# Patient Record
Sex: FEMALE | Race: WHITE | NOT HISPANIC OR LATINO | Employment: PART TIME | URBAN - METROPOLITAN AREA
[De-identification: names, ages, dates, MRNs, and addresses within clinical notes are randomized per-mention and may not be internally consistent; named-entity substitution may affect disease eponyms.]

---

## 2017-01-23 ENCOUNTER — ALLSCRIPTS OFFICE VISIT (OUTPATIENT)
Dept: OTHER | Facility: OTHER | Age: 42
End: 2017-01-23

## 2017-03-17 ENCOUNTER — HOSPITAL ENCOUNTER (EMERGENCY)
Facility: HOSPITAL | Age: 42
Discharge: HOME | End: 2017-03-17
Attending: EMERGENCY MEDICINE | Admitting: EMERGENCY MEDICINE
Payer: COMMERCIAL

## 2017-03-17 VITALS
DIASTOLIC BLOOD PRESSURE: 81 MMHG | OXYGEN SATURATION: 99 % | BODY MASS INDEX: 38.41 KG/M2 | SYSTOLIC BLOOD PRESSURE: 137 MMHG | RESPIRATION RATE: 18 BRPM | WEIGHT: 210 LBS | HEART RATE: 97 BPM | TEMPERATURE: 97.4 F

## 2017-03-17 DIAGNOSIS — T78.40XA ALLERGIC REACTION, INITIAL ENCOUNTER: Primary | ICD-10-CM

## 2017-03-17 PROCEDURE — 96375 TX/PRO/DX INJ NEW DRUG ADDON: CPT

## 2017-03-17 PROCEDURE — 99283 EMERGENCY DEPT VISIT LOW MDM: CPT

## 2017-03-17 PROCEDURE — 96374 THER/PROPH/DIAG INJ IV PUSH: CPT

## 2017-03-17 RX ORDER — DIPHENHYDRAMINE HYDROCHLORIDE 50 MG/ML
INJECTION INTRAMUSCULAR; INTRAVENOUS
Status: COMPLETED
Start: 2017-03-17 | End: 2017-03-17

## 2017-03-17 RX ORDER — EPINEPHRINE 0.3 MG/.3ML
0.3 INJECTION SUBCUTANEOUS ONCE
Qty: 0.3 ML | Refills: 1 | Status: SHIPPED | OUTPATIENT
Start: 2017-03-17 | End: 2018-02-22 | Stop reason: HOSPADM

## 2017-03-17 RX ORDER — DIPHENHYDRAMINE HYDROCHLORIDE 50 MG/ML
50 INJECTION INTRAMUSCULAR; INTRAVENOUS ONCE
Status: COMPLETED | OUTPATIENT
Start: 2017-03-17 | End: 2017-03-17

## 2017-03-17 RX ORDER — METHYLPREDNISOLONE SODIUM SUCCINATE 125 MG/2ML
INJECTION, POWDER, LYOPHILIZED, FOR SOLUTION INTRAMUSCULAR; INTRAVENOUS
Status: COMPLETED
Start: 2017-03-17 | End: 2017-03-17

## 2017-03-17 RX ORDER — METHYLPREDNISOLONE SODIUM SUCCINATE 125 MG/2ML
125 INJECTION, POWDER, LYOPHILIZED, FOR SOLUTION INTRAMUSCULAR; INTRAVENOUS ONCE
Status: COMPLETED | OUTPATIENT
Start: 2017-03-17 | End: 2017-03-17

## 2017-03-17 RX ORDER — POTASSIUM CITRATE 10 MEQ/1
20 TABLET, EXTENDED RELEASE ORAL 2 TIMES DAILY
COMMUNITY
End: 2018-02-20 | Stop reason: DRUGHIGH

## 2017-03-17 RX ORDER — PREDNISONE 20 MG/1
60 TABLET ORAL DAILY
Qty: 15 TABLET | Refills: 0 | Status: SHIPPED | OUTPATIENT
Start: 2017-03-17 | End: 2017-03-22

## 2017-03-17 RX ADMIN — DIPHENHYDRAMINE HYDROCHLORIDE 50 MG: 50 INJECTION, SOLUTION INTRAMUSCULAR; INTRAVENOUS at 05:31

## 2017-03-17 RX ADMIN — METHYLPREDNISOLONE SODIUM SUCCINATE 125 MG: 125 INJECTION, POWDER, FOR SOLUTION INTRAMUSCULAR; INTRAVENOUS at 05:31

## 2017-03-17 RX ADMIN — DIPHENHYDRAMINE HYDROCHLORIDE 50 MG: 50 INJECTION INTRAMUSCULAR; INTRAVENOUS at 05:31

## 2017-03-17 RX ADMIN — METHYLPREDNISOLONE SODIUM SUCCINATE 125 MG: 125 INJECTION, POWDER, LYOPHILIZED, FOR SOLUTION INTRAMUSCULAR; INTRAVENOUS at 05:31

## 2017-04-13 ENCOUNTER — TRANSCRIBE ORDERS (OUTPATIENT)
Dept: LAB | Facility: CLINIC | Age: 42
End: 2017-04-13

## 2017-04-13 ENCOUNTER — APPOINTMENT (OUTPATIENT)
Dept: LAB | Facility: CLINIC | Age: 42
End: 2017-04-13
Payer: COMMERCIAL

## 2017-04-13 DIAGNOSIS — L50.9 URTICARIA, UNSPECIFIED: ICD-10-CM

## 2017-04-13 DIAGNOSIS — T78.3XXA ANGIONEUROTIC EDEMA NOT ELSEWHERE CLASSIFIED: Primary | ICD-10-CM

## 2017-04-13 LAB
C4 SERPL-MCNC: 27 MG/DL (ref 10–40)
ERYTHROCYTE [SEDIMENTATION RATE] IN BLOOD: 16 MM/HOUR (ref 0–20)
TSH SERPL DL<=0.05 MIU/L-ACNC: 0.95 UIU/ML (ref 0.36–3.74)

## 2017-04-13 PROCEDURE — 85652 RBC SED RATE AUTOMATED: CPT

## 2017-04-13 PROCEDURE — 86038 ANTINUCLEAR ANTIBODIES: CPT

## 2017-04-13 PROCEDURE — 84443 ASSAY THYROID STIM HORMONE: CPT

## 2017-04-13 PROCEDURE — 86160 COMPLEMENT ANTIGEN: CPT

## 2017-04-13 PROCEDURE — 86618 LYME DISEASE ANTIBODY: CPT

## 2017-04-13 PROCEDURE — 82785 ASSAY OF IGE: CPT

## 2017-04-13 PROCEDURE — 36415 COLL VENOUS BLD VENIPUNCTURE: CPT

## 2017-04-14 LAB
B BURGDOR IGG SER IA-ACNC: 0.14
B BURGDOR IGM SER IA-ACNC: 0.56
RYE IGE QN: NEGATIVE
TOTAL IGE SMQN RAST: 209 KU/L (ref 0–113)

## 2017-04-25 LAB — MISCELLANEOUS LAB TEST RESULT: NORMAL

## 2017-06-21 ENCOUNTER — ALLSCRIPTS OFFICE VISIT (OUTPATIENT)
Dept: OTHER | Facility: OTHER | Age: 42
End: 2017-06-21

## 2017-06-21 DIAGNOSIS — Z12.31 ENCOUNTER FOR SCREENING MAMMOGRAM FOR MALIGNANT NEOPLASM OF BREAST: ICD-10-CM

## 2017-06-21 DIAGNOSIS — R73.09 OTHER ABNORMAL GLUCOSE: ICD-10-CM

## 2017-06-21 DIAGNOSIS — E78.5 HYPERLIPIDEMIA: ICD-10-CM

## 2017-07-06 ENCOUNTER — APPOINTMENT (OUTPATIENT)
Dept: LAB | Facility: CLINIC | Age: 42
End: 2017-07-06
Payer: COMMERCIAL

## 2017-07-06 DIAGNOSIS — R73.09 OTHER ABNORMAL GLUCOSE: ICD-10-CM

## 2017-07-06 DIAGNOSIS — E78.5 HYPERLIPIDEMIA: ICD-10-CM

## 2017-07-06 LAB
ALBUMIN SERPL BCP-MCNC: 2.9 G/DL (ref 3.5–5)
ALP SERPL-CCNC: 69 U/L (ref 46–116)
ALT SERPL W P-5'-P-CCNC: 15 U/L (ref 12–78)
ANION GAP SERPL CALCULATED.3IONS-SCNC: 5 MMOL/L (ref 4–13)
AST SERPL W P-5'-P-CCNC: 8 U/L (ref 5–45)
BILIRUB SERPL-MCNC: 0.42 MG/DL (ref 0.2–1)
BUN SERPL-MCNC: 14 MG/DL (ref 5–25)
CALCIUM SERPL-MCNC: 8.7 MG/DL (ref 8.3–10.1)
CHLORIDE SERPL-SCNC: 104 MMOL/L (ref 100–108)
CHOLEST SERPL-MCNC: 138 MG/DL (ref 50–200)
CO2 SERPL-SCNC: 27 MMOL/L (ref 21–32)
CREAT SERPL-MCNC: 0.94 MG/DL (ref 0.6–1.3)
EST. AVERAGE GLUCOSE BLD GHB EST-MCNC: 126 MG/DL
GFR SERPL CREATININE-BSD FRML MDRD: >60 ML/MIN/1.73SQ M
GLUCOSE P FAST SERPL-MCNC: 111 MG/DL (ref 65–99)
HBA1C MFR BLD: 6 % (ref 4.2–6.3)
HDLC SERPL-MCNC: 26 MG/DL (ref 40–60)
LDLC SERPL CALC-MCNC: 94 MG/DL (ref 0–100)
POTASSIUM SERPL-SCNC: 3.9 MMOL/L (ref 3.5–5.3)
PROT SERPL-MCNC: 7.6 G/DL (ref 6.4–8.2)
SODIUM SERPL-SCNC: 136 MMOL/L (ref 136–145)
TRIGL SERPL-MCNC: 90 MG/DL

## 2017-07-06 PROCEDURE — 80053 COMPREHEN METABOLIC PANEL: CPT

## 2017-07-06 PROCEDURE — 36415 COLL VENOUS BLD VENIPUNCTURE: CPT

## 2017-07-06 PROCEDURE — 83036 HEMOGLOBIN GLYCOSYLATED A1C: CPT

## 2017-07-06 PROCEDURE — 80061 LIPID PANEL: CPT

## 2017-09-13 ENCOUNTER — HOSPITAL ENCOUNTER (OUTPATIENT)
Dept: MAMMOGRAPHY | Facility: CLINIC | Age: 42
Discharge: HOME | End: 2017-09-13
Payer: COMMERCIAL

## 2017-09-13 DIAGNOSIS — Z12.31 ENCOUNTER FOR SCREENING MAMMOGRAM FOR MALIGNANT NEOPLASM OF BREAST: ICD-10-CM

## 2017-09-13 PROCEDURE — G0202 SCR MAMMO BI INCL CAD: HCPCS

## 2017-09-13 PROCEDURE — 77063 BREAST TOMOSYNTHESIS BI: CPT

## 2017-09-20 ENCOUNTER — ALLSCRIPTS OFFICE VISIT (OUTPATIENT)
Dept: OTHER | Facility: OTHER | Age: 42
End: 2017-09-20

## 2017-09-20 DIAGNOSIS — Z12.4 ENCOUNTER FOR SCREENING FOR MALIGNANT NEOPLASM OF CERVIX: ICD-10-CM

## 2017-09-20 PROCEDURE — G0145 SCR C/V CYTO,THINLAYER,RESCR: HCPCS | Performed by: OBSTETRICS & GYNECOLOGY

## 2017-09-20 PROCEDURE — 87624 HPV HI-RISK TYP POOLED RSLT: CPT | Performed by: OBSTETRICS & GYNECOLOGY

## 2017-09-21 ENCOUNTER — LAB REQUISITION (OUTPATIENT)
Dept: LAB | Facility: HOSPITAL | Age: 42
End: 2017-09-21
Payer: COMMERCIAL

## 2017-09-21 DIAGNOSIS — Z12.4 ENCOUNTER FOR SCREENING FOR MALIGNANT NEOPLASM OF CERVIX: ICD-10-CM

## 2017-09-23 DIAGNOSIS — N20.0 CALCULUS OF KIDNEY: ICD-10-CM

## 2017-09-27 LAB — HPV RRNA GENITAL QL NAA+PROBE: NORMAL

## 2017-09-28 ENCOUNTER — APPOINTMENT (OUTPATIENT)
Dept: PHYSICAL THERAPY | Facility: CLINIC | Age: 42
End: 2017-09-28
Payer: COMMERCIAL

## 2017-09-28 PROCEDURE — 97110 THERAPEUTIC EXERCISES: CPT

## 2017-09-28 PROCEDURE — 97162 PT EVAL MOD COMPLEX 30 MIN: CPT

## 2017-09-28 PROCEDURE — G8990 OTHER PT/OT CURRENT STATUS: HCPCS

## 2017-09-28 PROCEDURE — G8991 OTHER PT/OT GOAL STATUS: HCPCS

## 2017-10-02 LAB
LAB AP GYN PRIMARY INTERPRETATION: NORMAL
LAB AP LMP: NORMAL
Lab: NORMAL

## 2017-10-03 ENCOUNTER — APPOINTMENT (OUTPATIENT)
Dept: PHYSICAL THERAPY | Facility: CLINIC | Age: 42
End: 2017-10-03
Payer: COMMERCIAL

## 2017-10-03 PROCEDURE — 97110 THERAPEUTIC EXERCISES: CPT

## 2017-10-03 PROCEDURE — 97140 MANUAL THERAPY 1/> REGIONS: CPT

## 2017-10-04 ENCOUNTER — GENERIC CONVERSION - ENCOUNTER (OUTPATIENT)
Dept: OTHER | Facility: OTHER | Age: 42
End: 2017-10-04

## 2017-10-05 ENCOUNTER — APPOINTMENT (OUTPATIENT)
Dept: PHYSICAL THERAPY | Facility: CLINIC | Age: 42
End: 2017-10-05
Payer: COMMERCIAL

## 2017-10-05 PROCEDURE — 97110 THERAPEUTIC EXERCISES: CPT

## 2017-10-05 PROCEDURE — 97140 MANUAL THERAPY 1/> REGIONS: CPT

## 2017-10-10 ENCOUNTER — TRANSCRIBE ORDERS (OUTPATIENT)
Dept: RADIOLOGY | Facility: HOSPITAL | Age: 42
End: 2017-10-10

## 2017-10-10 ENCOUNTER — HOSPITAL ENCOUNTER (OUTPATIENT)
Dept: RADIOLOGY | Facility: HOSPITAL | Age: 42
Discharge: HOME | End: 2017-10-10
Attending: UROLOGY
Payer: COMMERCIAL

## 2017-10-10 DIAGNOSIS — N20.0 CALCULUS OF KIDNEY: ICD-10-CM

## 2017-10-10 PROCEDURE — 76770 US EXAM ABDO BACK WALL COMP: CPT

## 2017-10-10 PROCEDURE — 74000 HB X-RAY EXAM OF ABDOMEN (SINGLE ANTEROPOSTERIOR VIEW): CPT

## 2017-10-11 ENCOUNTER — APPOINTMENT (OUTPATIENT)
Dept: PHYSICAL THERAPY | Facility: CLINIC | Age: 42
End: 2017-10-11
Payer: COMMERCIAL

## 2017-10-11 PROCEDURE — 97140 MANUAL THERAPY 1/> REGIONS: CPT

## 2017-10-11 PROCEDURE — 97110 THERAPEUTIC EXERCISES: CPT

## 2017-10-13 ENCOUNTER — APPOINTMENT (OUTPATIENT)
Dept: PHYSICAL THERAPY | Facility: CLINIC | Age: 42
End: 2017-10-13
Payer: COMMERCIAL

## 2017-10-13 PROCEDURE — 97110 THERAPEUTIC EXERCISES: CPT

## 2017-10-13 PROCEDURE — 97140 MANUAL THERAPY 1/> REGIONS: CPT

## 2017-10-16 ENCOUNTER — APPOINTMENT (OUTPATIENT)
Dept: PHYSICAL THERAPY | Facility: CLINIC | Age: 42
End: 2017-10-16
Payer: COMMERCIAL

## 2017-10-17 ENCOUNTER — APPOINTMENT (OUTPATIENT)
Dept: PHYSICAL THERAPY | Facility: CLINIC | Age: 42
End: 2017-10-17
Payer: COMMERCIAL

## 2017-10-17 PROCEDURE — 97110 THERAPEUTIC EXERCISES: CPT

## 2017-10-18 ENCOUNTER — APPOINTMENT (OUTPATIENT)
Dept: PHYSICAL THERAPY | Facility: CLINIC | Age: 42
End: 2017-10-18
Payer: COMMERCIAL

## 2017-10-23 ENCOUNTER — APPOINTMENT (OUTPATIENT)
Dept: PHYSICAL THERAPY | Facility: CLINIC | Age: 42
End: 2017-10-23
Payer: COMMERCIAL

## 2017-10-23 PROCEDURE — 97110 THERAPEUTIC EXERCISES: CPT

## 2017-10-23 PROCEDURE — 97140 MANUAL THERAPY 1/> REGIONS: CPT

## 2017-10-25 ENCOUNTER — APPOINTMENT (OUTPATIENT)
Dept: PHYSICAL THERAPY | Facility: CLINIC | Age: 42
End: 2017-10-25
Payer: COMMERCIAL

## 2017-10-25 PROCEDURE — G8992 OTHER PT/OT  D/C STATUS: HCPCS

## 2017-10-25 PROCEDURE — G8991 OTHER PT/OT GOAL STATUS: HCPCS

## 2017-10-25 PROCEDURE — 97110 THERAPEUTIC EXERCISES: CPT

## 2018-01-05 ENCOUNTER — APPOINTMENT (OUTPATIENT)
Dept: LAB | Facility: CLINIC | Age: 43
End: 2018-01-05
Payer: COMMERCIAL

## 2018-01-05 DIAGNOSIS — N20.0 CALCULUS OF KIDNEY: ICD-10-CM

## 2018-01-05 LAB
ANION GAP SERPL CALCULATED.3IONS-SCNC: 5 MMOL/L (ref 4–13)
BACTERIA UR QL AUTO: ABNORMAL /HPF
BILIRUB UR QL STRIP: NEGATIVE
BUN SERPL-MCNC: 15 MG/DL (ref 5–25)
CALCIUM SERPL-MCNC: 9.2 MG/DL (ref 8.3–10.1)
CHLORIDE SERPL-SCNC: 104 MMOL/L (ref 100–108)
CLARITY UR: ABNORMAL
CO2 SERPL-SCNC: 30 MMOL/L (ref 21–32)
COLOR UR: YELLOW
CREAT SERPL-MCNC: 0.89 MG/DL (ref 0.6–1.3)
GFR SERPL CREATININE-BSD FRML MDRD: 81 ML/MIN/1.73SQ M
GLUCOSE SERPL-MCNC: 95 MG/DL (ref 65–140)
GLUCOSE UR STRIP-MCNC: NEGATIVE MG/DL
HGB UR QL STRIP.AUTO: ABNORMAL
KETONES UR STRIP-MCNC: NEGATIVE MG/DL
LEUKOCYTE ESTERASE UR QL STRIP: ABNORMAL
NITRITE UR QL STRIP: NEGATIVE
NON-SQ EPI CELLS URNS QL MICRO: ABNORMAL /HPF
PH UR STRIP.AUTO: 7.5 [PH] (ref 4.5–8)
POTASSIUM SERPL-SCNC: 3.9 MMOL/L (ref 3.5–5.3)
PROT UR STRIP-MCNC: ABNORMAL MG/DL
RBC #/AREA URNS AUTO: ABNORMAL /HPF
SODIUM SERPL-SCNC: 139 MMOL/L (ref 136–145)
SP GR UR STRIP.AUTO: 1.02 (ref 1–1.03)
UROBILINOGEN UR QL STRIP.AUTO: 0.2 E.U./DL
WBC #/AREA URNS AUTO: ABNORMAL /HPF

## 2018-01-05 PROCEDURE — 81001 URINALYSIS AUTO W/SCOPE: CPT

## 2018-01-05 PROCEDURE — 80048 BASIC METABOLIC PNL TOTAL CA: CPT

## 2018-01-05 PROCEDURE — 36415 COLL VENOUS BLD VENIPUNCTURE: CPT

## 2018-01-09 ENCOUNTER — TRANSCRIBE ORDERS (OUTPATIENT)
Dept: LAB | Facility: CLINIC | Age: 43
End: 2018-01-09

## 2018-01-09 ENCOUNTER — APPOINTMENT (OUTPATIENT)
Dept: LAB | Facility: CLINIC | Age: 43
End: 2018-01-09
Payer: COMMERCIAL

## 2018-01-09 DIAGNOSIS — N20.0 BILATERAL KIDNEY STONES: Primary | ICD-10-CM

## 2018-01-09 PROCEDURE — 83935 ASSAY OF URINE OSMOLALITY: CPT

## 2018-01-09 PROCEDURE — 82436 ASSAY OF URINE CHLORIDE: CPT

## 2018-01-09 PROCEDURE — 84560 ASSAY OF URINE/URIC ACID: CPT

## 2018-01-09 PROCEDURE — 83735 ASSAY OF MAGNESIUM: CPT

## 2018-01-09 PROCEDURE — 82507 ASSAY OF CITRATE: CPT

## 2018-01-09 PROCEDURE — 84105 ASSAY OF URINE PHOSPHORUS: CPT

## 2018-01-09 PROCEDURE — 82131 AMINO ACIDS SINGLE QUANT: CPT

## 2018-01-09 PROCEDURE — 84300 ASSAY OF URINE SODIUM: CPT

## 2018-01-09 PROCEDURE — 82570 ASSAY OF URINE CREATININE: CPT

## 2018-01-09 PROCEDURE — 82340 ASSAY OF CALCIUM IN URINE: CPT

## 2018-01-09 PROCEDURE — 83945 ASSAY OF OXALATE: CPT

## 2018-01-09 PROCEDURE — 84133 ASSAY OF URINE POTASSIUM: CPT

## 2018-01-09 PROCEDURE — 84392 ASSAY OF URINE SULFATE: CPT

## 2018-01-09 PROCEDURE — 82140 ASSAY OF AMMONIA: CPT

## 2018-01-09 PROCEDURE — 81003 URINALYSIS AUTO W/O SCOPE: CPT

## 2018-01-09 NOTE — MISCELLANEOUS
Message   Recorded as Task   Date: 06/27/2016 12:55 PM, Created By: Tramaine   Task Name: Call Back   Assigned To: Tramaine   Regarding Patient: Mel Sifuentes, Status: Active   Comment: Tramaine - 27 Jun 2016 12:55 PM     TASK CREATED  call patient when she returns from vacation on July 5   Joanna Arriaga - 13 Jul 2016 3:49 PM     TASK EDITED  s/w patient  advised A1C is 6% advised to watch diet closer and we can recheck that in 6 months  discussed XR results, severe DDD, advised we should get an MRI due to the decrease activity level and increase in pain patient is having  Patient agreed          Signatures   Electronically signed by : Dianna Muse, [facility]; Jul 13 2016  3:50PM EST                       (Author)

## 2018-01-10 ENCOUNTER — GENERIC CONVERSION - ENCOUNTER (OUTPATIENT)
Dept: OTHER | Facility: OTHER | Age: 43
End: 2018-01-10

## 2018-01-11 NOTE — PROGRESS NOTES
Assessment    1  Never a smoker   2  Chronic lumbar radiculopathy (724 4) (M54 16)   3  Encounter for examination for period of delayed growth in childhood without abnormal   findings (V70 8) (Z00 70)   4  Obesity (278 00) (E66 9)   5  Annual visit for general adult medical examination with abnormal findings (V70 0)   (Z00 01)    Plan  Annual visit for general adult medical examination with abnormal findings    · (1) HEMOGLOBIN A1C; Status:Active; Requested LOQ:[id number];    · (1) LIPID PANEL, FASTING; Status:Active; Requested for:21Jun2016;   Chronic lumbar radiculopathy    · Nabumetone 750 MG Oral Tablet; TAKE 1 TABLET EVERY 12 HOURS DAILY   · * XR SPINE LUMBAR MINIMUM 4 VIEWS; Status:Active; Requested ZXO:[id number]; Health Maintenance    · Follow-up visit in 1 year Evaluation and Treatment  Follow-up  Status: Complete  Done:  21Jun2016  Obesity    · Some eating tips that can help you lose weight ; Status:Complete;   Done: 54EAS2106   · We encourage all of our patients to exercise regularly  30 minutes of exercise or physical  activity five or more days a week is recommended for children and adults ;  Status:Complete;   Done: 48FPP4630    Discussion/Summary  health maintenance visit healthy adult female     1  physical - a1c, lipids will be drawn today, encouraged to try to eat healthy and be more active  2  chronic low back pain - get XR  If abnormal XR will refer for MRI, if normal will refer for PT with Maya Christopher  Can use relafen 750 mg 1 pill twice daily for back pain as needed    3  obesity - encouraged to watch diet and try to be more active, need to get #2 addressed    PAP - schedule with Dr Daniella Mcdonald    f/u 1 year or sooner if needed  Chief Complaint  pt here for wellness pe     pap due last done 2013      History of Present Illness  HM, Adult Female:   General Health: The patient's health since the last visit is described as good  She has regular dental visits  She denies vision problems  Vision care includes wearing glasses, having eye examinations 2 times per year and an eye examination more than a year ago  She denies hearing loss  Lifestyle:  She does not have a healthy diet  She does not exercise regularly  She does not use tobacco  She denies alcohol use  She denies drug use  Reproductive health:  LMP - now, last PAP 2013 Dr Marivel Olson  Screening:   HPI: Patient here physical  Has made no change in diet or exercise from last physical due to low back pain  R lower back constant daily  More sedentary because of pain  Can't sit on floor because cannot get up  Constant pain, with change in position stiff and sharp, numbness in toes off and on usually when it gets really bad  Every other week "really bad", lasts one day then improves  Take Aleve or Motrin with some relief, tries to take as little as possible  If she has to work will always have to take 2 aleve twice a day  Denies trauma  Has been happening and getting worse for years now  Review of Systems    Constitutional: No fever, no chills, feels well, no tiredness, no recent weight gain or weight loss  Eyes: No complaints of eye pain, no red eyes, no eyesight problems, no discharge, no dry eyes, no itching of eyes  ENT: no complaints of earache, no loss of hearing, no nose bleeds, no nasal discharge, no sore throat, no hoarseness  Cardiovascular: No complaints of slow heart rate, no fast heart rate, no chest pain, no palpitations, no leg claudication, no lower extremity edema  Respiratory: No complaints of shortness of breath, no wheezing, no cough, no SOB on exertion, no orthopnea, no PND  Gastrointestinal: No complaints of abdominal pain, no constipation, no nausea or vomiting, no diarrhea, no bloody stools  Genitourinary: No complaints of dysuria, no incontinence, no pelvic pain, no dysmenorrhea, no vaginal discharge or bleeding     Musculoskeletal: No complaints of arthralgias, no myalgias, no joint swelling or stiffness, no limb pain or swelling  Integumentary: No complaints of skin rash or lesions, no itching, no skin wounds, no breast pain or lump  Neurological: No complaints of headache, no confusion, no convulsions, no numbness, no dizziness or fainting, no tingling, no limb weakness, no difficulty walking  Psychiatric: Not suicidal, no sleep disturbance, no anxiety or depression, no change in personality, no emotional problems  Endocrine: No complaints of proptosis, no hot flashes, no muscle weakness, no deepening of the voice, no feelings of weakness  Hematologic/Lymphatic: No complaints of swollen glands, no swollen glands in the neck, does not bleed easily, does not bruise easily  Active Problems    1  Allergic rhinitis (477 9) (J30 9)   2  Elderly multigravida with antepartum condition or complication (005 24) (H36 400)   3  Gestational Diabetes Mellitus - Antepartum Condition Or Prior Complicated Delivery   (980 22)   4   Obesity (278 00) (E66 9)    Past Medical History    · History of Obesity complicating pregnancy, childbirth, or puerperium, antepartum  (649 13,278 00) (O99 210,E66 9)   · History of Uterine scar from previous  delivery, antepartum condition or  complication (630 21) (H16 02)    Surgical History    · History of  Section   · History of Oral Surgery Tooth Extraction   · History of Tonsillectomy   · History of Tubal Ligation    Family History  Mother    · Family history of diabetes mellitus (V18 0) (Z83 3)  Father    · Family history of Coronary artery disease   · Family history of hyperlipidemia (V18 19) (Z83 49)   · Family history of hypertension (V17 49) (Z82 49)   · Family history of malignant neoplasm of urinary bladder (V16 52) (Z80 52)    Social History    · Denied: History of Alcohol use   · Always uses seat belt   · Caffeine use (V49 89) (F15 90)   · 1 cup tea daily   · Denied: History of Drug use   · Has smoke detectors   · Never a smoker    Current Meds 1  Fluticasone Propionate 50 MCG/ACT Nasal Suspension; INSTILL 1 SQUIRT Daily; Therapy: (Recorded:10Jun2015) to Recorded   2  Ibuprofen 200 MG Oral Tablet; TAKE 1 TABLET EVERY 4 TO 6 HOURS AS NEEDED; Therapy: ([place] [facility]) to Recorded   3  PriLOSEC 20 MG Oral Capsule Delayed Release; TAKE 1 CAPSULE Daily PRN; Therapy: ([place] [facility]) to Recorded    Allergies    1  No Known Drug Allergies    2  Seasonal    Vitals   Recorded: 21Jun2016 10:49AM Recorded: 21Jun2016 09:46AM   Heart Rate  68   Respiration  16   Systolic 988 132, LUE, Sitting   Diastolic 84 80, LUE, Sitting   Height  5 ft 2 in   Weight  207 lb 13 oz   BMI Calculated  38 01   BSA Calculated  1 94     Physical Exam    Constitutional   General appearance: No acute distress, well appearing and well nourished  Eyes   Conjunctiva and lids: No swelling, erythema or discharge  Pupils and irises: Equal, round, reactive to light  Ears, Nose, Mouth, and Throat   External inspection of ears and nose: Normal     Otoscopic examination: Tympanic membranes translucent with normal light reflex  Canals patent without erythema  Hearing: Normal     Nasal mucosa, septum, and turbinates: Normal without edema or erythema  Lips, teeth, and gums: Normal, good dentition  Oropharynx: Normal with no erythema, edema, exudate or lesions  Neck   Neck: Supple, symmetric, trachea midline, no masses  Thyroid: Normal, no thyromegaly  Pulmonary   Respiratory effort: No increased work of breathing or signs of respiratory distress  Palpation of chest: Normal     Auscultation of lungs: Clear to auscultation  Cardiovascular   Palpation of heart: Normal PMI, no thrills  Auscultation of heart: Normal rate and rhythm, normal S1 and S2, no murmurs  Pedal pulses: 2+ bilaterally  Examination of extremities for edema and/or varicosities: Normal     Abdomen   Abdomen: Non-tender, no masses  Liver and spleen: No hepatomegaly or splenomegaly  Lymphatic   Palpation of lymph nodes in neck: No lymphadenopathy  Musculoskeletal   Gait and station: Normal     Digits and nails: Normal without clubbing or cyanosis  Joints, bones, and muscles: Normal     Range of motion: Normal     Stability: Normal     Muscle strength/tone: Normal     Skin   Skin and subcutaneous tissue: Normal without rashes or lesions  Palpation of skin and subcutaneous tissue: Normal turgor  Neurologic   Cranial nerves: Cranial nerves II-XII intact  Reflexes: 2+ and symmetric      Psychiatric   Judgment and insight: Normal     Mood and affect: Normal        Signatures   Electronically signed by : Néstor Vera, [facility]; Jun 21 2016 10:52AM EST                       (Author)    Electronically signed by : BISMARK Man ; Jun 21 2016 11:28AM EST                       (Author)

## 2018-01-13 VITALS
BODY MASS INDEX: 38.64 KG/M2 | SYSTOLIC BLOOD PRESSURE: 120 MMHG | DIASTOLIC BLOOD PRESSURE: 70 MMHG | WEIGHT: 210 LBS | HEIGHT: 62 IN

## 2018-01-13 VITALS
BODY MASS INDEX: 38.83 KG/M2 | HEIGHT: 62 IN | HEART RATE: 70 BPM | DIASTOLIC BLOOD PRESSURE: 72 MMHG | SYSTOLIC BLOOD PRESSURE: 120 MMHG | WEIGHT: 211 LBS

## 2018-01-15 LAB
AMMONIA 24H UR-MRATE: 16 MEQ/24 HR
AMMONIA UR-SCNC: ABNORMAL UG/DL
CA H2 PHOS DIHYD CRY URNS QL MICRO: 1.59 RATIO (ref 0–3)
CALCIUM 24H UR-MCNC: 8.9 MG/DL
CALCIUM 24H UR-MRATE: 138 MG/24 HR (ref 100–300)
CHLORIDE 24H UR-SCNC: 61 MMOL/L
CHLORIDE 24H UR-SRATE: 95 MMOL/24 HR (ref 110–250)
CITRATE 24H UR-MCNC: 297 MG/L
CITRATE 24H UR-MRATE: 460 MG/24 HR (ref 320–1240)
COM CRY STONE QL IR: 2.14 RATIO (ref 0–6)
CREAT 24H UR-MCNC: 44.7 MG/DL
CREAT 24H UR-MRATE: 692.9 MG/24 HR (ref 800–1800)
CYSTINE 24H UR-MCNC: 3.33 MG/L
CYSTINE 24H UR-MRATE: 5.16 MG/24 HR (ref 10–100)
MAGNESIUM 24H UR-MRATE: 64 MG/24 HR (ref 12–293)
MAGNESIUM UR-MCNC: 4.1 MG/DL
NA URATE CRY STONE QL IR: 4.35 RATIO (ref 0–4)
OSMOLALITY UR: 350 MOSMOL/KG (ref 300–900)
OXALATE 24H UR-MRATE: 12 MG/24 HR (ref 4–31)
OXALATE UR-MCNC: 8 MG/L
PH 24H UR: 6.9 [PH]
PHOSPHATE 24H UR-MCNC: 24.2 MG/DL
PHOSPHATE 24H UR-MRATE: 375.1 MG/24 HR (ref 400–1300)
PLEASE NOTE (STONE RISK): ABNORMAL
POTASSIUM 24H UR-SCNC: 37.5 MMOL/24 HR (ref 25–125)
POTASSIUM UR-SCNC: 24.2 MMOL/L
PRESERVED URINE: 1550 ML/24 HR (ref 600–1600)
SODIUM 24H UR-SCNC: 82 MMOL/L
SODIUM 24H UR-SRATE: 127 MMOL/24 HR (ref 39–258)
SPECIMEN VOL 24H UR: 1550 ML/24 HR (ref 600–1600)
SULFATE 24H UR-MCNC: 19 MEQ/24 HR (ref 0–30)
SULFATE UR-MCNC: 12 MEQ/L
TRI-PHOS CRY STONE MICRO: 0.08 RATIO (ref 0–1)
URATE 24H UR-MCNC: 43.8 MG/DL
URATE 24H UR-MRATE: 679 MG/24 HR (ref 250–750)
URATE DIHYD CRY STONE QL IR: 0.27 RATIO (ref 0–1.2)

## 2018-01-15 NOTE — PROGRESS NOTES
Assessment    1  Never a smoker   2  Encounter for preventive health examination (V70 0) (Z00 00)   3  Severe obesity (BMI 35 0-39 9) (278 01) (E66 01)    Plan  Dyslipidemia    · (1) COMPREHENSIVE METABOLIC PANEL; Status:Active; Requested PRD:[id number];    · (1) LIPID PANEL FASTING W DIRECT LDL REFLEX; Status:Active; Requested  TYH:[id number];   Elevated glucose level    · (1) HEMOGLOBIN A1C; Status:Active; Requested CCZ:[id number];   Encounter for screening mammogram for malignant neoplasm of breast    · * MAMMO SCREENING BILATERAL W CAD; Status:Active; Requested SJO:[id number];   Sacroiliitis    · Omeprazole 20 MG Oral Capsule Delayed Release  Severe obesity (BMI 35 0-39  9)    · *1 - [facility]  *  Status: Active  Requested  for: 21Jun2017  Care Summary provided  : Yes   · A diet low in sugar may help your condition ; Status:Complete;   Done: 66ZSK2857   · Begin or continue regular aerobic exercise  Gradually work up to at least 5 sessions of 30  minutes of exercise a week ; Status:Complete;   Done: 88TYE2011   · Diets that are low in carbohydrates and high in protein are very popular for weight loss ;  Status:Complete;   Done: 26TSR5735   · Eat a low fat and low cholesterol diet ; Status:Complete;   Done: 96BTH8857   · We recommend you modify your diet to achieve and maintain a healthy weight  Being  overweight may increase your risk for developing health problems such as diabetes,  heart disease, and cancer  Avoid high fat foods and eat a balanced diet rich  in fruits and vegetables  The combination of a reduced-calorie diet and increased  physical activity is recommended    Please let us know if you would like to  learn more about your nutrition and calorie needs, and additional options including  weight loss programs that can help you achieve your goals ; Status:Complete;   Done:  36CDX6487    Discussion/Summary  health maintenance visit Currently, she eats a poor diet and has an inadequate exercise regimen  the risks and benefits of cervical cancer screening were discussed cervical cancer screening is managed by Gyn Dr Marivel Olson Breast cancer screening: the risks and benefits of breast cancer screening were discussed and mammogram has been ordered  Colorectal cancer screening: colorectal cancer screening is not indicated  The immunizations are up to date  Advice and education were given regarding nutrition, aerobic exercise, sunscreen use and self skin examination  Patient discussion: discussed with the patient  Possible side effects of new medications were reviewed with the patient/guardian today  The treatment plan was reviewed with the patient/guardian  The patient/guardian understands and agrees with the treatment plan      Chief Complaint  Pt presents in the office today for a PE;    Mammo due and order was printed  Pap due and will schedule      History of Present Illness  HM, Adult Female: The patient is being seen for a health maintenance evaluation  The last health maintenance visit was 1 year(s) ago  General Health: She has regular dental visits  Immunizations status: up to date  Lifestyle:  She does not have a healthy diet  She does not exercise regularly  She does not use tobacco  She denies alcohol use  She denies drug use  Reproductive health: the patient is premenopausal   she reports normal menses  Screening:      Review of Systems    Constitutional: no fever, not feeling poorly, no recent weight gain, no chills, not feeling tired and no recent weight loss  Eyes: no eye pain, eyes not red, no purulent discharge from the eyes and no itching of the eyes  ENT: no complaints of earache, no loss of hearing, no nose bleeds, no nasal discharge, no sore throat, no hoarseness  Cardiovascular: No complaints of slow heart rate, no fast heart rate, no chest pain, no palpitations, no leg claudication, no lower extremity edema     Respiratory: No complaints of shortness of breath, no wheezing, no cough, no SOB on exertion, no orthopnea, no PND  Gastrointestinal: No complaints of abdominal pain, no constipation, no nausea or vomiting, no diarrhea, no bloody stools  Genitourinary: no dysuria, no pelvic pain, no vaginal discharge and no unexplained vaginal bleeding  Musculoskeletal: No complaints of arthralgias, no myalgias, no joint swelling or stiffness, no limb pain or swelling  Integumentary: no rashes, no breast pain, no skin lesions and no breast lump  Neurological: no headache, no numbness, no tingling, no dizziness, no limb weakness, no fainting and no difficulty walking  Psychiatric: no anxiety, no sleep disturbances and no depression  Hematologic/Lymphatic: no swollen glands  Active Problems    1  Allergic rhinitis (477 9) (J30 9)   2  Backache (724 5) (M54 9)   3  Chronic lumbar radiculopathy (724 4) (M54 16)   4  Elderly multigravida with antepartum condition or complication (624 54) (I42 878)   5  Encounter for examination for period of delayed growth in childhood without abnormal   findings (V70 8) (Z00 70)   6  GERD (gastroesophageal reflux disease) (530 81) (K21 9)   7  Gestational Diabetes Mellitus - Antepartum Condition Or Prior Complicated Delivery   (382 78)   8  Hydronephrosis (591) (N13 30)   9  Lumbar degenerative disc disease (722 52) (M51 36)   10  Nephrolithiasis (592 0) (N20 0)   11  Sacroiliitis (720 2) (M46 1)   12   Spondylolisthesis of lumbar region (738 4) (M43 16)    Past Medical History    · History of seasonal allergies (V15 09) (Z88 9)   · History of Obesity complicating pregnancy, childbirth, or puerperium, antepartum  (649 13,278 00) (O99 210,E66 9)   · History of Uterine scar from previous  delivery, antepartum condition or  complication (129 62) (C64 87)   · History of Wears eyeglasses (V49 89) (Z97 3)    Surgical History    · History of  Section   · History of Dilation And Curettage   · History of Oral Surgery Tooth Extraction   · History of Tonsillectomy   · History of Tubal Ligation    Family History  Mother    · Family history of diabetes mellitus (V18 0) (Z83 3)  Father    · Family history of Coronary artery disease   · Family history of hyperlipidemia (V18 19) (Z83 49)   · Family history of hypertension (V17 49) (Z82 49)   · Family history of kidney stones (V18 69) (Z84 1)   · Family history of malignant neoplasm (V16 9) (Z80 9)   · Family history of malignant neoplasm of urinary bladder (V16 52) (Z80 52)   · Family history of myocardial infarction (V17 3) (Z82 49)  Sister    · Family history of kidney stones (V18 69) (Z84 1)   · Family history of multiple sclerosis (V17 2) (Z82 0)  Maternal [de-identified] Sister    · Family history of multiple sclerosis (V17 2) (Z82 0)  Maternal Grandmother    · FH: COPD (chronic obstructive pulmonary disease) (V17 6) (Z82 5)  Paternal Grandmother    · Family history of liver cancer (V16 0) (Z80 0)   · Family history of pancreatic cancer (V16 0) (Z80 0)  Paternal Grandfather    · Family history of malignant neoplasm of stomach (V16 0) (Z80 0)    Social History    · Denied: History of Alcohol use   · Always uses seat belt   · Caffeine use (V49 89) (F15 90)   · 1 cup tea daily   · Completed college   · Currently sexually active   · Denied: History of Drug use   · Functioning activity level   · participates in sedentary/light activities both inside and outside of the home   · Has smoke detectors   · Living situation   · lives with  and children   ·    · Never a smoker   · 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   · Two children    Current Meds   1  Aleve TABS; 1-2 daily prn; Therapy: (Recorded:16Gcy7549) to Recorded   2  Fluticasone Propionate 50 MCG/ACT Nasal Suspension; INSTILL 1 SQUIRT Daily; Therapy: (Recorded:10Jun2015) to Recorded   3  Omeprazole 20 MG Oral Capsule Delayed Release; TAKE 1 CAPSULE DAILY EVERY   MORNING BEFORE BREAKFAST;    Therapy: 64SRX7071 to (Evaluate:56Wmb4246)  Requested for: 45Ctj3407; Last   Rx:35Zlu8263 Ordered   4  Potassium Citrate ER 10 MEQ (1080 MG) Oral Tablet Extended Release; TAKE 2   TABLETS TWICE DAILY; Therapy: 13IVH6699 to (Evaluate:30Kkd2234)  Requested for: 29YWR0800; Last   Rx:87Gyn8286 Ordered   5  PriLOSEC 20 MG CPDR; TAKE 1 CAPSULE Daily PRN; Therapy: ([facility]) to Recorded    Allergies    1  No Known Drug Allergies    2  Seasonal    Vitals   Recorded: 21Jun2017 02:17PM   Heart Rate 76   Respiration 16   Systolic 757   Diastolic 84   Height 5 ft 2 in   Weight 212 lb 6 4 oz   BMI Calculated 38 85   BSA Calculated 1 96     Physical Exam    Constitutional   General appearance: No acute distress, well appearing and well nourished  Eyes   Conjunctiva and lids: No swelling, erythema or discharge  PERRL, EOMI  Ears, Nose, Mouth, and Throat   External inspection of ears and nose: Normal     Otoscopic examination: Tympanic membranes translucent with normal light reflex  Canals patent without erythema  Lips, teeth, and gums: Normal, good dentition  Oropharynx: Normal with no erythema, edema, exudate or lesions  Neck   Neck: Supple, symmetric, trachea midline, no masses  Thyroid: Normal, no thyromegaly  Pulmonary   Respiratory effort: No increased work of breathing or signs of respiratory distress  Auscultation of lungs: Clear to auscultation  Cardiovascular   Auscultation of heart: Normal rate and rhythm, normal S1 and S2, no murmurs  Examination of extremities for edema and/or varicosities: Normal     Abdomen   Abdomen: Non-tender, no masses  Liver and spleen: No hepatomegaly or splenomegaly  Lymphatic   Palpation of lymph nodes in neck: No lymphadenopathy  Neurologic   Cranial nerves: Cranial nerves II-XII intact  Reflexes: 2+ and symmetric  Psychiatric   Orientation to person, place, and time: Normal     Recent and remote memory: Intact      Mood and affect: Normal        Results/Data  (1) TSH 13Apr2017 10:57AM EPIC, Provider   Test ordered by: Ulysses Picking     Test Name Result Flag Reference   TSH 0 948 uIU/mL  0 358-3 740   This bloodwork is non-fasting  Please drink two glasses of water morning of  bloodwork  This bloodwork is non-fasting  Please drink two glasses of water morning ofbloodwork  Patients undergoing fluorescein dye angiography may retain small amounts of fluorescein in the body for 48-72 hours post procedure  Samples containing fluorescein can produce falsely depressed TSH values  If the patient had this procedure,a specimen should be resubmitted post fluorescein clearance  The recommended reference ranges for TSH during pregnancy are as follows:  First trimester 0 1 to 2 5 uIU/mL  Second trimester  0 2 to 3 0 uIU/mL  Third trimester 0 3 to 3 0 uIU/m     (1) BASIC METABOLIC PROFILE 84WIQ6990 08:41AM Shan Plum   TW Order Number: [id number]  TW Order Number: [id number]     Test Name Result Flag Reference   GLUCOSE,RANDM 100 mg/dL     If the patient is fasting, the ADA then defines impaired fasting glucose as > 100 mg/dL and diabetes as > or equal to 123 mg/dL  SODIUM 139 mmol/L  136-145   POTASSIUM 3 8 mmol/L  3 5-5 3   CHLORIDE 104 mmol/L  100-108   CARBON DIOXIDE 27 mmol/L  21-32   ANION GAP (CALC) 8 mmol/L  4-13   BLOOD UREA NITROGEN 15 mg/dL  5-25   CREATININE 0 86 mg/dL  0 60-1 30   Standardized to IDMS reference method   CALCIUM 8 5 mg/dL  8 3-10 1   eGFR Non-African American      >60 0 ml/min/1 73sq [facility] [employer] Disease Education Program recommendations are as follows:  GFR calculation is accurate only with a steady state creatinine  Chronic Kidney disease less than 60 ml/min/1 73 sq  meters  Kidney failure less than 15 ml/min/1 73 sq  meters  Future Appointments    Date/Time Provider Specialty Site   10/17/2017 09:30 AM BISMARK Spence   Urology [facility] [address]     Signatures   Electronically signed by : Christopher Mclaughlin Sibyl Kanner, M D ; Jun 21 2017  6:27PM EST                       (Author)

## 2018-01-16 NOTE — RESULT NOTES
Verified Results  (1) STONE RISK PROFILE, 24 HOUR URINE 93Yqe4278 11:19AM Shelia Section    Order Number: [id number]  TW Order Number: [id number]     Test Name Result Flag Reference   AMMONIA, URINE 77213 ug/dL  Not Estab  Rosalia Groom  URINE 4 50 mg/L  Not Estab  MAGNESIUM 24 HOUR URINE 68 mg/24 hr  12 - 293   CITRATE URINE 457 mg/L  Not Estab  CALCIUM,UR 11 7 mg/dL  Not Estab  CHLORIDE,UR 60 mmol/L  Not Estab  CREATININE, UR 79 6 mg/dL  Not Estab  AMMONIA, 24 UR 32 mEq/24 hr  Not Estab  CALCIUM, 24UR 175 5 mg/24 hr  100 0 - 300 0   CHLORIDE, 24UR 90 mmol/24 hr L 110 - 250   CITRATE 24 HOUR URINE 686 mg/24 hr  320 - 1240   MAGNESIUM, UR 4 5 mg/dL  Not Estab  OSMOLALITY,  mOsmol/kg  300 - 900   OXALATE URINE (MG/SPEC) 16 mg/L  Not Estab  OXALATE, 24UR 24 mg/24 hr  4 - 31   PH, 24 HR URINE 6 1     PHOSPHORUS, UR 77 8 mg/dL  Not Estab  PHOSPHORUS, 24HR UR 1167 0 mg/24 hr  400 0 - 1300 0   POTASSIUM, UR 39 1 mmol/L  Not Estab  POTASSIUM, 24HR UR 58 6 mmol/24 hr  25 0 - 125 0   SODIUM, UR 84 mmol/L  Not Estab  SODIUM, 24HR  mmol/24 hr  40 - 220   SULFATE, UR, MEQ/L 22 mEq/L  Not Estab  SULFATE 24 HOUR URINE 33 mEq/24 hr H 0 - 30   TOTAL VOLUME (STONE RISK) 1500 mL/24 hr  600 - 1600   Total Volume: 1500 mL   URIC ACID, UR 47 2 mg/dL  Not Estab  URIC ACID, 24HR  mg/24 hr  250 - 750   BRUSHITE 1 76 ratio  0 00 - 3 00   CALCIUM OXALATE 4 26 ratio  0 00 - 6 00   MONOSODIUM URATE 4 16 ratio H 0 00 - 4 00   STRUVITE 0 02 ratio  0 00 - 1 00   URIC ACID (RATIO) 1 59 ratio H 0 00 - 1 20   PLEASE NOTE (STONE RISK) Comment     Graphic analysis of results will follow via computer, mail, or   delivery      Performed at:  [address]  [id number]  : Tammie Soto MD, Phone:  [phone]  Performed at:  [address]  [id number]  : Ladi Villar MD, Phone:  [phone]   CREATININE,UR 24HR 1194 0 mg/24 hr  800 0 - 1800 0   PRESERVED URINE 1500 mL/24 hr  600 - 1600   CYSTINE, QUANT, UR, 24HR 6 75 mg/24 hr L 10 00 - 100 00

## 2018-01-17 ENCOUNTER — GENERIC CONVERSION - ENCOUNTER (OUTPATIENT)
Dept: OTHER | Facility: OTHER | Age: 43
End: 2018-01-17

## 2018-01-18 ENCOUNTER — ALLSCRIPTS OFFICE VISIT (OUTPATIENT)
Dept: OTHER | Facility: OTHER | Age: 43
End: 2018-01-18

## 2018-01-18 DIAGNOSIS — N20.0 CALCULUS OF KIDNEY: ICD-10-CM

## 2018-01-19 NOTE — PROGRESS NOTES
Assessment   1  Calculus of kidney (592 0) (N20 0)   2  Nephrolithiasis (592 0) (N20 0)   3  Backache (724 5) (M54 9)    Plan   Calculus of kidney, PMH: History of hydronephrosis    · Potassium Citrate ER 10 MEQ (1080 MG) Oral Tablet Extended Release   Rx By: Eve Cardona; Dispense: 90 Days ; #:360 Tablet Extended Release; Refill: 3;For: Calculus of kidney, PMH: History of hydronephrosis; YING = N; Sent To: [facility]  Nephrolithiasis    · Potassium Citrate ER 15 MEQ (1620 MG) Oral Tablet Extended Release; take 1    tablet twice a day   Rx By: Eve Cardona; Dispense: 90 Days ; #:90 Tablet Extended Release; Refill: 3;For: Nephrolithiasis; YING = N; Verified Transmission to [address]; Last Updated By: SystemPyreg; 1/18/2018 10:49:40 AM   · (1) CBC/ PLT (NO DIFF); Status:Active; Requested for:01Feb2019;    Perform:[facility]; Due:01Feb2020;Ordered;For:Nephrolithiasis; Ordered By:MelendezLucien marin;   · (1) COMPREHENSIVE METABOLIC PANEL; Status:Active; Requested for:01Feb2019;    Perform:[facility]; Due:01Feb2020;Ordered;For:Nephrolithiasis; Ordered By:MelendezLucien marin;   · (1) MAGNESIUM; Status:Active; Requested for:01Feb2019;    Perform:[facility]; Due:30Omz6105;Ordered;For:Nephrolithiasis; Ordered By:MelendezLucien marin;   · (1) PHOSPHORUS; Status:Active; Requested for:01Feb2019;    Perform:[facility]; Due:38Cqz6136;Ordered;For:Nephrolithiasis; Ordered By:MelendezLucien marin;   · (1) STONE RISK PROFILE, 24 HOUR URINE; Status:Active; Requested for:01Feb2019;    Perform:[facility]; Due:69Fpz5634;Ordered;For:Nephrolithiasis; Ordered By:MelendezLucien marin;   · (1) URIC ACID; Status:Active; Requested for:01Feb2019;    Perform:[facility]; Due:01Feb2020;Ordered;For:Nephrolithiasis; Ordered By:Lucien Melendez;   · (1) URINALYSIS WITH MICROSCOPIC; Status:Active;  Requested for:01Feb2019;    Perform:[facility]; Due:72Lzf3519;Ordered;For:Nephrolithiasis; Ordered By:Lucien Melendez;   · US RETROPERITONEAL COMPLETE; Status:Hold For - Scheduling; Requested    TOQ:70RMZ3988; Perform:[facility]; NLL:99PTH0791;VWXVWTE;DQX:JTSTTQVCCZVJAZB; Ordered By:Lucien Melendez;   · Do not take anti-inflammatory medicines other than aspirin ; Status:Complete;   Done:    65PEB9340   Ordered;For:Nephrolithiasis; Ordered By:Lucien Melendez;   · Drink plenty of fluids ; Status:Complete;   Done: 72BKW0096   Ordered;For:Nephrolithiasis; Ordered By:Lucien Melendez;   · Monitor your urine output ; Status:Complete;   Done: 58EUA1759   Ordered;For:Nephrolithiasis; Ordered By:Lucien Melendez;   · Restrict your sodium (salt) intake to 2 grams per day ; Status:Complete;   Done:    68UNP5846   Ordered;For:Nephrolithiasis; Ordered By:Lucien Melendez;   · We recommend a low-protein diet  Eat no more than 2 servings of protein a day ;    Status:Complete;   Done: 59TMZ5958   Ordered;For:Nephrolithiasis; Ordered By:Lucien Melendez;   · Follow-up visit in 1 year Evaluation and Treatment  Follow-up  Status: Hold For -    Scheduling  Requested for: 10NJH6956   Ordered; For: Nephrolithiasis; Ordered By: Asia Carlos Performed:  Due: 59SDS1100    Discussion/Summary      She is here for follow-up of nephrolithiasis   continues to have a mild back pain her ultrasound from October shows that her nephrolithiasis stone burden is still pretty significant her hydronephrosis has actually improving   change her potassium citrate to long-acting and do 15 milliequivalents twice a day, urgent compliance regarding this  Discussed a low sodium, low sugar, low phosphorus high fluid intake diet  Pamphlets were given to her regarding this  Will repeat a renal ultrasound now to ensure stability in follow-up thereafter     [facility]  Possible side effects of new medications were reviewed with the patient/guardian today   The treatment plan was reviewed with the patient/guardian  The patient/guardian understands and agrees with the treatment plan      Reason For Visit   Follow-up consultation for nephrolithiasis      History of Present Illness   This is a 29-year-old female with a past medical history of nephrolithiasis presents for valuation and medical management   has uric acid and calcium oxalate based kidney stone  Over the last year she has not been hospitalized for this she does have some renal colic from time to time she states she has passed some stones as well and October she had a renal ultrasound which showed that she had mild hydronephrosis that was decreased from her last CT scan   has not been compliant with her potassium citrate does take 20 milliequivalents in the morning however misses her evening dose   lab work was reviewed with her electrolytes remain stable her 24 hour urine showed a lower urinary citrate, she also states she has not been following much of a diet lately it either      Review of Systems        Constitutional: no fever-- and-- no chills  Integumentary: no rashes  Gastrointestinal: no nausea,-- no diarrhea-- and-- no vomiting  Respiratory: no shortness of breath  Cardiovascular: no chest pain-- and-- no lower extremity edema  Musculoskeletal: no joint pain-- and-- no joint swelling  Neurological: no headache,-- no lightheadedness-- and-- no dizziness  Genitourinary: no dysuria  12 point review of systems was negative besides what was mentioned above       ROS reviewed  Past Medical History      The active problems and past medical history were reviewed and updated today  Surgical History      The surgical history was reviewed and updated today  Family History      The family history was reviewed and updated today  Social History   The social history was reviewed and updated today  The social history was reviewed and is unchanged  Current Meds    1   Fluticasone Propionate 50 MCG/ACT Nasal Suspension; INSTILL 1 SQUIRT Daily; Therapy: (Recorded:84Spt8674) to Recorded   2  Potassium Citrate ER 10 MEQ (1080 MG) Oral Tablet Extended Release; TAKE 2     TABLETS TWICE DAILY; Therapy: 89KMH0278 to (Evaluate:82Qii2940)  Requested for: 85PVD1726; Last     Rx:22Rih8981 Ordered     The medication list was reviewed and updated today  The medication list was reviewed and updated today  Allergies   1  No Known Drug Allergies  2  Seasonal    Vitals   Vital Signs    Recorded: 59ZDV8900 10:06AM   Heart Rate 80   Systolic 893   Diastolic 68     Physical Exam        Constitutional: General appearance: No acute distress, well appearing and well nourished  ENT: External ears and nose appear normal          Eyes: Anicteric sclerae  Neck: No bruit heard over either carotid  JVD:  No JVD present  Pulmonary: Respiratory effort: No increased work of breathing or signs of respiratory distress  -- Auscultation of lungs: Clear to auscultation  Cardiovascular: Auscultation of heart: Normal rate and rhythm, normal S1 and S2, without murmurs  Abdomen: Non-tender, no masses  Extremities: No cyanosis, clubbing or edema  Pulses: Dorsalis Pedis and Posterior Tibial pulses normal       Rash: No rash present  Neurologic: Non Focal          Psychiatric: Orientation to person, place, and time: Normal  -- and-- Mood and affect: Normal        Back: No CVA tenderness  Results/Data   (1) STONE RISK PROFILE, 24 HOUR URINE 30ZHU8635 08:07AM Yelena Aljosiah      Test Name Result Flag Reference   AMMONIA, URINE 90001 ug/dL  Not Estab  Pat Kelechi  URINE 3 33 mg/L  Not Estab  MAGNESIUM 24 HOUR URINE 64 mg/24 hr  12 - 293   CITRATE URINE 297 mg/L  Not Estab  CALCIUM,UR 8 9 mg/dL  Not Estab  CHLORIDE,UR 61 mmol/L  Not Estab  CREATININE, UR 44 7 mg/dL  Not Estab  AMMONIA, 24 UR 16 mEq/24 hr  Not Estab     CALCIUM, 24UR 138 0 mg/24 hr  100 0 - 300 0 CHLORIDE, 24UR 95 mmol/24 hr L 110 - 250   CITRATE 24 HOUR URINE 460 mg/24 hr  320 - 1240   MAGNESIUM, UR 4 1 mg/dL  Not Estab  OSMOLALITY,  mOsmol/kg  300 - 900   OXALATE URINE (MG/SPEC) 8 mg/L  Not Estab  OXALATE, 24UR 12 mg/24 hr  4 - 31   PH, 24 HR URINE 6 9     PHOSPHORUS, UR 24 2 mg/dL  Not Estab  PHOSPHORUS, 24HR  1 mg/24 hr L 400 0 - 1300 0   POTASSIUM, UR 24 2 mmol/L  Not Estab  POTASSIUM, 24HR UR 37 5 mmol/24 hr  25 0 - 125 0   SODIUM, UR 82 mmol/L  Not Estab  SODIUM, 24HR  mmol/24 hr  39 - 258   SULFATE, UR, MEQ/L 12 mEq/L  Not Estab  SULFATE 24 HOUR URINE 19 mEq/24 hr  0 - 30   TOTAL VOLUME (STONE RISK) 1550 mL/24 hr  600 - 1600   Total Volume: 1550 mL   URIC ACID, UR 43 8 mg/dL  Not Estab  URIC ACID, 24HR  mg/24 hr  250 - 750   BRUSHITE 1 59 ratio  0 00 - 3 00   CALCIUM OXALATE 2 14 ratio  0 00 - 6 00   MONOSODIUM URATE 4 35 ratio H 0 00 - 4 00   STRUVITE 0 08 ratio  0 00 - 1 00   URIC ACID (RATIO) 0 27 ratio  0 00 - 1 20   PLEASE NOTE (STONE RISK) Comment     Graphic analysis of results will follow via computer, mail, or      delivery  Performed at:  [address] [place] [address]  [id number]     : Tan Dueñas MD, Phone:  [phone]     Performed at:  [address]  [id number]     : Natalie Feliciano MD, Phone:  [phone]   CREATININE,UR 24HR 692 9 mg/24 hr L 800 0 - 1800 0   PRESERVED URINE 1550 mL/24 hr  600 - 1600   CYSTINE, QUANT, UR, 24HR 5 16 mg/24 hr L 10 00 - 100 00      (1) BASIC METABOLIC PROFILE [id number] 11:39AM Shawn Segura    Order Number: [id number]      Test Name Result Flag Reference   GLUCOSE,RANDM 95 mg/dL     If the patient is fasting, the ADA then defines impaired fasting glucose as > 100 mg/dL and diabetes as > or equal to 123 mg/dL       Specimen collection should occur prior to Sulfasalazine administration due to the potential for falsely depressed results  Specimen collection should occur prior to Sulfapyridine administration due to the potential for falsely elevated results  SODIUM 139 mmol/L  136-145   POTASSIUM 3 9 mmol/L  3 5-5 3   CHLORIDE 104 mmol/L  100-108   CARBON DIOXIDE 30 mmol/L  21-32   ANION GAP (CALC) 5 mmol/L  4-13   BLOOD UREA NITROGEN 15 mg/dL  5-25   CREATININE 0 89 mg/dL  0 60-1 30   Standardized to IDMS reference method   CALCIUM 9 2 mg/dL  8 3-10 1   eGFR 81 ml/min/1 73sq m     [facility] Disease Education Program recommendations are as follows:     GFR calculation is accurate only with a steady state creatinine     Chronic Kidney disease less than 60 ml/min/1 73 sq  meters     Kidney failure less than 15 ml/min/1 73 sq  meters  (1) URINALYSIS WITH MICROSCOPIC 08UDM8709 11:39AM Saray Askew    Order Number: [id number]      Test Name Result Flag Reference   COLOR Yellow     CLARITY Cloudy     PH UA 7 5  4 5-8 0   LEUKOCYTE ESTERASE UA Moderate A Negative   NITRITE UA Negative  Negative   PROTEIN UA 30 (1+) mg/dl A Negative   GLUCOSE UA Negative mg/dl  Negative   KETONES UA Negative mg/dl  Negative   UROBILINOGEN UA 0 2 E U /dl  0 2, 1 0 E U /dl   BILIRUBIN UA Negative  Negative   BLOOD UA Small A Negative   SPECIFIC GRAVITY UA 1 017  1 003-1 030   WBC UA Innumerable /hpf A None Seen, 0-5, 5-55, 5-65   RBC UA 2-4 /hpf A None Seen, 0-5   BACTERIA Occasional /hpf  None Seen, Occasional   EPITHELIAL CELLS Moderate /hpf A None Seen, Occasional        Health Management   Encounter for screening mammogram for malignant neoplasm of breast   MAMMO SCREENING BILATERAL W 3D & CAD; every 1 year; Last 80Iic3442; Next Due: 93Hjw2531;     Active    Signatures    Electronically signed by : Alease Kussmaul, DO; Jan 18 2018 11:01AM EST                       (Author)

## 2018-01-22 VITALS
SYSTOLIC BLOOD PRESSURE: 120 MMHG | HEIGHT: 62 IN | BODY MASS INDEX: 39.08 KG/M2 | RESPIRATION RATE: 16 BRPM | HEART RATE: 76 BPM | DIASTOLIC BLOOD PRESSURE: 84 MMHG | WEIGHT: 212.4 LBS

## 2018-01-23 VITALS — SYSTOLIC BLOOD PRESSURE: 112 MMHG | DIASTOLIC BLOOD PRESSURE: 68 MMHG | HEART RATE: 80 BPM

## 2018-01-23 NOTE — RESULT NOTES
Verified Results  (1) STONE RISK PROFILE, 24 HOUR URINE [id number] 08:07AM Anna Jen     Test Name Result Flag Reference   AMMONIA, URINE 69638 ug/dL  Not Estab  Vijay Audie  URINE 3 33 mg/L  Not Estab  MAGNESIUM 24 HOUR URINE 64 mg/24 hr  12 - 293   CITRATE URINE 297 mg/L  Not Estab  CALCIUM,UR 8 9 mg/dL  Not Estab  CHLORIDE,UR 61 mmol/L  Not Estab  CREATININE, UR 44 7 mg/dL  Not Estab  AMMONIA, 24 UR 16 mEq/24 hr  Not Estab  CALCIUM, 24UR 138 0 mg/24 hr  100 0 - 300 0   CHLORIDE, 24UR 95 mmol/24 hr L 110 - 250   CITRATE 24 HOUR URINE 460 mg/24 hr  320 - 1240   MAGNESIUM, UR 4 1 mg/dL  Not Estab  OSMOLALITY,  mOsmol/kg  300 - 900   OXALATE URINE (MG/SPEC) 8 mg/L  Not Estab  OXALATE, 24UR 12 mg/24 hr  4 - 31   PH, 24 HR URINE 6 9     PHOSPHORUS, UR 24 2 mg/dL  Not Estab  PHOSPHORUS, 24HR  1 mg/24 hr L 400 0 - 1300 0   POTASSIUM, UR 24 2 mmol/L  Not Estab  POTASSIUM, 24HR UR 37 5 mmol/24 hr  25 0 - 125 0   SODIUM, UR 82 mmol/L  Not Estab  SODIUM, 24HR  mmol/24 hr  39 - 258   SULFATE, UR, MEQ/L 12 mEq/L  Not Estab  SULFATE 24 HOUR URINE 19 mEq/24 hr  0 - 30   TOTAL VOLUME (STONE RISK) 1550 mL/24 hr  600 - 1600   Total Volume: 1550 mL   URIC ACID, UR 43 8 mg/dL  Not Estab  URIC ACID, 24HR  mg/24 hr  250 - 750   BRUSHITE 1 59 ratio  0 00 - 3 00   CALCIUM OXALATE 2 14 ratio  0 00 - 6 00   MONOSODIUM URATE 4 35 ratio H 0 00 - 4 00   STRUVITE 0 08 ratio  0 00 - 1 00   URIC ACID (RATIO) 0 27 ratio  0 00 - 1 20   PLEASE NOTE (STONE RISK) Comment     Graphic analysis of results will follow via computer, mail, or   delivery    Performed at:  [address]  [id number]  : Darrius Charles MD, Phone:  [phone]  Performed at:  [facility]  [id number]  : Isai Whittaker MD, Phone:  [phone]   CREATININE,UR 24HR 692 9 mg/24 hr L 800 0 - 1800 0   PRESERVED URINE 1550 mL/24 hr  600 - 1600   CYSTINE, QUANT, UR, 24HR 5 16 mg/24 hr L 10 00 - 100 00

## 2018-01-24 ENCOUNTER — HOSPITAL ENCOUNTER (OUTPATIENT)
Dept: RADIOLOGY | Age: 43
Discharge: HOME | End: 2018-01-24
Payer: COMMERCIAL

## 2018-01-24 VITALS
WEIGHT: 213 LBS | SYSTOLIC BLOOD PRESSURE: 128 MMHG | HEIGHT: 62 IN | DIASTOLIC BLOOD PRESSURE: 80 MMHG | BODY MASS INDEX: 39.2 KG/M2

## 2018-01-24 DIAGNOSIS — N20.0 CALCULUS OF KIDNEY: ICD-10-CM

## 2018-01-24 PROCEDURE — 76770 US EXAM ABDO BACK WALL COMP: CPT

## 2018-01-25 ENCOUNTER — TELEPHONE (OUTPATIENT)
Dept: NEPHROLOGY | Facility: HOSPITAL | Age: 43
End: 2018-01-25

## 2018-01-26 ENCOUNTER — TELEPHONE (OUTPATIENT)
Dept: NEPHROLOGY | Facility: CLINIC | Age: 43
End: 2018-01-26

## 2018-01-26 NOTE — TELEPHONE ENCOUNTER
----- Message from Kevin Alonzo DO sent at 1/25/2018 12:48 PM EST -----  Spoke with patient regarding results, I would like her to follow up with Urology please schedule her to follow up with Dr Wellington Richardson for urologic consultation for hydronephrosis and nephrolithiasis

## 2018-01-26 NOTE — TELEPHONE ENCOUNTER
I spoke with Sandra Waters in regards to scheduling an appointment with Urology  She prefers to schedule it herself due to schedule conflicts  I gave her the information to Dr Lennox Yung office

## 2018-02-05 ENCOUNTER — OFFICE VISIT (OUTPATIENT)
Dept: UROLOGY | Facility: CLINIC | Age: 43
End: 2018-02-05
Payer: COMMERCIAL

## 2018-02-05 VITALS
SYSTOLIC BLOOD PRESSURE: 123 MMHG | DIASTOLIC BLOOD PRESSURE: 83 MMHG | HEART RATE: 80 BPM | WEIGHT: 211 LBS | BODY MASS INDEX: 38.83 KG/M2 | HEIGHT: 62 IN

## 2018-02-05 DIAGNOSIS — N20.0 CALCULUS OF KIDNEY: Primary | ICD-10-CM

## 2018-02-05 DIAGNOSIS — N13.2 HYDRONEPHROSIS WITH RENAL AND URETERAL CALCULUS OBSTRUCTION: ICD-10-CM

## 2018-02-05 PROBLEM — N13.30 HYDRONEPHROSIS OF LEFT KIDNEY: Status: ACTIVE | Noted: 2018-02-05

## 2018-02-05 LAB
PROT 24H UR-MCNC: ABNORMAL G/DL
SL AMB  POCT GLUCOSE, UA: ABNORMAL
SL AMB LEUKOCYTE ESTERASE,UA: ABNORMAL
SL AMB POCT BILIRUBIN,UA: ABNORMAL
SL AMB POCT BLOOD,UA: ABNORMAL
SL AMB POCT CLARITY,UA: ABNORMAL
SL AMB POCT COLOR,UA: YELLOW
SL AMB POCT KETONES,UA: ABNORMAL
SL AMB POCT NITRITE,UA: ABNORMAL
SL AMB POCT PH,UA: 7.5
SL AMB POCT SPECIFIC GRAVITY,UA: ABNORMAL

## 2018-02-05 PROCEDURE — 99202 OFFICE O/P NEW SF 15 MIN: CPT | Performed by: UROLOGY

## 2018-02-05 PROCEDURE — 81000 URINALYSIS NONAUTO W/SCOPE: CPT | Performed by: UROLOGY

## 2018-02-05 NOTE — PROGRESS NOTES
Assessment/Plan:    Patient has moderate left hydronephrosis with calculus disease   patient has mild right hydronephrosis with previous calculus disease     plan will be to proceed with cysto bilateral  Retrograde pyelogram   We will also proceed with ureteroscopy and laser lithotripsy of calculus as necessary  No problem-specific Assessment & Plan notes found for this encounter  Problem List Items Addressed This Visit     Calculus of kidney - Primary    Relevant Orders    POCT urine dip (Completed)    Hydronephrosis with renal and ureteral calculus obstruction            Subjective:      Patient ID: Janet Rubio is a 38 y o  female  HPI       Initially found to have a significant UPJ calculus in 2016  She was treated with ureteroscopy and laser lithotripsy of the calculus  Follow-up imaging shows good resolution of the stone  She recently noted to have a renal ultrasound and KUB revealing new hydronephrosis on the left side  She is asymptomatic  She has had no fever or chills  There is no flank pain  Ultrasound report the stone size of 11 millimeters on the left side  She has had no gross hematuria  The following portions of the patient's history were reviewed and updated as appropriate: allergies, current medications, past family history, past medical history, past social history, past surgical history and problem list     Review of Systems   Constitutional: Negative  Respiratory: Negative  Cardiovascular: Negative  Genitourinary: Negative  Psychiatric/Behavioral: Negative  Objective:     Physical Exam        Exam reveals well-developed female in no acute distress  Chest is symmetrical there is no shortness of breath  Heart rate is regular  The abdomen is soft  There is a midline scar from   There is no pattern megaly or splenomegaly  There is no abdominal tenderness  Bladder is not distended  Lower extremities reveal no edema    Pelvic and genital examination is deferred

## 2018-02-20 RX ORDER — POTASSIUM CITRATE 15 MEQ/1
1 TABLET, EXTENDED RELEASE ORAL 2 TIMES DAILY
COMMUNITY
Start: 2018-01-18 | End: 2019-03-13

## 2018-02-20 NOTE — PRE-PROCEDURE INSTRUCTIONS
Pre-Surgery Instructions:   Medication Instructions    EPINEPHrine (EPIPEN) 0 3 mg/0 3 mL SOAJ Instructed patient per Anesthesia Guidelines   Potassium Citrate ER 15 MEQ (1620 MG) TBCR Instructed patient per Anesthesia Guidelines  PT INSTR ON ELIJAH CALL, ASC LOC , BRING PHOTO ID, MED LIST, INS INFO  WILL NOT TAKE POT CITR AM SURG  NO ASA/NSAIDS/VIT NOW ON ,SHOWER INSTR REV

## 2018-02-22 ENCOUNTER — ANESTHESIA (OUTPATIENT)
Dept: PERIOP | Facility: HOSPITAL | Age: 43
End: 2018-02-22
Payer: COMMERCIAL

## 2018-02-22 ENCOUNTER — APPOINTMENT (OUTPATIENT)
Dept: RADIOLOGY | Facility: HOSPITAL | Age: 43
End: 2018-02-22
Payer: COMMERCIAL

## 2018-02-22 ENCOUNTER — ANESTHESIA EVENT (OUTPATIENT)
Dept: PERIOP | Facility: HOSPITAL | Age: 43
End: 2018-02-22
Payer: COMMERCIAL

## 2018-02-22 ENCOUNTER — HOSPITAL ENCOUNTER (OUTPATIENT)
Facility: HOSPITAL | Age: 43
Setting detail: OUTPATIENT SURGERY
Discharge: HOME | End: 2018-02-22
Attending: UROLOGY | Admitting: UROLOGY
Payer: COMMERCIAL

## 2018-02-22 VITALS
TEMPERATURE: 100 F | OXYGEN SATURATION: 97 % | SYSTOLIC BLOOD PRESSURE: 118 MMHG | HEIGHT: 62 IN | DIASTOLIC BLOOD PRESSURE: 71 MMHG | WEIGHT: 205 LBS | BODY MASS INDEX: 37.73 KG/M2 | RESPIRATION RATE: 18 BRPM | HEART RATE: 67 BPM

## 2018-02-22 DIAGNOSIS — N13.30 HYDRONEPHROSIS OF LEFT KIDNEY: ICD-10-CM

## 2018-02-22 LAB
ANION GAP SERPL CALCULATED.3IONS-SCNC: 7 MMOL/L (ref 4–13)
BUN SERPL-MCNC: 17 MG/DL (ref 5–25)
CALCIUM SERPL-MCNC: 8.8 MG/DL (ref 8.3–10.1)
CHLORIDE SERPL-SCNC: 105 MMOL/L (ref 100–108)
CO2 SERPL-SCNC: 27 MMOL/L (ref 21–32)
CREAT SERPL-MCNC: 0.91 MG/DL (ref 0.6–1.3)
GFR SERPL CREATININE-BSD FRML MDRD: 79 ML/MIN/1.73SQ M
GLUCOSE P FAST SERPL-MCNC: 80 MG/DL (ref 65–99)
GLUCOSE SERPL-MCNC: 80 MG/DL (ref 65–140)
POTASSIUM SERPL-SCNC: 3.7 MMOL/L (ref 3.5–5.3)
SODIUM SERPL-SCNC: 139 MMOL/L (ref 136–145)

## 2018-02-22 PROCEDURE — 87086 URINE CULTURE/COLONY COUNT: CPT | Performed by: UROLOGY

## 2018-02-22 PROCEDURE — 74420 UROGRAPHY RTRGR +-KUB: CPT

## 2018-02-22 PROCEDURE — C1769 GUIDE WIRE: HCPCS | Performed by: UROLOGY

## 2018-02-22 PROCEDURE — 80048 BASIC METABOLIC PNL TOTAL CA: CPT | Performed by: UROLOGY

## 2018-02-22 PROCEDURE — C2617 STENT, NON-COR, TEM W/O DEL: HCPCS | Performed by: UROLOGY

## 2018-02-22 PROCEDURE — C1758 CATHETER, URETERAL: HCPCS | Performed by: UROLOGY

## 2018-02-22 PROCEDURE — 52356 CYSTO/URETERO W/LITHOTRIPSY: CPT | Performed by: UROLOGY

## 2018-02-22 DEVICE — STENT URETERAL 4.7FR 26CM INLAY OPTIMA: Type: IMPLANTABLE DEVICE | Status: FUNCTIONAL

## 2018-02-22 RX ORDER — FENTANYL CITRATE/PF 50 MCG/ML
25 SYRINGE (ML) INJECTION
Status: DISCONTINUED | OUTPATIENT
Start: 2018-02-22 | End: 2018-02-22 | Stop reason: HOSPADM

## 2018-02-22 RX ORDER — PROMETHAZINE HYDROCHLORIDE 25 MG/ML
12.5 INJECTION, SOLUTION INTRAMUSCULAR; INTRAVENOUS ONCE
Status: COMPLETED | OUTPATIENT
Start: 2018-02-22 | End: 2018-02-22

## 2018-02-22 RX ORDER — METOCLOPRAMIDE HYDROCHLORIDE 5 MG/ML
INJECTION INTRAMUSCULAR; INTRAVENOUS AS NEEDED
Status: DISCONTINUED | OUTPATIENT
Start: 2018-02-22 | End: 2018-02-22 | Stop reason: SURG

## 2018-02-22 RX ORDER — OXYCODONE HYDROCHLORIDE AND ACETAMINOPHEN 5; 325 MG/1; MG/1
1 TABLET ORAL EVERY 4 HOURS PRN
Qty: 18 TABLET | Refills: 0 | Status: SHIPPED | OUTPATIENT
Start: 2018-02-22 | End: 2018-02-27

## 2018-02-22 RX ORDER — SODIUM CHLORIDE, SODIUM LACTATE, POTASSIUM CHLORIDE, CALCIUM CHLORIDE 600; 310; 30; 20 MG/100ML; MG/100ML; MG/100ML; MG/100ML
200 INJECTION, SOLUTION INTRAVENOUS CONTINUOUS
Status: CANCELLED | OUTPATIENT
Start: 2018-02-22

## 2018-02-22 RX ORDER — CEFUROXIME AXETIL 500 MG/1
500 TABLET ORAL EVERY 12 HOURS SCHEDULED
Qty: 6 TABLET | Refills: 0 | Status: SHIPPED | OUTPATIENT
Start: 2018-02-22 | End: 2018-02-25

## 2018-02-22 RX ORDER — FENTANYL CITRATE 50 UG/ML
INJECTION, SOLUTION INTRAMUSCULAR; INTRAVENOUS AS NEEDED
Status: DISCONTINUED | OUTPATIENT
Start: 2018-02-22 | End: 2018-02-22 | Stop reason: SURG

## 2018-02-22 RX ORDER — GLYCOPYRROLATE 0.2 MG/ML
INJECTION INTRAMUSCULAR; INTRAVENOUS AS NEEDED
Status: DISCONTINUED | OUTPATIENT
Start: 2018-02-22 | End: 2018-02-22 | Stop reason: SURG

## 2018-02-22 RX ORDER — SODIUM CHLORIDE, SODIUM LACTATE, POTASSIUM CHLORIDE, CALCIUM CHLORIDE 600; 310; 30; 20 MG/100ML; MG/100ML; MG/100ML; MG/100ML
20 INJECTION, SOLUTION INTRAVENOUS CONTINUOUS
Status: DISCONTINUED | OUTPATIENT
Start: 2018-02-22 | End: 2018-02-22 | Stop reason: HOSPADM

## 2018-02-22 RX ORDER — SCOLOPAMINE TRANSDERMAL SYSTEM 1 MG/1
1 PATCH, EXTENDED RELEASE TRANSDERMAL ONCE
Status: COMPLETED | OUTPATIENT
Start: 2018-02-22 | End: 2018-02-22

## 2018-02-22 RX ORDER — OXYCODONE HYDROCHLORIDE AND ACETAMINOPHEN 5; 325 MG/1; MG/1
1 TABLET ORAL EVERY 4 HOURS PRN
Status: CANCELLED | OUTPATIENT
Start: 2018-02-22

## 2018-02-22 RX ORDER — SODIUM CHLORIDE, SODIUM LACTATE, POTASSIUM CHLORIDE, CALCIUM CHLORIDE 600; 310; 30; 20 MG/100ML; MG/100ML; MG/100ML; MG/100ML
125 INJECTION, SOLUTION INTRAVENOUS CONTINUOUS
Status: DISCONTINUED | OUTPATIENT
Start: 2018-02-22 | End: 2018-02-22 | Stop reason: HOSPADM

## 2018-02-22 RX ORDER — ROCURONIUM BROMIDE 10 MG/ML
INJECTION, SOLUTION INTRAVENOUS AS NEEDED
Status: DISCONTINUED | OUTPATIENT
Start: 2018-02-22 | End: 2018-02-22 | Stop reason: SURG

## 2018-02-22 RX ORDER — MAGNESIUM HYDROXIDE 1200 MG/15ML
LIQUID ORAL AS NEEDED
Status: DISCONTINUED | OUTPATIENT
Start: 2018-02-22 | End: 2018-02-22 | Stop reason: HOSPADM

## 2018-02-22 RX ORDER — ONDANSETRON 2 MG/ML
INJECTION INTRAMUSCULAR; INTRAVENOUS AS NEEDED
Status: DISCONTINUED | OUTPATIENT
Start: 2018-02-22 | End: 2018-02-22 | Stop reason: SURG

## 2018-02-22 RX ORDER — PROPOFOL 10 MG/ML
INJECTION, EMULSION INTRAVENOUS AS NEEDED
Status: DISCONTINUED | OUTPATIENT
Start: 2018-02-22 | End: 2018-02-22 | Stop reason: SURG

## 2018-02-22 RX ORDER — LIDOCAINE HYDROCHLORIDE 10 MG/ML
INJECTION, SOLUTION INFILTRATION; PERINEURAL AS NEEDED
Status: DISCONTINUED | OUTPATIENT
Start: 2018-02-22 | End: 2018-02-22 | Stop reason: SURG

## 2018-02-22 RX ADMIN — ONDANSETRON 8 MG: 2 INJECTION INTRAMUSCULAR; INTRAVENOUS at 15:41

## 2018-02-22 RX ADMIN — ROCURONIUM BROMIDE 10 MG: 10 INJECTION INTRAVENOUS at 16:07

## 2018-02-22 RX ADMIN — SODIUM CHLORIDE, SODIUM LACTATE, POTASSIUM CHLORIDE, AND CALCIUM CHLORIDE 125 ML/HR: .6; .31; .03; .02 INJECTION, SOLUTION INTRAVENOUS at 13:57

## 2018-02-22 RX ADMIN — PROMETHAZINE HYDROCHLORIDE 12.5 MG: 25 INJECTION INTRAMUSCULAR; INTRAVENOUS at 17:12

## 2018-02-22 RX ADMIN — SCOPALAMINE 1 PATCH: 1 PATCH, EXTENDED RELEASE TRANSDERMAL at 14:12

## 2018-02-22 RX ADMIN — FENTANYL CITRATE 50 MCG: 50 INJECTION, SOLUTION INTRAMUSCULAR; INTRAVENOUS at 15:55

## 2018-02-22 RX ADMIN — SODIUM CHLORIDE, SODIUM LACTATE, POTASSIUM CHLORIDE, AND CALCIUM CHLORIDE 125 ML/HR: .6; .31; .03; .02 INJECTION, SOLUTION INTRAVENOUS at 17:27

## 2018-02-22 RX ADMIN — ROCURONIUM BROMIDE 5 MG: 10 INJECTION INTRAVENOUS at 16:14

## 2018-02-22 RX ADMIN — DEXAMETHASONE SODIUM PHOSPHATE 5 MG: 10 INJECTION INTRAMUSCULAR; INTRAVENOUS at 15:41

## 2018-02-22 RX ADMIN — METOCLOPRAMIDE 10 MG: 5 INJECTION, SOLUTION INTRAMUSCULAR; INTRAVENOUS at 15:41

## 2018-02-22 RX ADMIN — CEFAZOLIN SODIUM 2000 MG: 2 SOLUTION INTRAVENOUS at 15:38

## 2018-02-22 RX ADMIN — SCOPALAMINE 1 PATCH: 1 PATCH, EXTENDED RELEASE TRANSDERMAL at 15:24

## 2018-02-22 RX ADMIN — NEOSTIGMINE METHYLSULFATE 5 MG: 1 INJECTION, SOLUTION INTRAMUSCULAR; INTRAVENOUS; SUBCUTANEOUS at 16:27

## 2018-02-22 RX ADMIN — FENTANYL CITRATE 50 MCG: 50 INJECTION, SOLUTION INTRAMUSCULAR; INTRAVENOUS at 15:43

## 2018-02-22 RX ADMIN — PROPOFOL 150 MG: 10 INJECTION, EMULSION INTRAVENOUS at 15:36

## 2018-02-22 RX ADMIN — LIDOCAINE HYDROCHLORIDE 50 MG: 10 INJECTION, SOLUTION INFILTRATION; PERINEURAL at 15:36

## 2018-02-22 RX ADMIN — FENTANYL CITRATE 50 MCG: 50 INJECTION, SOLUTION INTRAMUSCULAR; INTRAVENOUS at 15:36

## 2018-02-22 RX ADMIN — GLYCOPYRROLATE 0.6 MG: 0.2 INJECTION, SOLUTION INTRAMUSCULAR; INTRAVENOUS at 16:27

## 2018-02-22 NOTE — OP NOTE
OPERATIVE REPORT  PATIENT NAME: Zachery Rubio    :  1975  MRN: [id number]  Pt Location: [facility] CYSTO ROOM 01    SURGERY DATE: 2018    Surgeon(s) and Role:     * Chris Suazo MD - Primary    Preop Diagnosis:  Hydronephrosis of left kidney [N13 30]  Left renal calculus     Post-Op Diagnosis Codes: * Hydronephrosis of left kidney [N13 30]  12 mm calculus left kidney, mid polar      Procedure(s) (LRB):  CYSTOSCOPY, LEFT URETEROSCOPY AND RENAL ENDOSCOPY WITH LITHOTRIPSY OF RENAL CALCULUS WITH HOLMIUM LASER,  BILATERAL RETROGRADE PYELOGRAM AND INSERTION OF LEFT URETERAL STENT (Left)    Specimen(s):  ID Type Source Tests Collected by Time Destination   A : Urine culture, from Cysto Urine Urine, Cystoscopic URINE CULTURE Chris Suazo MD 2018 1608        Estimated Blood Loss:   Minimal    Drains:       Anesthesia Type:   General    Operative Indications:  Hydronephrosis of left kidney [N13 30]  Left renal calculus    Operative Findings:  12 mm calculus left kidney mid polar obstructing the infundibulum  Complications:   None    Procedure and Technique:  Patient has had past history of stone disease  She presented for evaluation of hydronephrosis found on ultrasound  Options of management were discussed  She is now for bilateral retrograde study and definitive management of a left renal calculus  She is brought to the operating room identified and transferred to the OR table  General anesthesia was then administered  Patient was then placed in lithotomy position and the genitalia were prepped with Betadine  A confirmatory time-out was then performed  Cystoscopy then followed  The patient had a moderate cystocele  There was short urethral length  The bladder was otherwise unremarkable  There was no tumor, or stone present in the bladder  Bilateral retrogrades were then obtained  The right side showed narrowing of the proximal ureter  The renal pelvis was not dilated    The upper pole calices were dilated  No filling defects were seen  The left side showed the ureter to be unremarkable  There appeared to be a stone in the mid polar region obstructing the infundibulum to that area  Dilatation of the upper caliceal system was also seen  A wire was then placed into the left ureter and renal unit  A access sheath was then passed  This could be passed up to the level of the kidney  The 8 5 HD renal scope was then introduced  The kidney was then inspected the upper calices were unremarkable there was no obstructing abnormality or stone present  There was a large approximately 12 mm stone obstructing the mid polar infundibulum  The lower pole appeared also to be normal   Using a holmium laser the stone was completely fragmented  Much of this was irrigated through the access sheath  After completion of the laser lithotripsy the wire was reinserted  A 4 7 x 26 cm nephroureteral stent was then inserted and confirmed by fluoroscopy  All instruments were removed after placement of the stent  The stent was secured to was suture which she will remove in 10 days    She tolerated the procedure well was awakened and transferred to PACU in good condition   I was present for the entire procedure    Patient Disposition:  PACU     SIGNATURE: Kassi Sawyer MD  DATE: February 22, 2018  TIME: 4:34 PM

## 2018-02-22 NOTE — ANESTHESIA POSTPROCEDURE EVALUATION
Post-Op Assessment Note      CV Status:  Stable    Mental Status:  Alert and awake    Hydration Status:  Euvolemic    PONV Controlled:  Controlled    Airway Patency:  Patent    Post Op Vitals Reviewed: Yes          Staff: Anesthesiologist, CRNA           BP   90/51   Temp   97 2   Pulse  74   Resp   16   SpO2   100

## 2018-02-22 NOTE — H&P (VIEW-ONLY)
HISTORY AND PHYSICAL   Admission Date: (Not on file)    Patient Identifiers: Eric Gunter (MRN: [id number])  Urology, Keyonna Dexter PA-C  Date of Service: 2018    History of Present Illness:     Eric Gunter is a 38 y o  old with a history of  a significant UPJ calculus in 2016  She was treated with ureteroscopy and laser lithotripsy of the calculus  Follow-up imaging shows good resolution of the stone  She was recently noted to have a renal ultrasound and KUB revealing new hydronephrosis on the left side  She is asymptomatic  She has had no fever or chills  There is no flank pain  Ultrasound report the stone size of 11 millimeters on the left side  She has had no gross hematuria           Past Medical, Past Surgical History:     Past Medical History:   Diagnosis Date    Calculus of kidney     DJD (degenerative joint disease)     Eczema     Hydronephrosis     Nephrolithiasis    :    Past Surgical History:   Procedure Laterality Date     SECTION      X 2    CYSTOSCOPY      DILATION AND CURETTAGE OF UTERUS      NM CYSTO/URETERO W/LITHOTRIPSY &INDWELL STENT INSRT Right 2016    Procedure: CYSTOSCOPY; URETEROSCOPY; HOLMIUM LASER LITHOTRIPSY; BASKET STONE EXTRACTION; RETROGRADE PYELOGRAM; STENT PLACEMENT ;  Surgeon: Navin Sousa MD;  Location: [facility];  Service: Urology    TONSILLECTOMY      TUBAL LIGATION      WISDOM TOOTH EXTRACTION     :    Medications, Allergies:     Current Outpatient Prescriptions:     EPINEPHrine (EPIPEN) 0 3 mg/0 3 mL SOAJ, Inject 0 3 mL into the shoulder, thigh, or buttocks once for 1 dose, Disp: 0 3 mL, Rfl: 1    Potassium Citrate ER 15 MEQ (1620 MG) TBCR, Take 1 tablet by mouth 2 (two) times a day, Disp: , Rfl:     Allergies:  No Known Allergies:    Social and Family History:   Social History:   Social History   Substance Use Topics    Smoking status: Never Smoker    Smokeless tobacco: Never Used    Alcohol use No        History Smoking Status    Never Smoker   Smokeless Tobacco    Never Used       Family History:  Family History   Problem Relation Age of Onset    Diabetes Mother     Heart attack Father     Prostate cancer Father     Heart disease Father     Hyperlipidemia Father     Hypertension Father     Multiple sclerosis Sister    :     Review of Systems:     General: Fever, chills, or night sweats: negative  Cardiac: Negative for chest pain  Pulmonary: Negative for shortness of breath  Gastrointestinal: Abdominal pain negative  Nausea, vomiting, or diarrhea negative,  Genitourinary: See HPI above  Patient does not have hematuria  All other systems queried were negative  Physical Exam:   General: Patient is pleasant and in NAD  Awake and alert  There were no vitals taken for this visit  Cardiac: regular rate  Peripheral edema: negative  Pulmonary: Non-labored breathing lungs clear  Abdomen: Soft, non-tender, non-distended  No surgical scars  No masses, tenderness, hernias noted  Genitourinary: Negative CVA tenderness, negative suprapubic tenderness  Labs:     Lab Results   Component Value Date    HGB 11 9 09/08/2016    HCT 37 0 09/08/2016    WBC 15 77 (H) 09/08/2016     09/08/2016   ]    Lab Results   Component Value Date     01/05/2018    K 3 9 01/05/2018     01/05/2018    CO2 30 01/05/2018    BUN 15 01/05/2018    CREATININE 0 89 01/05/2018    CALCIUM 9 2 01/05/2018    GLUCOSE 95 01/05/2018   ]    Imaging:   I personally reviewed the images and report of the following studies, and reviewed them with the patient:  RENAL ULTRASOUND     IMPRESSION:        1  Mild right-sided hydronephrosis without change from prior study  Right kidney lower pole subcentimeter nonobstructing calculus  2   Mild to moderate left-sided hydronephrosis also similar to the prior study with an approximately 11 mm interpolar calculus at the confluence of the infundibula    Additional lower pole calculus is seen Note is made of bilateral ureteral jets          ASSESSMENT:   1  Bilateral mild to moderate hydronephrosis  2  11mm left renal calculi    PLAN:   - cysto- bilateral retrograde pyelogram, ureteroscopy with laser lithotripsy and insertion ureteral stent    Gemini Carpenter PA-C

## 2018-02-22 NOTE — ANESTHESIA PREPROCEDURE EVALUATION
Review of Systems/Medical History  Patient summary reviewed  Chart reviewed  History of anesthetic complications PONV    Cardiovascular  Negative cardio ROS    Pulmonary  Negative pulmonary ROS        GI/Hepatic  Negative GI/hepatic ROS          Kidney stones,   Comment: Hydronephrosis     Endo/Other    Obesity    GYN  Negative gynecology ROS          Hematology  Negative hematology ROS      Musculoskeletal    Arthritis     Neurology  Negative neurology ROS      Psychology   Negative psychology ROS              Physical Exam    Airway    Mallampati score: II  TM Distance: >3 FB  Neck ROM: full     Dental   No notable dental hx     Cardiovascular  Comment: Negative ROS, Rhythm: regular, Rate: normal, Cardiovascular exam normal    Pulmonary  Pulmonary exam normal Breath sounds clear to auscultation,     Other Findings        Anesthesia Plan  ASA Score- 2     Anesthesia Type- general with ASA Monitors  Additional Monitors:   Airway Plan: LMA  Plan Factors-    Induction- intravenous  Postoperative Plan- Plan for postoperative opioid use  Informed Consent- Anesthetic plan and risks discussed with patient  I personally reviewed this patient with the CRNA  Discussed and agreed on the Anesthesia Plan with the CRNA  Maria Elena Rothman Recent labs personally reviewed:  Lab Results   Component Value Date    WBC 15 77 (H) 09/08/2016    HGB 11 9 09/08/2016     09/08/2016     Lab Results   Component Value Date     01/05/2018    K 3 9 01/05/2018    BUN 15 01/05/2018    CREATININE 0 89 01/05/2018    GLUCOSE 95 01/05/2018     Lab Results   Component Value Date    PTT 37 (H) 08/23/2016      Lab Results   Component Value Date    INR 1 02 08/23/2016       Blood type A    Lab Results   Component Value Date    HGBA1C 6 0 07/06/2017       I, Jorge Taylor MD, have personally seen and evaluated the patient prior to anesthetic care    I have reviewed the pre-anesthetic record, and other medical records if appropriate to the anesthetic care  If a CRNA is involved in the case, I have reviewed the CRNA assessment, if present, and agree  Risks/benefits and alternatives discussed with patient including possible PONV, sore throat, and possibility of rare anesthetic and surgical emergencies

## 2018-02-22 NOTE — DISCHARGE INSTRUCTIONS
Cystoscopy   WHAT YOU NEED TO KNOW:   A cystoscopy is a procedure to look inside of your urethra and bladder using a cystoscope  A cystoscope is a small tube with a light and magnifying camera on the end  The procedure is used to diagnose and treat conditions of the bladder, urethra, and prostate  The procedure is also done to remove stones or blood clots from the urethra or bladder  Your healthcare provider may do other tests, such as ureteroscopy, during a cystoscopy  DISCHARGE INSTRUCTIONS:   Call 911 if:   · You suddenly have chest pain or trouble breathing  Seek care immediately if:   · Your urine turns from pink to red, or you have clots in your urine  · You cannot urinate and your bladder feels full  · Your pain or burning becomes worse or lasts longer than 2 days  Contact your healthcare provider or urologist if:   · Your urine stays pink for longer than 3 days  · You urinate less than normal, or still feel like you have to urinate after you use the bathroom  · Your skin is itchy, swollen, or has a new rash  · You have a fever and chills  · You have questions or concerns about your condition or care  Medicines: You may  be given any of the following:  · Antibiotics  help treat or prevent a bacterial infection  · Acetaminophen  decreases pain and fever  It is available without a doctor's order  Ask how much to take and how often to take it  Follow directions  Read the labels of all other medicines you are using to see if they also contain acetaminophen, or ask your doctor or pharmacist  Acetaminophen can cause liver damage if not taken correctly  Do not use more than 4 grams (4,000 milligrams) total of acetaminophen in one day  · Take your medicine as directed  Contact your healthcare provider if you think your medicine is not helping or if you have side effects  Tell him or her if you are allergic to any medicine   Keep a list of the medicines, vitamins, and herbs you take  Include the amounts, and when and why you take them  Bring the list or the pill bottles to follow-up visits  Carry your medicine list with you in case of an emergency  Follow up with your healthcare provider as directed: You may need to have another cystoscopy  Write down your questions so you remember to ask them during your visits  Self-care:   · Drink at least 3 to 4 glasses of water daily for 2 days after your procedure  Do not drink acidic juices such as orange juice and lemonade  Drink water to help prevent blood clots from forming  It can also help decrease the amount of acid in your urine  Acid in your urine may increase the burning feeling when you urinate  · Sit in a warm tub of water  Warm water may relieve pain and bladder spasms  · Do not have sex  until your healthcare provider tells you it is okay  Sex may increase your risk for a urinary tract infection  © 2017 [employer] Information is for End User's use only and may not be sold, redistributed or otherwise used for commercial purposes  All illustrations and images included in CareNotes® are the copyrighted property of [employer]  or [employer]  The above information is an  only  It is not intended as medical advice for individual conditions or treatments  Talk to your doctor, nurse or pharmacist before following any medical regimen to see if it is safe and effective for you  How to Strain Your Urine   WHAT YOU NEED TO KNOW:   Urinate into a strainer (funnel with a fine mesh on the bottom) or glass jar to collect kidney stones  DISCHARGE INSTRUCTIONS:   Medicines:   · Pain medicine: You may be given medicine to take away or decrease pain  Do not wait until the pain is severe before you take your medicine  · NSAIDs:  These medicines decrease swelling, pain, and fever  NSAIDs are available without a doctor's order  Ask which medicine is right for you   Ask how much to take and when to take it  Take as directed  NSAIDs can cause stomach bleeding and kidney problems if not taken correctly  · Nausea medicine: This medicine calms your stomach and prevents or controls vomiting  · Take your medicine as directed  Contact your healthcare provider if you think your medicine is not helping or if you have side effects  Tell him of her if you are allergic to any medicine  Keep a list of the medicines, vitamins, and herbs you take  Include the amounts, and when and why you take them  Bring the list or the pill bottles to follow-up visits  Carry your medicine list with you in case of an emergency  Drink liquids as directed:  Drink about 3 liters of liquids each day, or as directed  That equals about 12 glasses of water or fruit juice  Half of your total daily liquids should be water  Limit coffee, tea, and soda to 2 cups daily  Your urine will be pale and clear if you are drinking enough liquid  Self-care:   · Activity:  Exercise, such as walking, may help decrease your pain  · Avoid heat:  Heat may cause you to sweat, urinate less, and become dehydrated  Follow up with your healthcare provider or urologist as directed:  Write down your questions so you remember to ask them during your visits  Contact your healthcare provider or urologist if:   · You have a fever and chills  · Your urine looks cloudy or has a bad smell  · You have burning pain when you urinate  · You have trouble urinating  · You are vomiting and it does not get better, even after you take medicine  · You have questions or concerns about your condition or care  Return to the emergency department if:   · You are not able to urinate  · You have severe pain in your lower abdomen or side  · Your heart flutters or beats faster than usual   © 2017 [employer] Information is for End User's use only and may not be sold, redistributed or otherwise used for commercial purposes  All illustrations and images included in CareNotes® are the copyrighted property of [employer]  or Jon Penn  The above information is an  only  It is not intended as medical advice for individual conditions or treatments  Talk to your doctor, nurse or pharmacist before following any medical regimen to see if it is safe and effective for you

## 2018-02-24 ENCOUNTER — HOSPITAL ENCOUNTER (EMERGENCY)
Facility: HOSPITAL | Age: 43
Discharge: HOME | End: 2018-02-24
Attending: EMERGENCY MEDICINE | Admitting: EMERGENCY MEDICINE
Payer: COMMERCIAL

## 2018-02-24 ENCOUNTER — APPOINTMENT (EMERGENCY)
Dept: RADIOLOGY | Facility: HOSPITAL | Age: 43
End: 2018-02-24
Payer: COMMERCIAL

## 2018-02-24 VITALS
WEIGHT: 205 LBS | HEIGHT: 62 IN | SYSTOLIC BLOOD PRESSURE: 103 MMHG | HEART RATE: 106 BPM | RESPIRATION RATE: 18 BRPM | BODY MASS INDEX: 37.73 KG/M2 | TEMPERATURE: 99 F | DIASTOLIC BLOOD PRESSURE: 72 MMHG | OXYGEN SATURATION: 96 %

## 2018-02-24 DIAGNOSIS — N39.0 UTI (URINARY TRACT INFECTION): ICD-10-CM

## 2018-02-24 DIAGNOSIS — E86.0 DEHYDRATION: Primary | ICD-10-CM

## 2018-02-24 DIAGNOSIS — R50.9 FEVER: ICD-10-CM

## 2018-02-24 DIAGNOSIS — B37.49 CANDIDAL URINARY TRACT INFECTION: ICD-10-CM

## 2018-02-24 LAB
ALBUMIN SERPL BCP-MCNC: 3 G/DL (ref 3.5–5)
ALP SERPL-CCNC: 63 U/L (ref 46–116)
ALT SERPL W P-5'-P-CCNC: 15 U/L (ref 12–78)
ANION GAP SERPL CALCULATED.3IONS-SCNC: 8 MMOL/L (ref 4–13)
AST SERPL W P-5'-P-CCNC: 10 U/L (ref 5–45)
BACTERIA UR CULT: ABNORMAL
BACTERIA UR QL AUTO: ABNORMAL /HPF
BASOPHILS # BLD AUTO: 0.03 THOUSANDS/ΜL (ref 0–0.1)
BASOPHILS NFR BLD AUTO: 0 % (ref 0–1)
BILIRUB SERPL-MCNC: 0.49 MG/DL (ref 0.2–1)
BILIRUB UR QL STRIP: ABNORMAL
BUN SERPL-MCNC: 17 MG/DL (ref 5–25)
CALCIUM SERPL-MCNC: 8.4 MG/DL (ref 8.3–10.1)
CHLORIDE SERPL-SCNC: 102 MMOL/L (ref 100–108)
CLARITY UR: ABNORMAL
CO2 SERPL-SCNC: 26 MMOL/L (ref 21–32)
COLOR UR: ABNORMAL
COLOR, POC: NORMAL
CREAT SERPL-MCNC: 1.46 MG/DL (ref 0.6–1.3)
EOSINOPHIL # BLD AUTO: 0.07 THOUSAND/ΜL (ref 0–0.61)
EOSINOPHIL NFR BLD AUTO: 1 % (ref 0–6)
ERYTHROCYTE [DISTWIDTH] IN BLOOD BY AUTOMATED COUNT: 15.8 % (ref 11.6–15.1)
GFR SERPL CREATININE-BSD FRML MDRD: 45 ML/MIN/1.73SQ M
GLUCOSE SERPL-MCNC: 108 MG/DL (ref 65–140)
GLUCOSE UR STRIP-MCNC: NEGATIVE MG/DL
HCT VFR BLD AUTO: 35.1 % (ref 34.8–46.1)
HGB BLD-MCNC: 11.3 G/DL (ref 11.5–15.4)
HGB UR QL STRIP.AUTO: ABNORMAL
KETONES UR STRIP-MCNC: ABNORMAL MG/DL
LACTATE SERPL-SCNC: 0.9 MMOL/L (ref 0.5–2)
LEUKOCYTE ESTERASE UR QL STRIP: ABNORMAL
LYMPHOCYTES # BLD AUTO: 0.54 THOUSANDS/ΜL (ref 0.6–4.47)
LYMPHOCYTES NFR BLD AUTO: 4 % (ref 14–44)
MCH RBC QN AUTO: 25.7 PG (ref 26.8–34.3)
MCHC RBC AUTO-ENTMCNC: 32.2 G/DL (ref 31.4–37.4)
MCV RBC AUTO: 80 FL (ref 82–98)
MONOCYTES # BLD AUTO: 1.12 THOUSAND/ΜL (ref 0.17–1.22)
MONOCYTES NFR BLD AUTO: 8 % (ref 4–12)
NEUTROPHILS # BLD AUTO: 11.73 THOUSANDS/ΜL (ref 1.85–7.62)
NEUTS SEG NFR BLD AUTO: 87 % (ref 43–75)
NITRITE UR QL STRIP: NEGATIVE
NON-SQ EPI CELLS URNS QL MICRO: ABNORMAL /HPF
NRBC BLD AUTO-RTO: 0 /100 WBCS
PH UR STRIP.AUTO: 6 [PH] (ref 4.5–8)
PLATELET # BLD AUTO: 238 THOUSANDS/UL (ref 149–390)
PMV BLD AUTO: 10.3 FL (ref 8.9–12.7)
POTASSIUM SERPL-SCNC: 3.4 MMOL/L (ref 3.5–5.3)
PROT SERPL-MCNC: 7 G/DL (ref 6.4–8.2)
PROT UR STRIP-MCNC: >=300 MG/DL
RBC # BLD AUTO: 4.39 MILLION/UL (ref 3.81–5.12)
RBC #/AREA URNS AUTO: ABNORMAL /HPF
SODIUM SERPL-SCNC: 136 MMOL/L (ref 136–145)
SP GR UR STRIP.AUTO: 1.02 (ref 1–1.03)
UROBILINOGEN UR QL STRIP.AUTO: 0.2 E.U./DL
WBC # BLD AUTO: 13.53 THOUSAND/UL (ref 4.31–10.16)
WBC #/AREA URNS AUTO: ABNORMAL /HPF

## 2018-02-24 PROCEDURE — 80053 COMPREHEN METABOLIC PANEL: CPT | Performed by: EMERGENCY MEDICINE

## 2018-02-24 PROCEDURE — 96360 HYDRATION IV INFUSION INIT: CPT

## 2018-02-24 PROCEDURE — 85025 COMPLETE CBC W/AUTO DIFF WBC: CPT | Performed by: EMERGENCY MEDICINE

## 2018-02-24 PROCEDURE — 74176 CT ABD & PELVIS W/O CONTRAST: CPT

## 2018-02-24 PROCEDURE — 87086 URINE CULTURE/COLONY COUNT: CPT

## 2018-02-24 PROCEDURE — 81001 URINALYSIS AUTO W/SCOPE: CPT

## 2018-02-24 PROCEDURE — 96361 HYDRATE IV INFUSION ADD-ON: CPT

## 2018-02-24 PROCEDURE — 36415 COLL VENOUS BLD VENIPUNCTURE: CPT | Performed by: EMERGENCY MEDICINE

## 2018-02-24 PROCEDURE — 99284 EMERGENCY DEPT VISIT MOD MDM: CPT

## 2018-02-24 PROCEDURE — 83605 ASSAY OF LACTIC ACID: CPT | Performed by: EMERGENCY MEDICINE

## 2018-02-24 PROCEDURE — 81002 URINALYSIS NONAUTO W/O SCOPE: CPT | Performed by: EMERGENCY MEDICINE

## 2018-02-24 RX ORDER — ACETAMINOPHEN 325 MG/1
975 TABLET ORAL ONCE
Status: COMPLETED | OUTPATIENT
Start: 2018-02-24 | End: 2018-02-24

## 2018-02-24 RX ORDER — FLUCONAZOLE 200 MG/1
200 TABLET ORAL DAILY
Qty: 7 TABLET | Refills: 0 | Status: SHIPPED | OUTPATIENT
Start: 2018-02-24 | End: 2018-03-03

## 2018-02-24 RX ORDER — SULFAMETHOXAZOLE AND TRIMETHOPRIM 800; 160 MG/1; MG/1
1 TABLET ORAL 2 TIMES DAILY
Qty: 14 TABLET | Refills: 0 | Status: SHIPPED | OUTPATIENT
Start: 2018-02-24 | End: 2018-03-03

## 2018-02-24 RX ADMIN — ACETAMINOPHEN 975 MG: 325 TABLET, FILM COATED ORAL at 10:27

## 2018-02-24 RX ADMIN — SODIUM CHLORIDE 1000 ML: 0.9 INJECTION, SOLUTION INTRAVENOUS at 10:27

## 2018-02-24 RX ADMIN — SODIUM CHLORIDE 1000 ML: 0.9 INJECTION, SOLUTION INTRAVENOUS at 11:59

## 2018-02-24 NOTE — DISCHARGE INSTRUCTIONS
Dehydration   WHAT YOU NEED TO KNOW:   What is dehydration? Dehydration is a condition that develops when your body does not have enough fluid  You may become dehydrated if you do not drink enough water or lose too much fluid  Fluid loss may also cause loss of electrolytes (minerals), such as sodium  What increases my risk for dehydration? · Vomiting, diarrhea, or fever    · Being in the sun or heat for too long    · Sweating while playing sports    · Diseases, such as stroke, diabetes, or infections    · Medicines that cause you to lose water and salt, such as diuretics (water pills)    · Older age with decreased ability to sense thirst or to urinate  What are the signs and symptoms of dehydration? · Dry eyes or mouth    · Increased thirst    · Dark yellow urine    · Urinating little or not at all    · Tiredness or body weakness    · Headache, dizziness, or confusion    · Irregular or fast breathing, fast or pounding heartbeat, and low blood pressure    · Sudden weight loss  How is dehydration diagnosed? Your healthcare provider will examine you and check your breathing and heartbeat  He or she will look at your eyes, skin, mouth, and tongue  He or she will ask you how much liquid you have been drinking, and how much you are urinating  Tell him or her if you have been vomiting or have diarrhea  Blood and urine tests are used to check your electrolyte levels  The tests may show the cause of your dehydration, such as infection or diabetes  They may also show if your kidneys are working correctly  How is dehydration treated? · Oral liquids:      ¨ If you are mildly to moderately dehydrated, you may need an oral rehydration solution (ORS)  This drink contains the right amount of salt, sugar, and minerals in water to replace body fluids  Ask your healthcare provider where you can get an ORS  ¨ Drink an ORS in small amounts if you have been vomiting  If you vomit, wait 30 minutes and try again  Ask healthcare providers how much ORS you need when you are dehydrated and how often you should drink it  ¨ A sports drink is not  the same as an ORS  Do not drink sports drinks without asking your healthcare provider  ¨ Do not drink soft drinks or fruit juices  These can make your condition worse  · You may receive fluid through an IV  Electrolytes may also be included in the fluid  · Hypodermoclysis gives your body a large amount of water quickly  The water is given into the deepest layer of your skin  Ask your healthcare provider for more information about hypodermoclysis  What can I do to prevent dehydration? · Drink liquids as directed  Liquids that contain water, sugar, and minerals can help your body hold in fluid and help prevent dehydration  Drink liquids throughout the day, not just when you feel thirsty  Men should drink about 3 liters (13 eight-ounce cups) of liquid each day  Women should drink about 2 liters (9 eight-ounce cups) of liquid each day  Drink even more liquid if you will be outdoors, in the sun for a long time, or exercising  · Stay cool  Limit the time you spend outdoors during the hottest part of the day  Dress in lightweight clothes  · Keep track of how often you urinate  If you urinate less than usual or your urine is darker, drink more liquids  When should I seek immediate care? · You have a seizure  · You are confused or cannot think clearly  · You are extremely sleepy, or another person cannot wake you  · You become dizzy or faint when you stand  · You are not able to urinate  · You have trouble breathing  · You have a fast or irregular heartbeat  · Your hands or feet are cold, or your face is pale  When should I contact my healthcare provider? · You have trouble drinking liquids because you are vomiting  · Your symptoms get worse  · You have a fever  · You feel very weak or tired      · You have questions or concerns about your condition or care  CARE AGREEMENT:   You have the right to help plan your care  Learn about your health condition and how it may be treated  Discuss treatment options with your caregivers to decide what care you want to receive  You always have the right to refuse treatment  The above information is an  only  It is not intended as medical advice for individual conditions or treatments  Talk to your doctor, nurse or pharmacist before following any medical regimen to see if it is safe and effective for you  © 2017 [employer]  Information is for End User's use only and may not be sold, redistributed or otherwise used for commercial purposes  All illustrations and images included in CareNotes® are the copyrighted property of [employer]  or Jon Fili  Fever in Adults   WHAT YOU NEED TO KNOW:   What is a fever? A fever is an increase in your body temperature  Normal body temperature is 98 6°F (37°C)  Fever is generally defined as greater than 100 4°F (38°C)  What are common causes of a fever? The cause of your fever may not be known  This is called fever of unknown origin  It occurs when you have a fever above 100  9? F (38 3°C) for 3 weeks or more  The following are common causes of fever:  · An infection caused by a virus or bacteria    · An inflammatory disorder, such as arthritis    · A brain infection or injury    · Alcohol or illegal drug use, or withdrawal  What other signs and symptoms may I have? · Chills and shivers     · Muscle stiffness    · Weight loss    · Night sweats    · Fever that comes and goes  · Fever that is higher in the morning  How is the cause of a fever diagnosed? Your healthcare provider will ask when your fever began and how high it was  He or she will ask about other symptoms and examine you for signs of infection  He or she will feel your neck for lumps and listen to your heart and lungs   Tell your provider if you recently had surgery or an infection  Tell him or her if you have any medical conditions, such as diabetes or arthritis  You may also need blood or urine tests to check for infection  Ask about other tests you may need if blood and urine tests do not explain the cause of your fever  How is a fever treated? You may need any of the following, depending on the cause of your fever:  · NSAIDs , such as ibuprofen, help decrease swelling, pain, and fever  This medicine is available with or without a doctor's order  NSAIDs can cause stomach bleeding or kidney problems in certain people  If you take blood thinner medicine, always ask if NSAIDs are safe for you  Always read the medicine label and follow directions  Do not give these medicines to children under 10months of age without direction from your child's healthcare provider  · Acetaminophen  decreases pain and fever  It is available without a doctor's order  Ask how much to take and how often to take it  Follow directions  Read the labels of all other medicines you are using to see if they also contain acetaminophen, or ask your doctor or pharmacist  Acetaminophen can cause liver damage if not taken correctly  Do not use more than 4 grams (4,000 milligrams) total of acetaminophen in one day  · Antibiotics  may be given if you have an infection caused by bacteria  What can I do to be more comfortable while I have a fever? · Drink more liquids as directed  A fever makes you sweat  This can increase your risk for dehydration  Liquids can help prevent dehydration  ¨ Drink at least 6 to 8 eight-ounce cups of clear liquids each day  Drink water, juice, or broth  Do not drink sports drinks  They may contain caffeine  ¨ Ask your healthcare provider if you should drink an oral rehydration solution (ORS)  An ORS has the right amounts of water, salts, and sugar you need to replace body fluids  · Dress in lightweight clothes    Shivers may be a sign that your fever is rising  Do not put extra blankets or clothes on  This may cause your fever to rise even higher  Dress in light, comfortable clothing  Use a lightweight blanket or sheet when you sleep  Change your clothes, blanket, or sheets if they get wet  · Cool yourself safely  Take a bath in cool or lukewarm water  Use an ice pack wrapped in a small towel or wet a washcloth with cool water  Place the ice pack or wet washcloth on your forehead or the back of your neck  When should I seek immediate care? · Your fever does not go away or gets worse even after treatment  · You have a stiff neck and a bad headache  · You are confused  You may not be able to think clearly or remember things like you normally do  · Your heart beats faster than usual even after treatment  · You have shortness of breath or chest pain when you breathe  · You urinate small amounts or not at all  · Your skin, lips, or nails turn blue  When should I contact my healthcare provider? · You have abdominal pain or feel bloated  · You have nausea or are vomiting  · You have pain or burning when you urinate, or you have pain in your back  · You have questions or concerns about your condition or care  CARE AGREEMENT:   You have the right to help plan your care  Learn about your health condition and how it may be treated  Discuss treatment options with your caregivers to decide what care you want to receive  You always have the right to refuse treatment  The above information is an  only  It is not intended as medical advice for individual conditions or treatments  Talk to your doctor, nurse or pharmacist before following any medical regimen to see if it is safe and effective for you  © 2017 [employer] Information is for End User's use only and may not be sold, redistributed or otherwise used for commercial purposes   All illustrations and images included in CareNotes® are the copyrighted property of [employer]  or Jon Penn  Urinary Tract Infection in Women   WHAT YOU NEED TO KNOW:   What is a urinary tract infection (UTI)? A UTI is caused by bacteria that get inside your urinary tract  Your urinary tract includes your kidneys, ureters, bladder, and urethra  Urine is made in your kidneys, and it flows from the ureters to the bladder  Urine leaves the bladder through the urethra  A UTI is more common in your lower urinary tract, which includes your bladder and urethra  What increases my risk for a UTI? · A urinary catheter or self-catheterization    · Pregnancy    · Urinary tract problems, such as a narrowing, kidney stones, or inability to empty your bladder completely    · History of a UTI    · Sexual intercourse    · Menopause    · Diabetes or obesity  What are the signs and symptoms of a UTI? · Urinating more often than usual, leaking urine, or waking from sleep to urinate    · Pain or burning when you urinate    · Pain or pressure in your lower abdomen     · Urine that smells bad    · Blood in your urine  How is a UTI diagnosed? Your healthcare provider will ask about your signs and symptoms  Your provider may press on your abdomen, sides, and back to check if you feel pain  Your urine will be tested for bacteria that may be causing your infection  If you have UTIs often, you may need more tests to find the cause  How is a UTI treated? · Antibiotics  help fight a bacterial infection  · Medicines  may be given to decrease pain and burning when you urinate  They will also help decrease the feeling that you need to urinate often  These medicines will make your urine orange or red  What can I do to prevent a UTI? · Empty your bladder often  Urinate and empty your bladder as soon as you feel the need  Do not hold your urine for long periods of time  · Wipe from front to back after you urinate or have a bowel movement    This will help prevent germs from getting into your urinary tract through your urethra  · Drink liquids as directed  Ask how much liquid to drink each day and which liquids are best for you  You may need to drink more liquids than usual to help flush out the bacteria  Do not drink alcohol, caffeine, or citrus juices  These can irritate your bladder and increase your symptoms  Your healthcare provider may recommend cranberry juice to help prevent a UTI  · Urinate after you have sex  This can help flush out bacteria passed during sex  · Do not douche or use feminine deodorants  These can change the chemical balance in your vagina  · Change sanitary pads or tampons often  This will help prevent germs from getting into your urinary tract  · Do pelvic muscle exercises often  Pelvic muscle exercises may help you start and stop urinating  Strong pelvic muscles may help you empty your bladder easier  Squeeze these muscles tightly for 5 seconds like you are trying to hold back urine  Then relax for 5 seconds  Gradually work up to squeezing for 10 seconds  Do 3 sets of 15 repetitions a day, or as directed  When should I seek immediate care? · You are urinating very little or not at all  · You have a high fever with shaking chills  · You have side or back pain that gets worse  When should I contact my healthcare provider? · You have a mild fever  · You do not feel better after 2 days of taking antibiotics  · You have new symptoms, such as blood or pus in your urine  · You are vomiting  · You have questions or concerns about your condition or care  CARE AGREEMENT:   You have the right to help plan your care  Learn about your health condition and how it may be treated  Discuss treatment options with your caregivers to decide what care you want to receive  You always have the right to refuse treatment  The above information is an  only   It is not intended as medical advice for individual conditions or treatments  Talk to your doctor, nurse or pharmacist before following any medical regimen to see if it is safe and effective for you  © 2017 [employer] Information is for End User's use only and may not be sold, redistributed or otherwise used for commercial purposes  All illustrations and images included in CareNotes® are the copyrighted property of [employer]  or Jon Penn

## 2018-02-24 NOTE — ED ATTENDING ATTESTATION
Trenton King MD, saw and evaluated the patient  I have discussed the patient with the resident/non-physician practitioner and agree with the resident's/non-physician practitioner's findings, Plan of Care, and MDM as documented in the resident's/non-physician practitioner's note, except where noted  All available labs and Radiology studies were reviewed  At this point I agree with the current assessment done in the Emergency Department  I have conducted an independent evaluation of this patient a history and physical is as follows:       Critical Care Time  CritCare Time    Procedures 2 days post general anesthesia for left ureteroscopy, stone removal, and placement of stent  Was put on Ceftin  Patient began with fever yesterday and called Urology  Was told to take extra dose of Ceftin which she did  Today awoke with fever of 101 again  Patient had been told to come to the hospital if the fever persists  Patient feels weak and a little shaky but better with lying down  Patient has very minimal left flank tenderness    Patient is noted acute distress with some left flank tenderness

## 2018-02-24 NOTE — ED PROVIDER NOTES
History  Chief Complaint   Patient presents with    Fever - 9 weeks to 74 years     Pt states developed fever yesterday max  temp 101, pt s/p stent placement Thursday afternoon     HPI  39 yo female presenting for evaluation of fever  Patient is 2 days post op from left ureteroscopy with stent placement and stone removal   This was done by Dr Rubén Kaur  Patient is currently on Ceftin and took an extra dose yesterday per urology phone call  Fever started yesterday in the evening  Patient was feeling shaky all day before  Patient had a fever of 101 at home yesterday  This morning, the temperature was 101 4  Patient does get belly spasms since the placement but only last a few seconds, but no abdominal pain  Patient did have some nausea earlier, no vomiting  When patient gets up, she does feel ill  No chest pain, no SOB, does have a HA that started last night, worse when sitting up  No history of headaches  No dysuria, but does have hematuria, which is expected  No UR symptoms except some congestion  She did have general anesthesia and was intubated  Did have one watery stool yesterday  Last took tylenol at midnight last night  No dizziness, did have lightheadedness when showering last night  PMH: kidney stones  PSH: prior ureteral stent, D and C, C section, tonsillectomy  SH:Denies smoking, drinking and drug use      Prior to Admission Medications   Prescriptions Last Dose Informant Patient Reported? Taking?    Potassium Citrate ER 15 MEQ (1620 MG) TBCR 2/24/2018 at Unknown time  Yes Yes   Sig: Take 1 tablet by mouth 2 (two) times a day   cefuroxime (CEFTIN) 500 mg tablet 2/24/2018 at Unknown time  No Yes   Sig: Take 1 tablet (500 mg total) by mouth every 12 (twelve) hours for 3 days   oxyCODONE-acetaminophen (PERCOCET) 5-325 mg per tablet 2/24/2018 at Unknown time  No Yes   Sig: Take 1 tablet by mouth every 4 (four) hours as needed for moderate pain for up to 5 days Max Daily Amount: 6 tablets Facility-Administered Medications: None       Past Medical History:   Diagnosis Date    Calculus of kidney     DJD (degenerative joint disease)     Eczema     Hydronephrosis     Nephrolithiasis        Past Surgical History:   Procedure Laterality Date     SECTION      X 2    CYSTOSCOPY      DILATION AND CURETTAGE OF UTERUS      [place] CYSTO/URETERO W/LITHOTRIPSY &INDWELL STENT INSRT Right 2016    Procedure: CYSTOSCOPY; URETEROSCOPY; HOLMIUM LASER LITHOTRIPSY; BASKET STONE EXTRACTION; RETROGRADE PYELOGRAM; STENT PLACEMENT ;  Surgeon: Masood Muhammad MD;  Location:  MAIN OR;  Service: Urology    [place] CYSTO/URETERO W/LITHOTRIPSY &INDWELL STENT INSRT Left 2018    Procedure: CYSTOSCOPY, LEFT URETEROSCOPY AND RENAL ENDOSCOPY WITH LITHOTRIPSY OF RENAL CALCULUS WITH HOLMIUM LASER,  BILATERAL RETROGRADE PYELOGRAM AND INSERTION OF LEFT URETERAL STENT;  Surgeon: Shanika Marrufo MD;  Location:  MAIN OR;  Service: Urology    TONSILLECTOMY      TUBAL LIGATION      WISDOM TOOTH EXTRACTION         Family History   Problem Relation Age of Onset    Diabetes Mother     Heart attack Father     Prostate cancer Father     Heart disease Father     Hyperlipidemia Father     Hypertension Father     Multiple sclerosis Sister      I have reviewed and agree with the history as documented  Social History   Substance Use Topics    Smoking status: Never Smoker    Smokeless tobacco: Never Used    Alcohol use No        Review of Systems    Constitutional: Positive for for appetite change, chills and fever  HENT: Negative for , rhinorrhea and sore throat  Positive for congestion  Eyes: Negative for photophobia, pain and visual disturbance  Respiratory: Negative for cough, chest tightness and shortness of breath  Cardiovascular: Negative for chest pain, palpitations and leg swelling  Gastrointestinal: Negative for abdominal pain, diarrhea, nausea and vomiting     Genitourinary: Negative for dysuria, flank pain and hematuria  Musculoskeletal: Negative for back pain, neck pain and neck stiffness  Skin: Negative for color change, rash and wound  Neurological: Negative for dizziness, numbness and headaches  All other systems reviewed and are negative      Physical Exam  ED Triage Vitals [02/24/18 0904]   Temperature Pulse Respirations Blood Pressure SpO2   100 °F (37 8 °C) (!) 128 18 112/68 100 %      Temp Source Heart Rate Source Patient Position - Orthostatic VS BP Location FiO2 (%)   Tympanic Monitor Sitting Left arm --      Pain Score       3           Orthostatic Vital Signs  Vitals:    02/24/18 1130 02/24/18 1200 02/24/18 1230 02/24/18 1300   BP: 111/57 106/52 114/61 118/67   Pulse: (!) 110 (!) 112 102 104   Patient Position - Orthostatic VS: Lying Sitting Lying Lying       Physical Exam  /67 (BP Location: Right arm)   Pulse 104   Temp 99 °F (37 2 °C) (Oral)   Resp 18   Ht 5' 2" (1 575 m)   Wt 93 kg (205 lb)   SpO2 95%   BMI 37 49 kg/m²     General Appearance:  Alert, cooperative, no distress   Head:  Normocephalic, without obvious abnormality, atraumatic   Eyes:  PERRL, conjunctiva/corneas clear, EOM's intact       Nose: Nares normal, septum midline,mucosa normal, no drainage or sinus tenderness   Throat: Lips, mucosa, and tongue normal; teeth and gums normal   Neck: Supple, symmetrical, trachea midline, no adenopathy   Back:   Symmetric, no curvature, ROM normal, no CVA tenderness   Lungs:   Clear to auscultation bilaterally, respirations unlabored   Heart:  Regular rate and rhythm, S1 and S2 normal, no murmur, rub, or gallop   Abdomen:   Soft, mild left flank tenderness without rebound or guarding, bowel sounds active all four quadrants  Bedside ultrasound shows no hydronephrosis of the left kidney, there is mild hydronephrosis on the right side which the patient says is chronic   Pelvic: Deferred   Extremities: Extremities normal, atraumatic, no cyanosis or edema   Pulses: 2+ and symmetric   Skin: Skin color, texture, turgor normal, no rashes or lesions   Neurologic:      Psychiatric: Moves all extremities, sensation and strength in tact in all extremities    Normal mood and affect       ED Medications  Medications   sodium chloride 0 9 % bolus 1,000 mL (0 mL Intravenous Stopped 2/24/18 1126)   acetaminophen (TYLENOL) tablet 975 mg (975 mg Oral Given 2/24/18 1027)   sodium chloride 0 9 % bolus 1,000 mL (0 mL Intravenous Stopped 2/24/18 1303)       Diagnostic Studies  Results Reviewed     Procedure Component Value Units Date/Time    Lactic acid, plasma [[id number]]  (Normal) Collected:  02/24/18 1159    Lab Status:  Final result Specimen:  Blood from Arm, Left Updated:  02/24/18 1233     LACTIC ACID 0 9 mmol/L     Narrative:         Result may be elevated if tourniquet was used during collection  Comprehensive metabolic panel [[id number]]  (Abnormal) Collected:  02/24/18 1027    Lab Status:  Final result Specimen:  Blood from Arm, Left Updated:  02/24/18 1059     Sodium 136 mmol/L      Potassium 3 4 (L) mmol/L      Chloride 102 mmol/L      CO2 26 mmol/L      Anion Gap 8 mmol/L      BUN 17 mg/dL      Creatinine 1 46 (H) mg/dL      Glucose 108 mg/dL      Calcium 8 4 mg/dL      AST 10 U/L      ALT 15 U/L      Alkaline Phosphatase 63 U/L      Total Protein 7 0 g/dL      Albumin 3 0 (L) g/dL      Total Bilirubin 0 49 mg/dL      eGFR 45 ml/min/1 73sq m     Narrative:         National Kidney Disease Education Program recommendations are as follows:  GFR calculation is accurate only with a steady state creatinine  Chronic Kidney disease less than 60 ml/min/1 73 sq  meters  Kidney failure less than 15 ml/min/1 73 sq  meters      CBC and differential [[id number]]  (Abnormal) Collected:  02/24/18 1027    Lab Status:  Final result Specimen:  Blood from Arm, Left Updated:  02/24/18 1043     WBC 13 53 (H) Thousand/uL      RBC 4 39 Million/uL      Hemoglobin 11 3 (L) g/dL      Hematocrit 35 1 %      MCV 80 (L) fL      MCH 25 7 (L) pg      MCHC 32 2 g/dL      RDW 15 8 (H) %      MPV 10 3 fL      Platelets 186 Thousands/uL      nRBC 0 /100 WBCs      Neutrophils Relative 87 (H) %      Lymphocytes Relative 4 (L) %      Monocytes Relative 8 %      Eosinophils Relative 1 %      Basophils Relative 0 %      Neutrophils Absolute 11 73 (H) Thousands/µL      Lymphocytes Absolute 0 54 (L) Thousands/µL      Monocytes Absolute 1 12 Thousand/µL      Eosinophils Absolute 0 07 Thousand/µL      Basophils Absolute 0 03 Thousands/µL     POCT urinalysis dipstick [[id number]]  (Normal) Resulted:  02/24/18 1012    Lab Status:  Final result Specimen:  Urine Updated:  02/24/18 1012     Color, UA done    Urine Microscopic [[id number]]  (Abnormal) Collected:  02/24/18 0946    Lab Status:  Final result Specimen:  Urine from Urine, Clean Catch Updated:  02/24/18 1012     RBC, UA Innumerable (A) /hpf      WBC, UA Innumerable (A) /hpf      Epithelial Cells Occasional /hpf      Bacteria, UA Occasional /hpf     Urine culture [[id number]] Collected:  02/24/18 0946    Lab Status: In process Specimen:  Urine from Urine, Clean Catch Updated:  02/24/18 1012    ED Urine Macroscopic [[id number]]  (Abnormal) Collected:  02/24/18 0946    Lab Status:  Final result Specimen:  Urine Updated:  02/24/18 0940     Color, UA Red     Clarity, UA Turbid     pH, UA 6 0     Leukocytes, UA Large (A)     Nitrite, UA Negative     Protein, UA >=300 (A) mg/dl      Glucose, UA Negative mg/dl      Ketones, UA 15 (1+) (A) mg/dl      Urobilinogen, UA 0 2 E U /dl      Bilirubin, UA Interference- unable to analyze (A)     Blood, UA Large (A)     Specific [place], UA 1 020    Narrative:       CLINITEK RESULT                 CT abdomen pelvis wo contrast   Final Result by Homero Chavarria MD (02/24 1309)      Left renal swelling and enlargement with moderate perinephric fluid stranding presumed to represent urine extravasation    New left double-J ureteral stent present without significant hydronephrosis  Multiple small nonobstructing left renal calculi as    detailed above  Mild fullness of the right renal collecting system, significantly improved when compared to prior CT and essentially stable compared to recent ultrasound, likely residual from prior insult  Bilateral spondylolysis and grade 2  spondylolisthesis at L5-S1 with chronic changes at the disc space and endplates  Workstation performed: [id number]               Procedures  Procedures      Phone Consults  ED Phone Contact    ED Course  ED Course as of Feb 24 1400   Sat Feb 24, 2018   1135 Paged Urology    [phone] with Urology, recommended non con CT scan of the abdomen pelvis evaluate pyelo and placement of the stent  Patient also has Candida growing in the urine culture that was drawn from the procedure  If patient is feeling better, she can be discharged with Bactrim and Diflucan  However, there is any concerning finding on the CT scan, patient should be admitted to Medicine with Urology consult  0283 Leonel Miranda with Dr Alok Moore, he reviewed the images  There appear exactly how you would expect after procedure  He is okay with patient going home if patient is stable and feeling better with 1 week of Bactrim and Diflucan  [phone] Patient states she feels much better  HR in the low 100s  Will DC home with bactrim and Diflucan, patient ok with this  Gave strict return precautions  MDM   The patient was postoperative fever, may be due to atelectasis, possible UTI, possible viral syndrome  Will get UA to evaluate for UTI, will check CBC and CMP, will give IV fluid bolus and Tylenol  If workup is negative, will consult urology about antibiotic extension      CritCare Time    Disposition  Final diagnoses:   Dehydration   Fever   UTI (urinary tract infection)   Candidal urinary tract infection     Time reflects when diagnosis was documented in both MDM as applicable and the Disposition within this note Time User Action Codes Description Comment    2/24/2018  1:58 PM Víctor Rubins Add [E86 0] Dehydration     2/24/2018  1:58 PM Víctor Rubins Add [R50 9] Fever     2/24/2018  1:58 PM Víctor Rubins Add [N39 0] UTI (urinary tract infection)     2/24/2018  1:58 PM Víctor Rubins Add [B37 49] Candidal urinary tract infection       ED Disposition     ED Disposition Condition Comment    Discharge  Josué Grayomuceno discharge to home/self care  Condition at discharge: Stable        Follow-up Information     Follow up With Specialties Details Why Contact Info Additional Information    Jerome Be MD Urology Go in 2 days  [address] Emergency Department Emergency Medicine  If symptoms worsen [address]  [phone]  ED, [address] [place] [address], [address]        Patient's Medications   Discharge Prescriptions    FLUCONAZOLE (DIFLUCAN) 200 MG TABLET    Take 1 tablet (200 mg total) by mouth daily for 7 days       Start Date: 2/24/2018 End Date: 3/3/2018       Order Dose: 200 mg       Quantity: 7 tablet    Refills: 0    SULFAMETHOXAZOLE-TRIMETHOPRIM (BACTRIM DS) 800-160 MG PER TABLET    Take 1 tablet by mouth 2 (two) times a day for 7 days smx-tmp DS (BACTRIM) 800-160 mg tabs (1tab q12 D10)       Start Date: 2/24/2018 End Date: 3/3/2018       Order Dose: 1 tablet       Quantity: 14 tablet    Refills: 0     No discharge procedures on file  ED Provider  Attending physically available and evaluated Josué Bardales Ramiro GUERRERO managed the patient along with the ED Attending      Electronically Signed by         Kin Baker MD  02/24/18 1400

## 2018-02-25 LAB — BACTERIA UR CULT: NORMAL

## 2018-02-27 ENCOUNTER — TELEPHONE (OUTPATIENT)
Dept: UROLOGY | Facility: CLINIC | Age: 43
End: 2018-02-27

## 2018-02-27 NOTE — TELEPHONE ENCOUNTER
This patient had a cysto renal endoscopy with laser lithotripsy on feb 22  She went to the ER over the weekend with fever  Her urine culture was negative  ( albicans [id number])  Ct showed mild fluid extravisation without hydronephrosis  Jc Turner She was given Fluconazole and sent home  Cassandra fever of 101F persists  She has no pain nausea or vomiting  I offered he an appointment  She will call later with an update

## 2018-02-28 ENCOUNTER — TELEPHONE (OUTPATIENT)
Dept: UROLOGY | Facility: CLINIC | Age: 43
End: 2018-02-28

## 2018-03-14 ENCOUNTER — TELEPHONE (OUTPATIENT)
Dept: UROLOGY | Facility: CLINIC | Age: 43
End: 2018-03-14

## 2018-03-14 NOTE — TELEPHONE ENCOUNTER
I called pt  for the 3rd time to try and get her scheduled for a 3wk f/u appt  There was no answer again so i did leave another mesg asking her to call our office back to get this appt scheduled  Pt  Does have stents in and needs to have them removed

## 2018-05-21 ENCOUNTER — OFFICE VISIT (OUTPATIENT)
Dept: UROLOGY | Facility: AMBULATORY SURGERY CENTER | Age: 43
End: 2018-05-21
Payer: COMMERCIAL

## 2018-05-21 VITALS
WEIGHT: 208.8 LBS | SYSTOLIC BLOOD PRESSURE: 128 MMHG | HEIGHT: 62 IN | DIASTOLIC BLOOD PRESSURE: 80 MMHG | BODY MASS INDEX: 38.42 KG/M2 | HEART RATE: 70 BPM

## 2018-05-21 DIAGNOSIS — N20.0 KIDNEY STONE: Primary | ICD-10-CM

## 2018-05-21 PROCEDURE — 99213 OFFICE O/P EST LOW 20 MIN: CPT | Performed by: UROLOGY

## 2018-05-21 NOTE — PROGRESS NOTES
Assessment/Plan:    Calculus of kidney  I reviewed the CT scan with her from February  There was no evidence of stone on the right side at that point  We discussed that her pain and symptoms may be musculoskeletal   I will order a renal ultrasound to re-evaluate  She will just take 30 mEq of her potassium citrate in the morning since she forgets for afternoon dose  She will follow up in 1 year  Diagnoses and all orders for this visit:    Kidney stone  -     US retroperitoneal complete; Future          Total visit time was 15 minutes of which over 50% was spent on counseling  Subjective:     Patient ID: Radha Rubio is a 41 y o  female    29-year-old female presents for follow-up of nephrolithiasis  Recently she has been having intermittent pain on her right side  It is worse when she lays on that side  She denies any fevers, chills, nausea, vomiting, or gross hematuria  She states that she forgets to take her afternoon dose of potassium citrate  She has no other complaints  The following portions of the patient's history were reviewed and updated as appropriate: allergies, current medications, past family history, past medical history, past social history, past surgical history and problem list     Review of Systems   Constitutional: Negative  HENT: Negative  Eyes: Negative  Respiratory: Negative  Cardiovascular: Negative  Gastrointestinal: Negative  Endocrine: Negative  Genitourinary:        As noted per HPI   Musculoskeletal: Negative  Skin: Negative  Allergic/Immunologic: Negative  Neurological: Negative  Hematological: Negative  Psychiatric/Behavioral: Negative  Urinary Incontinence Screening      Most Recent Value   Urinary Incontinence   Urinary Incontinence? No   Incomplete emptying? No   Urinary frequency? No   Urinary urgency? No   Urinary hesitancy? No   Dysuria (painful difficult urination)?   No   Nocturia (waking up to use the bathroom)? Yes [1 time]   Straining (having to push to go)? No   Weak stream?  No   Intermittent stream?  No   Post void dribbling? No   Vaginal pressure? No   Vaginal dryness? No          Objective:    Physical Exam   Constitutional: She is oriented to person, place, and time  She appears well-developed and well-nourished  HENT:   Head: Normocephalic and atraumatic  Neck: Normal range of motion  Neck supple  Cardiovascular: Intact distal pulses  Pulmonary/Chest: Effort normal    Abdominal: Soft  Bowel sounds are normal  She exhibits no distension and no mass  There is no tenderness  There is no rebound and no guarding  Musculoskeletal: Normal range of motion  Neurological: She is alert and oriented to person, place, and time  Skin: Skin is warm and dry  Psychiatric: She has a normal mood and affect  Vitals reviewed          Results  No results found for: PSA  Lab Results   Component Value Date    GLUCOSE 108 02/24/2018    CALCIUM 8 4 02/24/2018     02/24/2018    K 3 4 (L) 02/24/2018    CO2 26 02/24/2018     02/24/2018    BUN 17 02/24/2018    CREATININE 1 46 (H) 02/24/2018     Lab Results   Component Value Date    WBC 13 53 (H) 02/24/2018    HGB 11 3 (L) 02/24/2018    HCT 35 1 02/24/2018    MCV 80 (L) 02/24/2018     02/24/2018       No results found for this or any previous visit (from the past 1 hour(s)) ]

## 2018-05-21 NOTE — ASSESSMENT & PLAN NOTE
I reviewed the CT scan with her from February  There was no evidence of stone on the right side at that point  We discussed that her pain and symptoms may be musculoskeletal   I will order a renal ultrasound to re-evaluate  She will just take 30 mEq of her potassium citrate in the morning since she forgets for afternoon dose  She will follow up in 1 year

## 2018-05-22 ENCOUNTER — HOSPITAL ENCOUNTER (OUTPATIENT)
Dept: RADIOLOGY | Age: 43
Discharge: HOME | End: 2018-05-22
Payer: COMMERCIAL

## 2018-05-22 DIAGNOSIS — N20.0 KIDNEY STONE: ICD-10-CM

## 2018-05-22 PROCEDURE — 76770 US EXAM ABDO BACK WALL COMP: CPT

## 2018-07-31 ENCOUNTER — TELEPHONE (OUTPATIENT)
Dept: FAMILY MEDICINE CLINIC | Facility: CLINIC | Age: 43
End: 2018-07-31

## 2018-07-31 NOTE — TELEPHONE ENCOUNTER
Patient wanted to come in for her [facility] wellness but she also wanted che to look at and hopefully remove a mole at that visit  If they can't be done together patient wants to only been seen for the mole if Felipe Flores is pretty sure she can remove it at the visit  How do you want to do this?

## 2018-08-07 ENCOUNTER — APPOINTMENT (OUTPATIENT)
Dept: LAB | Facility: CLINIC | Age: 43
End: 2018-08-07
Payer: COMMERCIAL

## 2018-08-07 ENCOUNTER — OFFICE VISIT (OUTPATIENT)
Dept: FAMILY MEDICINE CLINIC | Facility: CLINIC | Age: 43
End: 2018-08-07
Payer: COMMERCIAL

## 2018-08-07 VITALS
DIASTOLIC BLOOD PRESSURE: 78 MMHG | SYSTOLIC BLOOD PRESSURE: 118 MMHG | HEART RATE: 70 BPM | WEIGHT: 216 LBS | RESPIRATION RATE: 16 BRPM | BODY MASS INDEX: 39.75 KG/M2 | HEIGHT: 62 IN

## 2018-08-07 DIAGNOSIS — Z13.1 SCREENING FOR DIABETES MELLITUS: Primary | ICD-10-CM

## 2018-08-07 DIAGNOSIS — Z13.220 SCREENING, LIPID: ICD-10-CM

## 2018-08-07 DIAGNOSIS — Z13.1 SCREENING FOR DIABETES MELLITUS: ICD-10-CM

## 2018-08-07 DIAGNOSIS — D23.9 DYSPLASTIC NEVUS: ICD-10-CM

## 2018-08-07 PROBLEM — E78.5 DYSLIPIDEMIA: Status: ACTIVE | Noted: 2017-06-21

## 2018-08-07 PROBLEM — R73.09 ELEVATED GLUCOSE LEVEL: Status: ACTIVE | Noted: 2017-06-21

## 2018-08-07 LAB
CHOLEST SERPL-MCNC: 147 MG/DL (ref 50–200)
EST. AVERAGE GLUCOSE BLD GHB EST-MCNC: 128 MG/DL
HBA1C MFR BLD: 6.1 % (ref 4.2–6.3)
HDLC SERPL-MCNC: 42 MG/DL (ref 40–60)
LDLC SERPL CALC-MCNC: 90 MG/DL (ref 0–100)
TRIGL SERPL-MCNC: 76 MG/DL

## 2018-08-07 PROCEDURE — 1036F TOBACCO NON-USER: CPT | Performed by: PHYSICIAN ASSISTANT

## 2018-08-07 PROCEDURE — 36415 COLL VENOUS BLD VENIPUNCTURE: CPT

## 2018-08-07 PROCEDURE — 83036 HEMOGLOBIN GLYCOSYLATED A1C: CPT

## 2018-08-07 PROCEDURE — 11301 SHAVE SKIN LESION 0.6-1.0 CM: CPT | Performed by: PHYSICIAN ASSISTANT

## 2018-08-07 PROCEDURE — 88305 TISSUE EXAM BY PATHOLOGIST: CPT | Performed by: PATHOLOGY

## 2018-08-07 PROCEDURE — 3008F BODY MASS INDEX DOCD: CPT | Performed by: PHYSICIAN ASSISTANT

## 2018-08-07 PROCEDURE — 80061 LIPID PANEL: CPT

## 2018-08-07 PROCEDURE — 99213 OFFICE O/P EST LOW 20 MIN: CPT | Performed by: PHYSICIAN ASSISTANT

## 2018-08-07 NOTE — PROGRESS NOTES
Assessment/Plan:    1  Dysplastic nevus    - removal and sent to pathology, reassurance appears benign in nature, seborrheic keratosis  - Tissue Exam    2  Screening for diabetes mellitus    - Lipid Panel with Direct LDL reflex; Future    3  Screening, lipid    - HEMOGLOBIN A1C W/ EAG ESTIMATION; Future    PAP - under care Dr Anais Santos due 2019    F/u as needed  F/u pending labs      Subjective:   Chief Complaint   Patient presents with    Abrasion     check mole       Patient ID: Radha Abrams is a 41 y o  female  Patient here for removal of a lesion on patients upper left leg that in patiens opinion has gotten darker and larger over the past few months  Requesting removal     Also here for labs to be done for [facility]  Tried to lead a healthy lifestyle  Doing exercises for back which helps with DDD  Seeing Nano Bryant and Jesenia for kidney stones and hydronephrosis           The following portions of the patient's history were reviewed and updated as appropriate: allergies, current medications, past family history, past medical history, past social history, past surgical history and problem list     Past Medical History:   Diagnosis Date    Calculus of kidney     DJD (degenerative joint disease)     Eczema     Hydronephrosis     Hydronephrosis     last assessed 17 documented resolved 17    Kidney stones     Nephrolithiasis      Past Surgical History:   Procedure Laterality Date     SECTION      X 2  and      CYSTOSCOPY      DILATION AND CURETTAGE OF UTERUS      [place] CYSTO/URETERO W/LITHOTRIPSY &INDWELL STENT INSRT Right 2016    Procedure: CYSTOSCOPY; URETEROSCOPY; HOLMIUM LASER LITHOTRIPSY; BASKET STONE EXTRACTION; RETROGRADE PYELOGRAM; STENT PLACEMENT ;  Surgeon: Brianna Nunes MD;  Location:  MAIN OR;  Service: Urology    [place] CYSTO/URETERO W/LITHOTRIPSY &INDWELL STENT INSRT Left 2018    Procedure: CYSTOSCOPY, LEFT URETEROSCOPY AND RENAL ENDOSCOPY WITH LITHOTRIPSY OF RENAL CALCULUS WITH HOLMIUM LASER,  BILATERAL RETROGRADE PYELOGRAM AND INSERTION OF LEFT URETERAL STENT;  Surgeon: Chad Huber MD;  Location: [facility];  Service: Urology    TONSILLECTOMY      TUBAL LIGATION      WISDOM TOOTH EXTRACTION       Family History   Problem Relation Age of Onset    Diabetes Mother     Heart attack Father     Prostate cancer Father     Heart disease Father         myocardial infarction    Hyperlipidemia Father     Hypertension Father     Coronary artery disease Father     Cancer Father         malignant neoplasm of urinary bladder    Multiple sclerosis Sister     COPD Maternal Grandmother     Liver cancer Paternal Grandmother     Pancreatic cancer Paternal Grandmother     Diabetes Paternal Grandmother     Stomach cancer Paternal Grandfather      Social History     Social History    Marital status: /Civil Union     Spouse name: N/A    Number of children: N/A    Years of education: N/A     Occupational History    Not on file  Social History Main Topics    Smoking status: Never Smoker    Smokeless tobacco: Never Used    Alcohol use No    Drug use: No    Sexual activity: Not on file     Other Topics Concern    Not on file     Social History Narrative    No narrative on file       Current Outpatient Prescriptions:     Potassium Citrate ER 15 MEQ (1620 MG) TBCR, Take 1 tablet by mouth 2 (two) times a day, Disp: , Rfl:     Review of Systems          Objective:    Vitals:    08/07/18 0912   BP: 118/78   BP Location: Left arm   Patient Position: Sitting   Pulse: 70   Resp: 16   Weight: 98 kg (216 lb)   Height: 5' 2" (1 575 m)        Physical Exam   Constitutional: She is oriented to person, place, and time  She appears well-developed and well-nourished  Cardiovascular: Normal rate, regular rhythm and normal heart sounds      Pulmonary/Chest: Effort normal and breath sounds normal    Neurological: She is alert and oriented to person, place, and time  Skin:   Left upper inner thigh with 8 mm papular irregular edges, brown leathery looking lesion   Psychiatric: She has a normal mood and affect  Judgment normal            Shave lesion  Date/Time: 8/7/2018 10:01 AM  Performed by: Angela Laguna by: Wander Organ     Number of Lesions: 1  Lesion 1:     Body area: lower extremity    Lower extremity location: L upper leg    Initial size (mm): 8    Final defect size (mm): 10    Malignancy: benign lesion      Comments:  Area cleaned with rubbing alcohol, area injected with 1 cc xylocaine without epi, shaved without complication, few bleeders cauterized without complication  Bacitracin and bandaid applied  Advised to keep dry and clean for 24 hours, then keep covered with vaseline and bandaid for 1 week  Consent obtained  Specimen sent to pathology

## 2018-09-13 DIAGNOSIS — Z12.31 ENCOUNTER FOR SCREENING MAMMOGRAM FOR MALIGNANT NEOPLASM OF BREAST: ICD-10-CM

## 2019-01-04 ENCOUNTER — TELEPHONE (OUTPATIENT)
Dept: NEPHROLOGY | Facility: CLINIC | Age: 44
End: 2019-01-04

## 2019-01-04 NOTE — TELEPHONE ENCOUNTER
Nando called and stated that she stopped potassium citrate due to upset GI (diarrhea, GERD) approx  2 months ago  She has an upcoming appointment with you on 1/15/19 and is wondering if you would still like for her to do the 24hr urine? Please let me know either way so that I can make her aware prior to coming in  Thanks!

## 2019-01-08 NOTE — TELEPHONE ENCOUNTER
I called and spoke with Dannie Harris  She's aware that she should still obtain the 24 hr urine and will do so prior to her visit

## 2019-01-12 ENCOUNTER — APPOINTMENT (OUTPATIENT)
Dept: LAB | Facility: HOSPITAL | Age: 44
End: 2019-01-12
Attending: INTERNAL MEDICINE
Payer: COMMERCIAL

## 2019-01-12 DIAGNOSIS — N20.0 CALCULUS OF KIDNEY: ICD-10-CM

## 2019-01-12 LAB
ALBUMIN SERPL BCP-MCNC: 3.4 G/DL (ref 3.5–5)
ALP SERPL-CCNC: 84 U/L (ref 46–116)
ALT SERPL W P-5'-P-CCNC: 22 U/L (ref 12–78)
ANION GAP SERPL CALCULATED.3IONS-SCNC: 5 MMOL/L (ref 4–13)
AST SERPL W P-5'-P-CCNC: 14 U/L (ref 5–45)
BACTERIA UR QL AUTO: ABNORMAL /HPF
BILIRUB SERPL-MCNC: 0.32 MG/DL (ref 0.2–1)
BILIRUB UR QL STRIP: NEGATIVE
BUN SERPL-MCNC: 17 MG/DL (ref 5–25)
CALCIUM SERPL-MCNC: 8.8 MG/DL (ref 8.3–10.1)
CHLORIDE SERPL-SCNC: 102 MMOL/L (ref 100–108)
CLARITY UR: ABNORMAL
CO2 SERPL-SCNC: 27 MMOL/L (ref 21–32)
COLOR UR: YELLOW
CREAT SERPL-MCNC: 0.92 MG/DL (ref 0.6–1.3)
ERYTHROCYTE [DISTWIDTH] IN BLOOD BY AUTOMATED COUNT: 14.6 % (ref 11.6–15.1)
GFR SERPL CREATININE-BSD FRML MDRD: 78 ML/MIN/1.73SQ M
GLUCOSE P FAST SERPL-MCNC: 117 MG/DL (ref 65–99)
GLUCOSE UR STRIP-MCNC: NEGATIVE MG/DL
HCT VFR BLD AUTO: 38.9 % (ref 34.8–46.1)
HGB BLD-MCNC: 12.3 G/DL (ref 11.5–15.4)
HGB UR QL STRIP.AUTO: ABNORMAL
HYALINE CASTS #/AREA URNS LPF: ABNORMAL /LPF
KETONES UR STRIP-MCNC: NEGATIVE MG/DL
LEUKOCYTE ESTERASE UR QL STRIP: ABNORMAL
MAGNESIUM SERPL-MCNC: 1.9 MG/DL (ref 1.6–2.6)
MCH RBC QN AUTO: 26.2 PG (ref 26.8–34.3)
MCHC RBC AUTO-ENTMCNC: 31.6 G/DL (ref 31.4–37.4)
MCV RBC AUTO: 83 FL (ref 82–98)
NITRITE UR QL STRIP: NEGATIVE
NON-SQ EPI CELLS URNS QL MICRO: ABNORMAL /HPF
PH UR STRIP.AUTO: 6 [PH] (ref 4.5–8)
PHOSPHATE SERPL-MCNC: 3.7 MG/DL (ref 2.7–4.5)
PLATELET # BLD AUTO: 310 THOUSANDS/UL (ref 149–390)
PMV BLD AUTO: 10.7 FL (ref 8.9–12.7)
POTASSIUM SERPL-SCNC: 4.1 MMOL/L (ref 3.5–5.3)
PROT SERPL-MCNC: 7.4 G/DL (ref 6.4–8.2)
PROT UR STRIP-MCNC: NEGATIVE MG/DL
RBC # BLD AUTO: 4.69 MILLION/UL (ref 3.81–5.12)
RBC #/AREA URNS AUTO: ABNORMAL /HPF
SODIUM SERPL-SCNC: 134 MMOL/L (ref 136–145)
SP GR UR STRIP.AUTO: 1.02 (ref 1–1.03)
URATE SERPL-MCNC: 4.2 MG/DL (ref 2–6.8)
UROBILINOGEN UR QL STRIP.AUTO: 0.2 E.U./DL
WBC # BLD AUTO: 9.22 THOUSAND/UL (ref 4.31–10.16)
WBC #/AREA URNS AUTO: ABNORMAL /HPF

## 2019-01-12 PROCEDURE — 85027 COMPLETE CBC AUTOMATED: CPT

## 2019-01-12 PROCEDURE — 84100 ASSAY OF PHOSPHORUS: CPT

## 2019-01-12 PROCEDURE — 84550 ASSAY OF BLOOD/URIC ACID: CPT

## 2019-01-12 PROCEDURE — 81001 URINALYSIS AUTO W/SCOPE: CPT

## 2019-01-12 PROCEDURE — 80053 COMPREHEN METABOLIC PANEL: CPT

## 2019-01-12 PROCEDURE — 36415 COLL VENOUS BLD VENIPUNCTURE: CPT

## 2019-01-12 PROCEDURE — 83735 ASSAY OF MAGNESIUM: CPT

## 2019-01-14 ENCOUNTER — APPOINTMENT (OUTPATIENT)
Dept: LAB | Facility: HOSPITAL | Age: 44
End: 2019-01-14
Attending: INTERNAL MEDICINE
Payer: COMMERCIAL

## 2019-01-14 DIAGNOSIS — N20.0 CALCULUS OF KIDNEY: ICD-10-CM

## 2019-01-14 PROCEDURE — 81003 URINALYSIS AUTO W/O SCOPE: CPT

## 2019-01-14 PROCEDURE — 82140 ASSAY OF AMMONIA: CPT

## 2019-01-14 PROCEDURE — 82507 ASSAY OF CITRATE: CPT

## 2019-01-14 PROCEDURE — 84392 ASSAY OF URINE SULFATE: CPT

## 2019-01-14 PROCEDURE — 84105 ASSAY OF URINE PHOSPHORUS: CPT

## 2019-01-14 PROCEDURE — 84560 ASSAY OF URINE/URIC ACID: CPT

## 2019-01-14 PROCEDURE — 82570 ASSAY OF URINE CREATININE: CPT

## 2019-01-14 PROCEDURE — 83735 ASSAY OF MAGNESIUM: CPT

## 2019-01-14 PROCEDURE — 84300 ASSAY OF URINE SODIUM: CPT

## 2019-01-14 PROCEDURE — 84133 ASSAY OF URINE POTASSIUM: CPT

## 2019-01-14 PROCEDURE — 83935 ASSAY OF URINE OSMOLALITY: CPT

## 2019-01-14 PROCEDURE — 82340 ASSAY OF CALCIUM IN URINE: CPT

## 2019-01-14 PROCEDURE — 82436 ASSAY OF URINE CHLORIDE: CPT

## 2019-01-14 PROCEDURE — 83945 ASSAY OF OXALATE: CPT

## 2019-01-14 PROCEDURE — 82131 AMINO ACIDS SINGLE QUANT: CPT

## 2019-01-15 ENCOUNTER — OFFICE VISIT (OUTPATIENT)
Dept: NEPHROLOGY | Facility: CLINIC | Age: 44
End: 2019-01-15
Payer: COMMERCIAL

## 2019-01-15 VITALS
BODY MASS INDEX: 41.7 KG/M2 | SYSTOLIC BLOOD PRESSURE: 110 MMHG | HEART RATE: 94 BPM | WEIGHT: 226.6 LBS | DIASTOLIC BLOOD PRESSURE: 76 MMHG | HEIGHT: 62 IN

## 2019-01-15 DIAGNOSIS — N20.0 NEPHROLITHIASIS: Primary | ICD-10-CM

## 2019-01-15 DIAGNOSIS — E83.42 HYPOMAGNESEMIA: ICD-10-CM

## 2019-01-15 DIAGNOSIS — N13.2 HYDRONEPHROSIS WITH RENAL AND URETERAL CALCULUS OBSTRUCTION: ICD-10-CM

## 2019-01-15 DIAGNOSIS — K21.9 GERD (GASTROESOPHAGEAL REFLUX DISEASE): ICD-10-CM

## 2019-01-15 DIAGNOSIS — N13.30 HYDRONEPHROSIS OF LEFT KIDNEY: ICD-10-CM

## 2019-01-15 DIAGNOSIS — E87.1 HYPONATREMIA: ICD-10-CM

## 2019-01-15 PROCEDURE — 99214 OFFICE O/P EST MOD 30 MIN: CPT | Performed by: INTERNAL MEDICINE

## 2019-01-15 NOTE — PROGRESS NOTES
OFFICE FOLLOW UP - Nephrology   Daylene Osler Nepomuceno 41 y o  female MRN: [id number]    Encounter: [id number]        ASSESSMENT    59-year-old female with a past medical history the past medical history of nephrolithiasis presents for follow-up evaluation    Nephrolithiasis  Hydronephrosis with renal and ureteral calculus obstruction  Hydronephrosis of left kidney  Hyponatremia  GERD (gastroesophageal reflux disease)  Hypomagnesemia    DISCUSSION & PLAN    1  Nephrolithiasis  -she has not passed a stone recently since her last office visit  -did have hydronephrosis that is resolving and is following up with Urology  -renal function remains stable  -not tolerating potassium citrate well with significant GI side effects and worsening of acid reflux  -24 hour stone risk profile is pending   -will discuss options once results have been reviewed    2  Hydronephrosis with ureteral calculus obstruction  -this seems to be resolving her renal function remains stable she is getting yearly ultrasounds by Urology    3  Hyponatremia  -this was mild and seen on her last set of blood work previously has not had this  -she claims she does drink about 2-2 and 0 5 L a day which may be contributing to a slightly lower sodium  -if sodium is persistently low will consider further workup    4  GERD  -does take Pepcid often on confirm that she does not take Prilosec this seems to be worse when she takes a potassium citrate  -1 stone risk profile resulted will discuss further management and the need for long-term acid reflux treatment    5  Hypomagnesemia  -patient is complaining of increased cramping on the sides and in the back  -magnesium was mildly low below 2 0  -can take it magnesium supplement to see if this helps resolve cramping issues    Will follow up results of 24 hour stone risk profile and make further recommendations otherwise will continue yearly follow-up      HPI: Estevan Watson is a 41 y o  female who is here for follow-up    She has a past medical history that is consistent of nephrolithiasis that is been chronic she has borderline diabetes, good blood pressure control over the last year she has not been too compliant with her potassium citrate secondary to significant GI side effects of diarrhea and abdominal distention and discomfort take Pepcid when this occurs but does not seem to alleviate her symptoms so she is currently not taking a she denies any gross hematuria denies any chest pain and any recent passing of stone  She has no edema in her lower extremities in otherwise continues to feel okay she had a 24 hour stone risk profile that was done which is have not been resulted yet      ROS:   All the systems were reviewed and were negative except as documented on the HPI  Allergies: Patient has no known allergies      Medications:   Current Outpatient Prescriptions:     Potassium Citrate ER 15 MEQ (1620 MG) TBCR, Take 1 tablet by mouth 2 (two) times a day, Disp: , Rfl:     magnesium oxide (MAG-OX) 400 mg, Take 1 tablet (400 mg total) by mouth daily, Disp: 90 tablet, Rfl: 3    Past Medical History:   Diagnosis Date    Calculus of kidney     DJD (degenerative joint disease)     Eczema     Hydronephrosis     Hydronephrosis     last assessed 17 documented resolved 17    Kidney stones     Nephrolithiasis      Past Surgical History:   Procedure Laterality Date     SECTION      X 2  and      CYSTOSCOPY      DILATION AND CURETTAGE OF UTERUS      OK CYSTO/URETERO W/LITHOTRIPSY &INDWELL STENT INSRT Right 2016    Procedure: CYSTOSCOPY; URETEROSCOPY; HOLMIUM LASER LITHOTRIPSY; BASKET STONE EXTRACTION; RETROGRADE PYELOGRAM; STENT PLACEMENT ;  Surgeon: Yue Portillo MD;  [facility];  Service: Urology    OK CYSTO/URETERO W/LITHOTRIPSY &INDWELL STENT INSRT Left 2018    Procedure: CYSTOSCOPY, LEFT URETEROSCOPY AND RENAL ENDOSCOPY WITH LITHOTRIPSY OF RENAL CALCULUS WITH HOLMIUM LASER,  BILATERAL RETROGRADE PYELOGRAM AND INSERTION OF LEFT URETERAL STENT;  Surgeon: Danielle Joyce MD;  [facility];  Service: Urology    TONSILLECTOMY      TUBAL LIGATION      WISDOM TOOTH EXTRACTION       Family History   Problem Relation Age of Onset    Diabetes Mother     Heart attack Father     Prostate cancer Father     Heart disease Father         myocardial infarction    Hyperlipidemia Father     Hypertension Father     Coronary artery disease Father     Cancer Father         malignant neoplasm of urinary bladder    Multiple sclerosis Sister     COPD Maternal Grandmother     Liver cancer Paternal Grandmother     Pancreatic cancer Paternal Grandmother     Diabetes Paternal Grandmother     Stomach cancer Paternal Grandfather       reports that she has never smoked  She has never used smokeless tobacco  She reports that she does not drink alcohol or use drugs  Physical Exam:   Vitals:    01/15/19 0922 01/15/19 0926 01/15/19 0928 01/15/19 0929   BP:  110/71 111/72 110/76   BP Location:  Right arm Right arm Right arm   Patient Position:  Sitting Sitting Sitting   Cuff Size:  Large Large Large   Pulse:  90 92 94   Weight: 103 kg (226 lb 9 6 oz)      Height: 5' 2" (1 575 m)        Body mass index is 41 45 kg/m²      General: conscious, cooperative, in not acute distress  Eyes: conjunctivae pink, anicteric sclerae  ENT: lips and mucous membranes moist  Neck: supple, no JVD  Chest: clear breath sounds bilateral, no crackles, ronchus or wheezings  CVS: distinct S1 & S2, normal rate, regular rhythm  Abdomen: soft, non-tender, non-distended, normoactive bowel sounds  Extremities: no edema of both legs  Skin: no rash  Neuro: awake, alert, oriented      Lab Results:    Results from last 7 days  Lab Units 01/12/19  0916   WBC Thousand/uL 9 22   HEMOGLOBIN g/dL 12 3   HEMATOCRIT % 38 9   PLATELETS Thousands/uL 310   POTASSIUM mmol/L 4 1   CHLORIDE mmol/L 102   CO2 mmol/L 27   BUN mg/dL 17   CREATININE mg/dL 0 92   CALCIUM mg/dL 8 8   MAGNESIUM mg/dL 1 9   PHOSPHORUS mg/dL 3 7       Portions of the record may have been created with voice recognition software  Occasional wrong word or "sound a like" substitutions may have occurred due to the inherent limitations of voice recognition software  Read the chart carefully and recognize, using context, where substitutions have occurred  If you have any questions, please contact the dictating provider

## 2019-01-15 NOTE — PATIENT INSTRUCTIONS
Kidney Stones   AMBULATORY CARE:   Kidney stones  form in the urinary system when the water and waste in your urine are out of balance  When this happens, certain types of waste crystals separate from the urine  The crystals build up and form kidney stones  Kidney stones can be made of uric acid, calcium, phosphate, or oxalate crystals  You may have 1 or more kidney stones  Common symptoms include the following:   · Pain in the middle of your back that moves across to your side or that may spread to your groin    · Nausea and vomiting    · Urge to urinate often, burning feeling when you urinate, or pink or red urine    · Tenderness in your lower back, side, or stomach  Seek care immediately if:   · You have vomiting that is not relieved by medicine  Contact your healthcare provider if:   · You have a fever  · You have trouble passing urine  · You see blood in your urine  · You have severe pain  · You have any questions or concerns about your condition or care  Treatment for kidney stones  may include any of the following:  · NSAIDs , such as ibuprofen, help decrease swelling, pain, and fever  This medicine is available with or without a doctor's order  NSAIDs can cause stomach bleeding or kidney problems in certain people  If you take blood thinner medicine, always ask your healthcare provider if NSAIDs are safe for you  Always read the medicine label and follow directions  · Prescription medicine  may be given  Ask how to take this medicine safely  · Medicines  to balance your electrolytes may be needed  · A procedure or surgery  to remove the kidney stones may be needed if they do not pass on their own  Your treatment will depend on the size and location of your kidney stones  Manage your symptoms:   · Drink plenty of liquids  Your healthcare provider may tell you to drink at least 8 to 12 (eight-ounce) cups of liquids each day   This helps flush out the kidney stones when you urinate  Water is the best liquid to drink  · Strain your urine every time you go to the bathroom  Urinate through a strainer or a piece of thin cloth to catch the stones  Take the stones to your healthcare provider so they can be sent to the lab for tests  This will help your healthcare providers plan the best treatment for you  · Eat a variety of healthy foods  Healthy foods include fruits, vegetables, whole-grain breads, low-fat dairy products, beans, and fish  You may need to limit how much sodium (salt) or protein you eat  Ask for information about the best foods for you  · Exercise regularly  Your stones may pass more easily if you stay active  Ask about the best activities for you  After you pass your kidney stones:  Once you have passed your kidney stones, your healthcare provider may  order a 24-hour urine test  Results from a 24-hour urine test will help your healthcare provider plan ways to prevent more stones from forming  If you are told to do a 24-hour test, your healthcare provider will give you more instructions  Follow up with your healthcare provider as directed:  Write down your questions so you remember to ask them during your visits  © 2017 [facility] Information is for End User's use only and may not be sold, redistributed or otherwise used for commercial purposes  All illustrations and images included in CareNotes® are the copyrighted property of [employer]  or [employer]  The above information is an  only  It is not intended as medical advice for individual conditions or treatments  Talk to your doctor, nurse or pharmacist before following any medical regimen to see if it is safe and effective for you

## 2019-01-15 NOTE — LETTER
January 15, 2019     Miguel Plunkett, [address] [facility]    Patient: Nitin Rubio   YOB: 1975   Date of Visit: 1/15/2019       Dear Dr Mckeon Drop: Thank you for referring Rebecca Jameel Rubio to me for evaluation  Below are my notes for this consultation  If you have questions, please do not hesitate to call me  I look forward to following your patient along with you           Sincerely,        Jude Arreola,         CC: No Recipients

## 2019-01-15 NOTE — LETTER
January 15, 2019     Eren Cid MD  [address] [id number]    Patient: Janet Rubio   YOB: 1975   Date of Visit: 1/15/2019       Dear Dr Keon Pacheco:    Thank you for referring Rancho Rubio to me for evaluation  Below are my notes for this consultation  If you have questions, please do not hesitate to call me  I look forward to following your patient along with you           Sincerely,        Corin Rosas,         CC: No Recipients

## 2019-01-19 LAB
AMMONIA 24H UR-MRATE: 42 MEQ/24 HR
AMMONIA UR-SCNC: ABNORMAL UG/DL
CA H2 PHOS DIHYD CRY URNS QL MICRO: 1.37 RATIO (ref 0–3)
CALCIUM 24H UR-MCNC: 15.4 MG/DL
CALCIUM 24H UR-MRATE: 246.4 MG/24 HR (ref 100–300)
CHLORIDE 24H UR-SCNC: 82 MMOL/L
CHLORIDE 24H UR-SRATE: 131 MMOL/24 HR (ref 110–250)
CITRATE 24H UR-MCNC: 241 MG/L
CITRATE 24H UR-MRATE: 386 MG/24 HR (ref 320–1240)
COM CRY STONE QL IR: 7 RATIO (ref 0–6)
CREAT 24H UR-MCNC: 87.4 MG/DL
CREAT 24H UR-MRATE: 1398.4 MG/24 HR (ref 800–1800)
CYSTINE 24H UR-MCNC: 5.46 MG/L
CYSTINE 24H UR-MRATE: 8.74 MG/24 HR (ref 10–100)
MAGNESIUM 24H UR-MRATE: 70 MG/24 HR (ref 12–293)
MAGNESIUM UR-MCNC: 4.4 MG/DL
NA URATE CRY STONE QL IR: 4.27 RATIO (ref 0–4)
OSMOLALITY UR: 575 MOSMOL/KG (ref 300–900)
OXALATE 24H UR-MRATE: 27 MG/24 HR (ref 4–31)
OXALATE UR-MCNC: 17 MG/L
PH 24H UR: 5.8 [PH]
PHOSPHATE 24H UR-MCNC: 56.8 MG/DL
PHOSPHATE 24H UR-MRATE: 908.8 MG/24 HR (ref 400–1300)
PLEASE NOTE (STONE RISK): ABNORMAL
POTASSIUM 24H UR-SCNC: 50.2 MMOL/24 HR (ref 25–125)
POTASSIUM UR-SCNC: 31.4 MMOL/L
PRESERVED URINE: 1600 ML/24 HR (ref 600–1600)
SODIUM 24H UR-SCNC: 89 MMOL/L
SODIUM 24H UR-SRATE: 142 MMOL/24 HR (ref 39–258)
SPECIMEN VOL 24H UR: 1600 ML/24 HR (ref 600–1600)
SULFATE 24H UR-MCNC: 34 MEQ/24 HR (ref 0–30)
SULFATE UR-MCNC: 21 MEQ/L
TRI-PHOS CRY STONE MICRO: 0.01 RATIO (ref 0–1)
URATE 24H UR-MCNC: 51.9 MG/DL
URATE 24H UR-MRATE: 830 MG/24 HR (ref 250–750)
URATE DIHYD CRY STONE QL IR: 3.04 RATIO (ref 0–1.2)

## 2019-01-22 ENCOUNTER — TELEPHONE (OUTPATIENT)
Dept: OTHER | Facility: HOSPITAL | Age: 44
End: 2019-01-22

## 2019-01-22 DIAGNOSIS — N20.0 NEPHROLITHIASIS: Primary | ICD-10-CM

## 2019-01-22 RX ORDER — CHLORTHALIDONE 25 MG/1
12.5 TABLET ORAL DAILY
Qty: 15 TABLET | Refills: 3 | Status: SHIPPED | OUTPATIENT
Start: 2019-01-22 | End: 2019-03-13 | Stop reason: SDUPTHER

## 2019-01-22 NOTE — TELEPHONE ENCOUNTER
Discussed results of 24 hr urine collection with patient, high super saturation of calcium oxalate, low urine volume, patient has been intolerant of potassium citrate will attempt using chlorthalidone 12 5 mg daily to promote hypocalcicuria, repeat a BMP in 1-2 weeks to make sure electrolytes are stable and monitor blood pressure while on medication will repeat a 24 hr stone risk profile in 4-6 months

## 2019-02-01 DIAGNOSIS — N20.0 CALCULUS OF KIDNEY: ICD-10-CM

## 2019-02-05 ENCOUNTER — LAB (OUTPATIENT)
Dept: LAB | Facility: HOSPITAL | Age: 44
End: 2019-02-05
Attending: INTERNAL MEDICINE
Payer: COMMERCIAL

## 2019-02-05 DIAGNOSIS — N20.0 NEPHROLITHIASIS: ICD-10-CM

## 2019-02-05 LAB
ANION GAP SERPL CALCULATED.3IONS-SCNC: 4 MMOL/L (ref 4–13)
BUN SERPL-MCNC: 18 MG/DL (ref 5–25)
CALCIUM SERPL-MCNC: 8.7 MG/DL (ref 8.3–10.1)
CHLORIDE SERPL-SCNC: 103 MMOL/L (ref 100–108)
CO2 SERPL-SCNC: 28 MMOL/L (ref 21–32)
CREAT SERPL-MCNC: 0.9 MG/DL (ref 0.6–1.3)
GFR SERPL CREATININE-BSD FRML MDRD: 80 ML/MIN/1.73SQ M
GLUCOSE P FAST SERPL-MCNC: 105 MG/DL (ref 65–99)
POTASSIUM SERPL-SCNC: 3.6 MMOL/L (ref 3.5–5.3)
SODIUM SERPL-SCNC: 135 MMOL/L (ref 136–145)

## 2019-02-05 PROCEDURE — 36415 COLL VENOUS BLD VENIPUNCTURE: CPT

## 2019-02-05 PROCEDURE — 80048 BASIC METABOLIC PNL TOTAL CA: CPT

## 2019-02-05 NOTE — PROGRESS NOTES
Please let her know that her labs were reviewed her sodium was 135 potassium was 3 6  This could be related to the chlorthalidone that was initiated  I do not think she is taking the potassium citrate any more because of GI upset  Can you confirm the  If she is not taking in please also start her on additional potassium chloride 20 mEq daily  Have her repeat a BMP in 1 month if her electrolytes are stable we can continue the medication

## 2019-02-06 ENCOUNTER — TELEPHONE (OUTPATIENT)
Dept: NEPHROLOGY | Facility: HOSPITAL | Age: 44
End: 2019-02-06

## 2019-02-06 DIAGNOSIS — E87.1 HYPONATREMIA: ICD-10-CM

## 2019-02-06 DIAGNOSIS — N20.0 NEPHROLITHIASIS: Primary | ICD-10-CM

## 2019-02-06 NOTE — TELEPHONE ENCOUNTER
I called and spoke with Natalie Goldsmith  She's aware of her blood work results  She confirmed that she is no longer taking the potassium citrate and will try the potassium chloride  She did have some additional questions regarding potassium chloride - Dr Nicole King spoke with her on the phone and clarified any concerns she had  She is also agreeable to repeat a BMP in one month to reassess electrolytes  BMP, mag & A1c order has been placed and mailed to her

## 2019-02-11 RX ORDER — POTASSIUM CHLORIDE 20 MEQ/1
20 TABLET, EXTENDED RELEASE ORAL DAILY
Qty: 30 TABLET | Refills: 5 | Status: SHIPPED | OUTPATIENT
Start: 2019-02-11 | End: 2019-03-13 | Stop reason: SDUPTHER

## 2019-02-28 ENCOUNTER — DOCUMENTATION (OUTPATIENT)
Dept: FAMILY MEDICINE CLINIC | Facility: HOSPITAL | Age: 44
End: 2019-02-28

## 2019-02-28 DIAGNOSIS — J01.00 ACUTE NON-RECURRENT MAXILLARY SINUSITIS: Primary | ICD-10-CM

## 2019-02-28 RX ORDER — AMOXICILLIN AND CLAVULANATE POTASSIUM 875; 125 MG/1; MG/1
1 TABLET, FILM COATED ORAL EVERY 12 HOURS SCHEDULED
Qty: 14 TABLET | Refills: 0 | Status: SHIPPED | OUTPATIENT
Start: 2019-02-28 | End: 2019-03-07

## 2019-02-28 RX ORDER — AMOXICILLIN AND CLAVULANATE POTASSIUM 875; 125 MG/1; MG/1
1 TABLET, FILM COATED ORAL EVERY 12 HOURS SCHEDULED
Qty: 14 TABLET | Refills: 0 | Status: SHIPPED | OUTPATIENT
Start: 2019-02-28 | End: 2019-02-28 | Stop reason: SDUPTHER

## 2019-03-12 ENCOUNTER — TELEPHONE (OUTPATIENT)
Dept: NEPHROLOGY | Facility: CLINIC | Age: 44
End: 2019-03-12

## 2019-03-12 ENCOUNTER — APPOINTMENT (OUTPATIENT)
Dept: LAB | Facility: HOSPITAL | Age: 44
End: 2019-03-12
Attending: INTERNAL MEDICINE
Payer: COMMERCIAL

## 2019-03-12 DIAGNOSIS — N20.0 NEPHROLITHIASIS: ICD-10-CM

## 2019-03-12 DIAGNOSIS — E87.1 HYPONATREMIA: ICD-10-CM

## 2019-03-12 DIAGNOSIS — E83.42 HYPOMAGNESEMIA: ICD-10-CM

## 2019-03-12 LAB
ANION GAP SERPL CALCULATED.3IONS-SCNC: 5 MMOL/L (ref 4–13)
BUN SERPL-MCNC: 16 MG/DL (ref 5–25)
CALCIUM SERPL-MCNC: 8.5 MG/DL (ref 8.3–10.1)
CHLORIDE SERPL-SCNC: 105 MMOL/L (ref 100–108)
CO2 SERPL-SCNC: 29 MMOL/L (ref 21–32)
CREAT SERPL-MCNC: 0.88 MG/DL (ref 0.6–1.3)
EST. AVERAGE GLUCOSE BLD GHB EST-MCNC: 140 MG/DL
GFR SERPL CREATININE-BSD FRML MDRD: 82 ML/MIN/1.73SQ M
GLUCOSE P FAST SERPL-MCNC: 105 MG/DL (ref 65–99)
HBA1C MFR BLD: 6.5 % (ref 4.2–6.3)
MAGNESIUM SERPL-MCNC: 2.1 MG/DL (ref 1.6–2.6)
POTASSIUM SERPL-SCNC: 3.6 MMOL/L (ref 3.5–5.3)
SODIUM SERPL-SCNC: 139 MMOL/L (ref 136–145)

## 2019-03-12 PROCEDURE — 80048 BASIC METABOLIC PNL TOTAL CA: CPT

## 2019-03-12 PROCEDURE — 83036 HEMOGLOBIN GLYCOSYLATED A1C: CPT

## 2019-03-12 PROCEDURE — 83735 ASSAY OF MAGNESIUM: CPT

## 2019-03-12 PROCEDURE — 36415 COLL VENOUS BLD VENIPUNCTURE: CPT

## 2019-03-12 NOTE — TELEPHONE ENCOUNTER
----- Message from Haydee Norris DO sent at 3/12/2019  3:26 PM EDT -----  Please let her know that labs were reviewed sodium better at 139, K stable at 3 6, If she is tolerating medications we can continue with no changes  Thanks

## 2019-03-13 RX ORDER — CHLORTHALIDONE 25 MG/1
12.5 TABLET ORAL DAILY
Qty: 45 TABLET | Refills: 3 | Status: SHIPPED | OUTPATIENT
Start: 2019-03-13 | End: 2019-11-18 | Stop reason: SDUPTHER

## 2019-03-13 RX ORDER — POTASSIUM CHLORIDE 20 MEQ/1
20 TABLET, EXTENDED RELEASE ORAL DAILY
Qty: 90 TABLET | Refills: 3 | Status: SHIPPED | OUTPATIENT
Start: 2019-03-13 | End: 2020-05-26

## 2019-03-13 NOTE — TELEPHONE ENCOUNTER
Called and notified patient that her medications have been refilled for 90 days and they were sent to [facility]

## 2019-03-13 NOTE — TELEPHONE ENCOUNTER
Patient returned your voicemail from yesterday  I read patient the results from Dr Caprice Escobedo    Patient understood  She is asking that her medications be filled for 90 days rather than 30 day supply  Also, she is requesting if the Potassium could be refilled and sent to [facility]  Patient does not have any further questions or concerns at this time

## 2019-04-30 ENCOUNTER — OFFICE VISIT (OUTPATIENT)
Dept: FAMILY MEDICINE CLINIC | Facility: CLINIC | Age: 44
End: 2019-04-30
Payer: COMMERCIAL

## 2019-04-30 VITALS
RESPIRATION RATE: 14 BRPM | HEIGHT: 62 IN | BODY MASS INDEX: 40.5 KG/M2 | WEIGHT: 220.1 LBS | DIASTOLIC BLOOD PRESSURE: 82 MMHG | SYSTOLIC BLOOD PRESSURE: 120 MMHG | HEART RATE: 76 BPM

## 2019-04-30 DIAGNOSIS — E66.01 MORBID OBESITY WITH BMI OF 40.0-44.9, ADULT (HCC): ICD-10-CM

## 2019-04-30 DIAGNOSIS — R10.31 RLQ ABDOMINAL PAIN: Primary | ICD-10-CM

## 2019-04-30 PROCEDURE — 99214 OFFICE O/P EST MOD 30 MIN: CPT | Performed by: PHYSICIAN ASSISTANT

## 2019-05-01 ENCOUNTER — HOSPITAL ENCOUNTER (OUTPATIENT)
Dept: CT IMAGING | Facility: HOSPITAL | Age: 44
Discharge: HOME | End: 2019-05-01
Payer: COMMERCIAL

## 2019-05-01 ENCOUNTER — TELEPHONE (OUTPATIENT)
Dept: UROLOGY | Facility: AMBULATORY SURGERY CENTER | Age: 44
End: 2019-05-01

## 2019-05-01 DIAGNOSIS — R10.31 RLQ ABDOMINAL PAIN: ICD-10-CM

## 2019-05-01 PROCEDURE — 74177 CT ABD & PELVIS W/CONTRAST: CPT

## 2019-05-01 RX ADMIN — IOHEXOL 100 ML: 350 INJECTION, SOLUTION INTRAVENOUS at 11:45

## 2019-05-01 RX ADMIN — IOHEXOL 50 ML: 240 INJECTION, SOLUTION INTRATHECAL; INTRAVASCULAR; INTRAVENOUS; ORAL at 10:00

## 2019-05-02 ENCOUNTER — OFFICE VISIT (OUTPATIENT)
Dept: UROLOGY | Facility: AMBULATORY SURGERY CENTER | Age: 44
End: 2019-05-02
Payer: COMMERCIAL

## 2019-05-02 ENCOUNTER — TELEPHONE (OUTPATIENT)
Dept: FAMILY MEDICINE CLINIC | Facility: CLINIC | Age: 44
End: 2019-05-02

## 2019-05-02 VITALS
HEIGHT: 62 IN | DIASTOLIC BLOOD PRESSURE: 80 MMHG | BODY MASS INDEX: 40.48 KG/M2 | SYSTOLIC BLOOD PRESSURE: 128 MMHG | WEIGHT: 220 LBS | HEART RATE: 84 BPM

## 2019-05-02 DIAGNOSIS — N20.0 NEPHROLITHIASIS: Primary | ICD-10-CM

## 2019-05-02 DIAGNOSIS — N13.30 HYDRONEPHROSIS, UNSPECIFIED HYDRONEPHROSIS TYPE: ICD-10-CM

## 2019-05-02 PROCEDURE — 99214 OFFICE O/P EST MOD 30 MIN: CPT | Performed by: NURSE PRACTITIONER

## 2019-05-03 DIAGNOSIS — N13.30 HYDRONEPHROSIS, UNSPECIFIED HYDRONEPHROSIS TYPE: Primary | ICD-10-CM

## 2019-05-13 ENCOUNTER — HOSPITAL ENCOUNTER (OUTPATIENT)
Dept: NUCLEAR MEDICINE | Facility: HOSPITAL | Age: 44
Discharge: HOME | End: 2019-05-13
Attending: UROLOGY
Payer: COMMERCIAL

## 2019-05-13 DIAGNOSIS — N13.30 HYDRONEPHROSIS, UNSPECIFIED HYDRONEPHROSIS TYPE: ICD-10-CM

## 2019-05-13 PROCEDURE — 78707 K FLOW/FUNCT IMAGE W/O DRUG: CPT

## 2019-05-13 PROCEDURE — A9562 TC99M MERTIATIDE: HCPCS

## 2019-05-13 RX ORDER — FUROSEMIDE 10 MG/ML
40 INJECTION INTRAMUSCULAR; INTRAVENOUS ONCE
Status: COMPLETED | OUTPATIENT
Start: 2019-05-13 | End: 2019-05-13

## 2019-05-13 RX ADMIN — FUROSEMIDE 40 MG: 10 INJECTION, SOLUTION INTRAMUSCULAR; INTRAVENOUS at 10:40

## 2019-10-19 DIAGNOSIS — J20.9 ACUTE BRONCHITIS, UNSPECIFIED ORGANISM: Primary | ICD-10-CM

## 2019-10-19 RX ORDER — AZITHROMYCIN 250 MG/1
TABLET, FILM COATED ORAL
Qty: 6 TABLET | Refills: 0 | Status: SHIPPED | OUTPATIENT
Start: 2019-10-19 | End: 2019-10-24

## 2019-10-19 RX ORDER — AZITHROMYCIN 250 MG/1
TABLET, FILM COATED ORAL
Qty: 6 TABLET | Refills: 0 | Status: SHIPPED | OUTPATIENT
Start: 2019-10-19 | End: 2019-10-19 | Stop reason: SDUPTHER

## 2019-11-18 DIAGNOSIS — N20.0 NEPHROLITHIASIS: ICD-10-CM

## 2019-11-18 RX ORDER — CHLORTHALIDONE 25 MG/1
12.5 TABLET ORAL DAILY
Qty: 45 TABLET | Refills: 3 | Status: SHIPPED | OUTPATIENT
Start: 2019-11-18 | End: 2020-02-11 | Stop reason: SDUPTHER

## 2019-12-11 ENCOUNTER — ANNUAL EXAM (OUTPATIENT)
Dept: OBGYN CLINIC | Facility: CLINIC | Age: 44
End: 2019-12-11
Payer: COMMERCIAL

## 2019-12-11 VITALS
WEIGHT: 225.2 LBS | DIASTOLIC BLOOD PRESSURE: 74 MMHG | BODY MASS INDEX: 41.44 KG/M2 | HEIGHT: 62 IN | SYSTOLIC BLOOD PRESSURE: 130 MMHG

## 2019-12-11 DIAGNOSIS — E58 DIETARY CALCIUM DEFICIENCY: ICD-10-CM

## 2019-12-11 DIAGNOSIS — Z72.3 INADEQUATE EXERCISE: ICD-10-CM

## 2019-12-11 DIAGNOSIS — N92.0 MENORRHAGIA WITH REGULAR CYCLE: ICD-10-CM

## 2019-12-11 DIAGNOSIS — Z12.31 VISIT FOR SCREENING MAMMOGRAM: ICD-10-CM

## 2019-12-11 DIAGNOSIS — Z01.419 ENCOUNTER FOR ANNUAL ROUTINE GYNECOLOGICAL EXAMINATION: Primary | ICD-10-CM

## 2019-12-11 PROCEDURE — 99396 PREV VISIT EST AGE 40-64: CPT | Performed by: OBSTETRICS & GYNECOLOGY

## 2019-12-11 NOTE — PROGRESS NOTES
Pt is a 45 y o  No obstetric history on file  with Patient's last menstrual period was 2019 (exact date)  using BS/BTL for [facility] presents for preventive care  She notes the same partner since her last STI evaluation  In her lifetime she has been involved with >5 partners   Safe sexual practices (monogomy, condoms) are followed consistently  · She does  feel safe in the relationship  She does feel safe in her home  · Her calcium intake encompasses cheese for a total of 0-1 servings daily on average  She does not take additional Vitamin D (MVI or supplement)  · She exercises 1 times per week  · Her menses occur every 28  Days, last 7-8 days and require maxipad every 24 hours x 2 days then regular tampon every 2 hours with pad for back up for 2 days and then maxipad every 24 hours  Menstrual History:  OB History        4    Para   2    Term   2            AB   2    Living   2       SAB   2    TAB        Ectopic        Multiple        Live Births               Obstetric Comments   Menarche: 14    Menses: 28/7-8/maxipad every 24 hours for 2 days, and then regular tampon every 2 hours x 2 days and then maxipad once daily thereafter            Menarche age: 17  Patient's last menstrual period was 2019 (exact date)  ·      · She has never recieved an HPV vaccine and does not desire to begin or continue the HPV vaccination series    · tobacco use : does not use tobacco              · Colonoscopy: not indicated  · Pap: 2017-wnl, HRHPV neg; repeat   · Mammogram: 2017-3D wnl, repeat rx given    Past Medical History:   Diagnosis Date    DJD (degenerative joint disease)     Eczema     History of gestational diabetes     Hydronephrosis     last assessed 17 documented resolved 17    Kidney stones     Menorrhagia with regular cycle 2018    treated with Lysteda    Nephrolithiasis     Obesity     Seasonal allergies     Varicella        Past Surgical History:   Procedure Laterality Date     SECTION      X 2  and      CYSTOSCOPY      DILATION AND CURETTAGE OF UTERUS      x 2 for missed     [place] CYSTO/URETERO W/LITHOTRIPSY &INDWELL STENT INSRT Right 2016    Procedure: CYSTOSCOPY; URETEROSCOPY; HOLMIUM LASER LITHOTRIPSY; BASKET STONE EXTRACTION; RETROGRADE PYELOGRAM; STENT PLACEMENT ;  Surgeon: Marcelo Han MD;  [facility];  Service: Urology    VT CYSTO/URETERO W/LITHOTRIPSY &INDWELL STENT INSRT Left 2018    Procedure: CYSTOSCOPY, LEFT URETEROSCOPY AND RENAL ENDOSCOPY WITH LITHOTRIPSY OF RENAL CALCULUS WITH HOLMIUM LASER,  BILATERAL RETROGRADE PYELOGRAM AND INSERTION OF LEFT URETERAL STENT;  Surgeon: Alin Stack MD;  [facility];  Service: Urology    TONSILLECTOMY  2012    TUBAL LIGATION      at time of     WISDOM TOOTH EXTRACTION         OB History    Para Term  AB Living   4 2 2   2 2   SAB TAB Ectopic Multiple Live Births   2              # Outcome Date GA Lbr Salvatore/2nd Weight Sex Delivery Anes PTL Lv   4 SAB            3 SAB            2 Term            1 Term               Obstetric Comments   Menarche: 14      Menses: 28/7-8/maxipad every 24 hours for 2 days, and then regular tampon every 2 hours x 2 days and then maxipad once daily thereafter            Current Outpatient Medications:     chlorthalidone 25 mg tablet, Take 0 5 tablets (12 5 mg total) by mouth daily, Disp: 45 tablet, Rfl: 3    magnesium oxide (MAG-OX) 400 mg, Take 1 tablet (400 mg total) by mouth daily, Disp: 90 tablet, Rfl: 3    potassium chloride (K-DUR,KLOR-CON) 20 mEq tablet, Take 1 tablet (20 mEq total) by mouth daily, Disp: 90 tablet, Rfl: 3    No Known Allergies    Social History     Socioeconomic History    Marital status: /Civil Union     Spouse name: Gary    Number of children: 2    Years of education: None    Highest education level:  Bachelor's degree (e g , BA, AB, BS) Occupational History    Occupation: RN   Social Needs    Financial resource strain: None    Food insecurity:     Worry: None     Inability: None    Transportation needs:     Medical: None     Non-medical: None   Tobacco Use    Smoking status: Never Smoker    Smokeless tobacco: Never Used   Substance and Sexual Activity    Alcohol use: Yes     Frequency: Monthly or less     Comment: rare    Drug use: No    Sexual activity: Yes     Partners: Male     Birth control/protection: Female Sterilization     Comment: lifetime partners: 6; current partner: 2005   Lifestyle    Physical activity:     Days per week: None     Minutes per session: None    Stress: None   Relationships    Social connections:     Talks on phone: None     Gets together: None     Attends Caodaism service: None     Active member of club or organization: None     Attends meetings of clubs or organizations: None     Relationship status: None    Intimate partner violence:     Fear of current or ex partner: None     Emotionally abused: None     Physically abused: None     Forced sexual activity: None   Other Topics Concern    None   Social History Narrative    Alevism: [employer] blood products        Exercise: once weekly    Calcium: 1 cheese weekly       Family History   Problem Relation Age of Onset    Diabetes Mother     Heart attack Father     Heart disease Father         myocardial infarction    Hyperlipidemia Father     Hypertension Father     Coronary artery disease Father     Cancer Father         malignant neoplasm of urinary bladder    Multiple sclerosis Sister     COPD Maternal Grandmother     Liver cancer Paternal Grandmother     Pancreatic cancer Paternal Grandmother     Diabetes Paternal Grandmother     Stomach cancer Paternal Grandfather     Endometrial cancer Paternal Aunt     Breast cancer Neg Hx     Ovarian cancer Neg Hx     Colon cancer Neg Hx        Blood pressure 130/74, height 5' 2" (1 570 m), weight 102 kg (225 lb 3 2 oz), last menstrual period 12/11/2019, not currently breastfeeding  and Body mass index is 41 19 kg/m²  Physical Exam   Constitutional: She is oriented to person, place, and time  She appears well-developed and well-nourished  Morbidly obese   HENT:   Head: Normocephalic and atraumatic  Eyes: Conjunctivae and EOM are normal    Neck: Normal range of motion  Neck supple  No tracheal deviation present  No thyromegaly present  Cardiovascular: Normal rate, regular rhythm and normal heart sounds  Pulmonary/Chest: Effort normal and breath sounds normal  No stridor  No respiratory distress  She has no wheezes  She has no rales  Abdominal: Soft  Bowel sounds are normal  She exhibits no distension and no mass  There is no tenderness  There is no rebound and no guarding  Musculoskeletal: Normal range of motion  She exhibits no edema or tenderness  Lymphadenopathy:     She has no cervical adenopathy  Neurological: She is alert and oriented to person, place, and time  Skin: Skin is warm  No rash noted  No erythema  Psychiatric: She has a normal mood and affect  Her behavior is normal  Judgment and thought content normal      Breasts: breasts appear normal, no suspicious masses, no skin or nipple changes or axillary nodes, symmetric fibrous changes in both upper outer quadrants  vulva: normal external genitalia for age and no lesions, masses, epithelial changes, or exudate  vagina: color pink and rugae  well formed rugae  cervix: nullip and no lesions   uterus: NSSC, AF, NT, mobile  adnexa: no masses or tenderness      A/P:  Pt is a 45 y o  No obstetric history on file  with      Diagnoses and all orders for this visit:    Encounter for annual routine gynecological examination    Visit for screening mammogram  -     Mammo screening bilateral w 3d & cad; Future    Menorrhagia with regular cycle  -     CBC; Future  -     Prolactin;  Future  -     TSH, 3rd generation with Free T4 reflex; Future  -     US pelvis complete w transvaginal; Future    Dietary calcium deficiency    Inadequate exercise    BMI 40 0-44 9, adult [facility])    Patient advised recommendation of daily dietary calcium of 1000 mg calcium  Patient advised recommendation of exercise 5 times per week for 30 minutes  Patient advised recommendation of BMI to be between 19-25

## 2019-12-30 ENCOUNTER — APPOINTMENT (OUTPATIENT)
Dept: LAB | Facility: HOSPITAL | Age: 44
End: 2019-12-30
Attending: INTERNAL MEDICINE
Payer: COMMERCIAL

## 2019-12-30 ENCOUNTER — TELEPHONE (OUTPATIENT)
Dept: NEPHROLOGY | Facility: CLINIC | Age: 44
End: 2019-12-30

## 2019-12-30 DIAGNOSIS — N20.0 NEPHROLITHIASIS: ICD-10-CM

## 2019-12-30 DIAGNOSIS — N92.0 MENORRHAGIA WITH REGULAR CYCLE: ICD-10-CM

## 2019-12-30 LAB
25(OH)D3 SERPL-MCNC: 14.8 NG/ML (ref 30–100)
ALBUMIN SERPL BCP-MCNC: 3.3 G/DL (ref 3.5–5)
ALP SERPL-CCNC: 67 U/L (ref 46–116)
ALT SERPL W P-5'-P-CCNC: 23 U/L (ref 12–78)
ANION GAP SERPL CALCULATED.3IONS-SCNC: 5 MMOL/L (ref 4–13)
AST SERPL W P-5'-P-CCNC: 13 U/L (ref 5–45)
BACTERIA UR QL AUTO: ABNORMAL /HPF
BILIRUB SERPL-MCNC: 0.34 MG/DL (ref 0.2–1)
BILIRUB UR QL STRIP: NEGATIVE
BUN SERPL-MCNC: 17 MG/DL (ref 5–25)
CALCIUM SERPL-MCNC: 9.2 MG/DL (ref 8.3–10.1)
CHLORIDE SERPL-SCNC: 102 MMOL/L (ref 100–108)
CLARITY UR: ABNORMAL
CO2 SERPL-SCNC: 29 MMOL/L (ref 21–32)
COLOR UR: YELLOW
CREAT SERPL-MCNC: 0.89 MG/DL (ref 0.6–1.3)
CREAT UR-MCNC: 135 MG/DL
ERYTHROCYTE [DISTWIDTH] IN BLOOD BY AUTOMATED COUNT: 14.3 % (ref 11.6–15.1)
GFR SERPL CREATININE-BSD FRML MDRD: 80 ML/MIN/1.73SQ M
GLUCOSE P FAST SERPL-MCNC: 114 MG/DL (ref 65–99)
GLUCOSE UR STRIP-MCNC: NEGATIVE MG/DL
HCT VFR BLD AUTO: 39.7 % (ref 34.8–46.1)
HGB BLD-MCNC: 12.3 G/DL (ref 11.5–15.4)
HGB UR QL STRIP.AUTO: ABNORMAL
KETONES UR STRIP-MCNC: NEGATIVE MG/DL
LEUKOCYTE ESTERASE UR QL STRIP: ABNORMAL
MAGNESIUM SERPL-MCNC: 1.7 MG/DL (ref 1.6–2.6)
MCH RBC QN AUTO: 26.1 PG (ref 26.8–34.3)
MCHC RBC AUTO-ENTMCNC: 31 G/DL (ref 31.4–37.4)
MCV RBC AUTO: 84 FL (ref 82–98)
NITRITE UR QL STRIP: NEGATIVE
NON-SQ EPI CELLS URNS QL MICRO: ABNORMAL /HPF
OTHER STN SPEC: ABNORMAL
PH UR STRIP.AUTO: 6.5 [PH]
PHOSPHATE SERPL-MCNC: 3.5 MG/DL (ref 2.7–4.5)
PLATELET # BLD AUTO: 312 THOUSANDS/UL (ref 149–390)
PMV BLD AUTO: 10.9 FL (ref 8.9–12.7)
POTASSIUM SERPL-SCNC: 3.7 MMOL/L (ref 3.5–5.3)
PROLACTIN SERPL-MCNC: 9 NG/ML
PROT SERPL-MCNC: 7 G/DL (ref 6.4–8.2)
PROT UR STRIP-MCNC: ABNORMAL MG/DL
PROT UR-MCNC: 28 MG/DL
PROT/CREAT UR: 0.21 MG/G{CREAT} (ref 0–0.1)
PTH-INTACT SERPL-MCNC: 41.6 PG/ML (ref 18.4–80.1)
RBC # BLD AUTO: 4.72 MILLION/UL (ref 3.81–5.12)
RBC #/AREA URNS AUTO: ABNORMAL /HPF
SODIUM SERPL-SCNC: 136 MMOL/L (ref 136–145)
SP GR UR STRIP.AUTO: 1.02 (ref 1–1.03)
TSH SERPL DL<=0.05 MIU/L-ACNC: 1.79 UIU/ML (ref 0.36–3.74)
UROBILINOGEN UR QL STRIP.AUTO: 0.2 E.U./DL
WBC # BLD AUTO: 8.62 THOUSAND/UL (ref 4.31–10.16)
WBC #/AREA URNS AUTO: ABNORMAL /HPF

## 2019-12-30 PROCEDURE — 82306 VITAMIN D 25 HYDROXY: CPT | Performed by: INTERNAL MEDICINE

## 2019-12-30 PROCEDURE — 83970 ASSAY OF PARATHORMONE: CPT | Performed by: INTERNAL MEDICINE

## 2019-12-30 PROCEDURE — 83735 ASSAY OF MAGNESIUM: CPT | Performed by: INTERNAL MEDICINE

## 2019-12-30 PROCEDURE — 83945 ASSAY OF OXALATE: CPT | Performed by: INTERNAL MEDICINE

## 2019-12-30 PROCEDURE — 82140 ASSAY OF AMMONIA: CPT | Performed by: INTERNAL MEDICINE

## 2019-12-30 PROCEDURE — 82570 ASSAY OF URINE CREATININE: CPT | Performed by: INTERNAL MEDICINE

## 2019-12-30 PROCEDURE — 84133 ASSAY OF URINE POTASSIUM: CPT | Performed by: INTERNAL MEDICINE

## 2019-12-30 PROCEDURE — 82436 ASSAY OF URINE CHLORIDE: CPT | Performed by: INTERNAL MEDICINE

## 2019-12-30 PROCEDURE — 84392 ASSAY OF URINE SULFATE: CPT | Performed by: INTERNAL MEDICINE

## 2019-12-30 PROCEDURE — 84105 ASSAY OF URINE PHOSPHORUS: CPT | Performed by: INTERNAL MEDICINE

## 2019-12-30 PROCEDURE — 84100 ASSAY OF PHOSPHORUS: CPT | Performed by: INTERNAL MEDICINE

## 2019-12-30 PROCEDURE — 80053 COMPREHEN METABOLIC PANEL: CPT | Performed by: INTERNAL MEDICINE

## 2019-12-30 PROCEDURE — 84156 ASSAY OF PROTEIN URINE: CPT | Performed by: INTERNAL MEDICINE

## 2019-12-30 PROCEDURE — 81001 URINALYSIS AUTO W/SCOPE: CPT | Performed by: INTERNAL MEDICINE

## 2019-12-30 PROCEDURE — 84300 ASSAY OF URINE SODIUM: CPT | Performed by: INTERNAL MEDICINE

## 2019-12-30 PROCEDURE — 84560 ASSAY OF URINE/URIC ACID: CPT | Performed by: INTERNAL MEDICINE

## 2019-12-30 PROCEDURE — 36415 COLL VENOUS BLD VENIPUNCTURE: CPT | Performed by: INTERNAL MEDICINE

## 2019-12-30 PROCEDURE — 81003 URINALYSIS AUTO W/O SCOPE: CPT | Performed by: INTERNAL MEDICINE

## 2019-12-30 PROCEDURE — 82340 ASSAY OF CALCIUM IN URINE: CPT | Performed by: INTERNAL MEDICINE

## 2019-12-30 PROCEDURE — 82131 AMINO ACIDS SINGLE QUANT: CPT | Performed by: INTERNAL MEDICINE

## 2019-12-30 PROCEDURE — 84443 ASSAY THYROID STIM HORMONE: CPT

## 2019-12-30 PROCEDURE — 84146 ASSAY OF PROLACTIN: CPT

## 2019-12-30 PROCEDURE — 85027 COMPLETE CBC AUTOMATED: CPT

## 2019-12-30 PROCEDURE — 83935 ASSAY OF URINE OSMOLALITY: CPT | Performed by: INTERNAL MEDICINE

## 2019-12-30 PROCEDURE — 82507 ASSAY OF CITRATE: CPT | Performed by: INTERNAL MEDICINE

## 2019-12-30 NOTE — TELEPHONE ENCOUNTER
----- Message from Aarti Friedman DO sent at 12/30/2019  3:23 PM EST -----  Labs reviewed, will discuss with her at her follow-up appointment in January, has held slightly low vitamin-D level potassium is stable

## 2020-01-06 LAB
AMMONIA 24H UR-MRATE: 23 MEQ/24 HR
AMMONIA UR-SCNC: ABNORMAL UG/DL
CA H2 PHOS DIHYD CRY URNS QL MICRO: 2.85 RATIO (ref 0–3)
CALCIUM 24H UR-MCNC: 16.1 MG/DL
CALCIUM 24H UR-MRATE: 297.9 MG/24 HR (ref 100–300)
CHLORIDE 24H UR-SCNC: 55 MMOL/L
CHLORIDE 24H UR-SRATE: 102 MMOL/24 HR (ref 110–250)
CITRATE 24H UR-MCNC: 220 MG/L
CITRATE 24H UR-MRATE: 407 MG/24 HR (ref 320–1240)
COM CRY STONE QL IR: 2.4 RATIO (ref 0–6)
CREAT 24H UR-MCNC: 46.5 MG/DL
CREAT 24H UR-MRATE: 860.3 MG/24 HR (ref 800–1800)
CYSTINE 24H UR-MCNC: 2.93 MG/L
CYSTINE 24H UR-MRATE: 5.42 MG/24 HR (ref 10–100)
MAGNESIUM 24H UR-MRATE: 78 MG/24 HR (ref 12–293)
MAGNESIUM UR-MCNC: 4.2 MG/DL
NA URATE CRY STONE QL IR: 2.08 RATIO (ref 0–4)
OSMOLALITY UR: 315 MOSMOL/KG (ref 300–900)
OXALATE 24H UR-MRATE: 9 MG/24 HR (ref 4–31)
OXALATE UR-MCNC: 5 MG/L
PH 24H UR: 6.4 [PH]
PHOSPHATE 24H UR-MCNC: 35.6 MG/DL
PHOSPHATE 24H UR-MRATE: 658.6 MG/24 HR (ref 400–1300)
PLEASE NOTE (STONE RISK): ABNORMAL
POTASSIUM 24H UR-SCNC: 40.5 MMOL/24 HR (ref 25–125)
POTASSIUM UR-SCNC: 21.9 MMOL/L
PRESERVED URINE: 1850 ML/24 HR (ref 600–1600)
SODIUM 24H UR-SCNC: 64 MMOL/L
SODIUM 24H UR-SRATE: 118 MMOL/24 HR (ref 39–258)
SPECIMEN VOL 24H UR: 1850 ML/24 HR (ref 600–1600)
SULFATE 24H UR-MCNC: 24 MEQ/24 HR (ref 0–30)
SULFATE UR-MCNC: 13 MEQ/L
TRI-PHOS CRY STONE MICRO: 0.03 RATIO (ref 0–1)
URATE 24H UR-MCNC: 28.3 MG/DL
URATE 24H UR-MRATE: 524 MG/24 HR (ref 250–750)
URATE DIHYD CRY STONE QL IR: 0.51 RATIO (ref 0–1.2)

## 2020-01-13 ENCOUNTER — OFFICE VISIT (OUTPATIENT)
Dept: NEPHROLOGY | Facility: CLINIC | Age: 45
End: 2020-01-13
Payer: COMMERCIAL

## 2020-01-13 VITALS
HEIGHT: 62 IN | BODY MASS INDEX: 41.81 KG/M2 | WEIGHT: 227.2 LBS | DIASTOLIC BLOOD PRESSURE: 68 MMHG | SYSTOLIC BLOOD PRESSURE: 101 MMHG | HEART RATE: 91 BPM

## 2020-01-13 DIAGNOSIS — N13.2 HYDRONEPHROSIS WITH RENAL AND URETERAL CALCULUS OBSTRUCTION: ICD-10-CM

## 2020-01-13 DIAGNOSIS — N13.30 HYDRONEPHROSIS OF LEFT KIDNEY: ICD-10-CM

## 2020-01-13 DIAGNOSIS — N20.0 NEPHROLITHIASIS: Primary | ICD-10-CM

## 2020-01-13 DIAGNOSIS — E87.1 HYPONATREMIA: ICD-10-CM

## 2020-01-13 PROCEDURE — 99214 OFFICE O/P EST MOD 30 MIN: CPT | Performed by: INTERNAL MEDICINE

## 2020-01-13 RX ORDER — ERGOCALCIFEROL 1.25 MG/1
50000 CAPSULE ORAL WEEKLY
Qty: 6 CAPSULE | Refills: 0 | Status: SHIPPED | OUTPATIENT
Start: 2020-01-13 | End: 2021-01-13

## 2020-01-13 NOTE — PATIENT INSTRUCTIONS
Kidney Stones   AMBULATORY CARE:   Kidney stones  form in the urinary system when the water and waste in your urine are out of balance  When this happens, certain types of waste crystals separate from the urine  The crystals build up and form kidney stones  Kidney stones can be made of uric acid, calcium, phosphate, or oxalate crystals  You may have 1 or more kidney stones  Common symptoms include the following:   · Pain in the middle of your back that moves across to your side or that may spread to your groin    · Nausea and vomiting    · Urge to urinate often, burning feeling when you urinate, or pink or red urine    · Tenderness in your lower back, side, or stomach  Seek care immediately if:   · You have vomiting that is not relieved by medicine  Contact your healthcare provider if:   · You have a fever  · You have trouble passing urine  · You see blood in your urine  · You have severe pain  · You have any questions or concerns about your condition or care  Treatment for kidney stones  may include any of the following:  · NSAIDs , such as ibuprofen, help decrease swelling, pain, and fever  This medicine is available with or without a doctor's order  NSAIDs can cause stomach bleeding or kidney problems in certain people  If you take blood thinner medicine, always ask your healthcare provider if NSAIDs are safe for you  Always read the medicine label and follow directions  · Prescription medicine  may be given  Ask how to take this medicine safely  · Medicines  to balance your electrolytes may be needed  · A procedure or surgery  to remove the kidney stones may be needed if they do not pass on their own  Your treatment will depend on the size and location of your kidney stones  Manage your symptoms:   · Drink plenty of liquids  Your healthcare provider may tell you to drink at least 8 to 12 (eight-ounce) cups of liquids each day   This helps flush out the kidney stones when you urinate  Water is the best liquid to drink  · Strain your urine every time you go to the bathroom  Urinate through a strainer or a piece of thin cloth to catch the stones  Take the stones to your healthcare provider so they can be sent to the lab for tests  This will help your healthcare providers plan the best treatment for you  · Eat a variety of healthy foods  Healthy foods include fruits, vegetables, whole-grain breads, low-fat dairy products, beans, and fish  You may need to limit how much sodium (salt) or protein you eat  Ask for information about the best foods for you  · Exercise regularly  Your stones may pass more easily if you stay active  Ask about the best activities for you  After you pass your kidney stones:  Once you have passed your kidney stones, your healthcare provider may  order a 24-hour urine test  Results from a 24-hour urine test will help your healthcare provider plan ways to prevent more stones from forming  If you are told to do a 24-hour test, your healthcare provider will give you more instructions  Follow up with your healthcare provider as directed:  Write down your questions so you remember to ask them during your visits  © 2017 [address] [facility] Information is for End User's use only and may not be sold, redistributed or otherwise used for commercial purposes  All illustrations and images included in CareNotes® are the copyrighted property of [employer] , [employer]  or Jon Penn  The above information is an  only  It is not intended as medical advice for individual conditions or treatments  Talk to your doctor, nurse or pharmacist before following any medical regimen to see if it is safe and effective for you

## 2020-01-13 NOTE — PROGRESS NOTES
OFFICE FOLLOW UP - Nephrology   Kristina Emperor Grayomuceno 45 y o  female MRN: [id number]    Encounter: [id number]        ASSESSMENT    53-year-old female with a past medical history the past medical history of nephrolithiasis presents for follow-up evaluation    Nephrolithiasis  Hydronephrosis with renal and ureteral calculus obstruction  Hydronephrosis of left kidney  Hyponatremia  GERD (gastroesophageal reflux disease)  Hypomagnesemia    DISCUSSION & PLAN    1  Nephrolithiasis  -in over a year she has not passed a stone, but in May there was concern a she had some hydronephrosis on her scanned  -he follows up with Urology on a yearly basis  -renal function remains stable  -not tolerating potassium citrate well with significant GI side effects and worsening of acid reflux  -started chlorthalidone for hypocalciuric affect  24 hour urine calcium not worse  -no kidney stone pain a currently feels okay  -continue hydration  -can start Calcium supplement if needed    2  Hydronephrosis with ureteral calculus obstruction  -this seems to be resolving her renal function remains stable she is getting yearly ultrasounds by Urology    3  Hyponatremia  -her sodium remained stable at 136 will monitor on chlorthalidone, electrolytes are stable    4  GERD  -acid reflux improved    5  Hypomagnesemia  -continue magnesium supplementation    6  hypokalemia  -continue potassium supplementation could be related to chlorthalidone     7  Vitamin-D deficiency  -start ergo calciferol 70912 units weekly x6 weeks and then 1000 units daily thereafter    Continue yearly follow-up      HPI: Vickie Fernandez is a 45 y o  female who is here for follow-up    She is here for follow-up, currently feels okay, has made some lifestyle changes and is working out more denies any chest pain or shortness of Breath no fevers or chills no nausea vomiting diarrhea or constipation no foamy or bloody urine, or blood pressures remained stable will she is not lightheaded blood work reviewed and her electrolytes are stable, she did not tolerate potassium citrate but seems to be tolerating the chlorthalidone at a low-dose S her blood pressures are controlled she did have some mild hyponatremia but this has improved her potassium is stable she has good urine output some soreness from her work outs and otherwise stable      ROS:   Review of systems was otherwise negative    Allergies: Patient has no known allergies      Medications:   Current Outpatient Medications:     chlorthalidone 25 mg tablet, Take 0 5 tablets (12 5 mg total) by mouth daily, Disp: 45 tablet, Rfl: 3    magnesium oxide (MAG-OX) 400 mg, Take 1 tablet (400 mg total) by mouth daily, Disp: 90 tablet, Rfl: 3    potassium chloride (K-DUR,KLOR-CON) 20 mEq tablet, Take 1 tablet (20 mEq total) by mouth daily, Disp: 90 tablet, Rfl: 3    ergocalciferol (VITAMIN D2) 50,000 units, Take 1 capsule (50,000 Units total) by mouth once a week, Disp: 6 capsule, Rfl: 0    Past Medical History:   Diagnosis Date    DJD (degenerative joint disease)     Eczema     History of gestational diabetes     Hydronephrosis     last assessed 17 documented resolved 17    Kidney stones     Menorrhagia with regular cycle 2018    treated with Lysteda    Nephrolithiasis     Obesity     Seasonal allergies     Varicella      Past Surgical History:   Procedure Laterality Date     SECTION      X 2  and      CYSTOSCOPY      DILATION AND CURETTAGE OF UTERUS      x 2 for missed     NM CYSTO/URETERO W/LITHOTRIPSY &INDWELL STENT INSRT Right 2016    Procedure: CYSTOSCOPY; URETEROSCOPY; HOLMIUM LASER LITHOTRIPSY; BASKET STONE EXTRACTION; RETROGRADE PYELOGRAM; STENT PLACEMENT ;  Surgeon: Samanta De La Garza MD;  Location: [facility] OR;  Service: Urology    NM CYSTO/URETERO W/LITHOTRIPSY &INDWELL STENT INSRT Left 2018    Procedure: CYSTOSCOPY, LEFT URETEROSCOPY AND RENAL ENDOSCOPY WITH LITHOTRIPSY OF RENAL CALCULUS WITH HOLMIUM LASER,  BILATERAL RETROGRADE PYELOGRAM AND INSERTION OF LEFT URETERAL STENT;  Surgeon: Nia Hemphill MD;  [facility];  Service: Urology    TONSILLECTOMY      TUBAL LIGATION      at time of     WISDOM TOOTH EXTRACTION       Family History   Problem Relation Age of Onset    Diabetes Mother     Heart attack Father     Heart disease Father         myocardial infarction    Hyperlipidemia Father     Hypertension Father     Coronary artery disease Father     Cancer Father         malignant neoplasm of urinary bladder    Multiple sclerosis Sister     COPD Maternal Grandmother     Liver cancer Paternal Grandmother     Pancreatic cancer Paternal Grandmother     Diabetes Paternal Grandmother     Stomach cancer Paternal Grandfather     Endometrial cancer Paternal Aunt     Breast cancer Neg Hx     Ovarian cancer Neg Hx     Colon cancer Neg Hx       reports that she has never smoked  She has never used smokeless tobacco  She reports that she drinks alcohol  She reports that she does not use drugs  Physical Exam:   Vitals:    20 1127   BP: 101/68   BP Location: Right arm   Patient Position: Sitting   Cuff Size: Large   Pulse: 91   Weight: 103 kg (227 lb 3 2 oz)   Height: 5' 2" (1 575 m)     Body mass index is 41 56 kg/m²      General: conscious, cooperative, in not acute distress  Eyes: conjunctivae pink, anicteric sclerae  ENT: lips and mucous membranes moist  Neck: supple, no JVD  Chest: clear breath sounds bilateral, no crackles, ronchus or wheezings  CVS: distinct S1 & S2, normal rate, regular rhythm  Abdomen: soft, non-tender, non-distended, normoactive bowel sounds  Extremities: no edema of both legs  Skin: no rash  Neuro: awake, alert, oriented      Lab Results:    Results for orders placed or performed in visit on 19   CBC   Result Value Ref Range    WBC 8 62 4 31 - 10 16 Thousand/uL    RBC 4 72 3 81 - 5 12 Million/uL    Hemoglobin 12 3 11 5 - 15 4 g/dL    Hematocrit 39 7 34 8 - 46 1 %    MCV 84 82 - 98 fL    MCH 26 1 (L) 26 8 - 34 3 pg    MCHC 31 0 (L) 31 4 - 37 4 g/dL    RDW 14 3 11 6 - 15 1 %    Platelets 171 062 - 331 Thousands/uL    MPV 10 9 8 9 - 12 7 fL   Prolactin   Result Value Ref Range    Prolactin 9 0   ng/mL   TSH, 3rd generation with Free T4 reflex   Result Value Ref Range    TSH 3RD GENERATON 1 790 0 358 - 3 740 uIU/mL       Portions of the record may have been created with voice recognition software  Occasional wrong word or "sound a like" substitutions may have occurred due to the inherent limitations of voice recognition software  Read the chart carefully and recognize, using context, where substitutions have occurred  If you have any questions, please contact the dictating provider

## 2020-02-11 DIAGNOSIS — N20.0 NEPHROLITHIASIS: ICD-10-CM

## 2020-02-11 RX ORDER — CHLORTHALIDONE 25 MG/1
12.5 TABLET ORAL DAILY
Qty: 45 TABLET | Refills: 3 | Status: SHIPPED | OUTPATIENT
Start: 2020-02-11 | End: 2021-01-27

## 2020-02-25 ENCOUNTER — HOSPITAL ENCOUNTER (OUTPATIENT)
Dept: RADIOLOGY | Age: 45
Discharge: HOME | End: 2020-02-25
Payer: COMMERCIAL

## 2020-02-25 VITALS — BODY MASS INDEX: 41.77 KG/M2 | WEIGHT: 227 LBS | HEIGHT: 62 IN

## 2020-02-25 DIAGNOSIS — Z12.31 VISIT FOR SCREENING MAMMOGRAM: ICD-10-CM

## 2020-02-25 DIAGNOSIS — N92.0 MENORRHAGIA WITH REGULAR CYCLE: ICD-10-CM

## 2020-02-25 PROCEDURE — 76830 TRANSVAGINAL US NON-OB: CPT

## 2020-02-25 PROCEDURE — 76856 US EXAM PELVIC COMPLETE: CPT

## 2020-02-25 PROCEDURE — 77063 BREAST TOMOSYNTHESIS BI: CPT

## 2020-02-25 PROCEDURE — 77067 SCR MAMMO BI INCL CAD: CPT

## 2020-05-26 DIAGNOSIS — N20.0 NEPHROLITHIASIS: ICD-10-CM

## 2020-05-26 DIAGNOSIS — E87.1 HYPONATREMIA: ICD-10-CM

## 2020-05-26 RX ORDER — POTASSIUM CHLORIDE 20 MEQ/1
TABLET, EXTENDED RELEASE ORAL
Qty: 90 TABLET | Refills: 3 | Status: SHIPPED | OUTPATIENT
Start: 2020-05-26 | End: 2021-01-27

## 2020-10-04 ENCOUNTER — NURSE TRIAGE (OUTPATIENT)
Dept: OTHER | Facility: OTHER | Age: 45
End: 2020-10-04

## 2020-10-04 DIAGNOSIS — Z11.59 ENCOUNTER FOR SCREENING FOR OTHER VIRAL DISEASES: Primary | ICD-10-CM

## 2020-10-05 DIAGNOSIS — Z11.59 ENCOUNTER FOR SCREENING FOR OTHER VIRAL DISEASES: ICD-10-CM

## 2020-10-05 PROCEDURE — U0003 INFECTIOUS AGENT DETECTION BY NUCLEIC ACID (DNA OR RNA); SEVERE ACUTE RESPIRATORY SYNDROME CORONAVIRUS 2 (SARS-COV-2) (CORONAVIRUS DISEASE [COVID-19]), AMPLIFIED PROBE TECHNIQUE, MAKING USE OF HIGH THROUGHPUT TECHNOLOGIES AS DESCRIBED BY CMS-2020-01-R: HCPCS | Performed by: PHYSICIAN ASSISTANT

## 2020-10-06 ENCOUNTER — TELEPHONE (OUTPATIENT)
Dept: FAMILY MEDICINE CLINIC | Facility: CLINIC | Age: 45
End: 2020-10-06

## 2020-10-06 LAB — SARS-COV-2 RNA SPEC QL NAA+PROBE: NOT DETECTED

## 2020-12-01 ENCOUNTER — TELEPHONE (OUTPATIENT)
Dept: NEPHROLOGY | Facility: CLINIC | Age: 45
End: 2020-12-01

## 2020-12-11 ENCOUNTER — LAB (OUTPATIENT)
Dept: LAB | Facility: HOSPITAL | Age: 45
End: 2020-12-11
Attending: INTERNAL MEDICINE
Payer: COMMERCIAL

## 2020-12-11 DIAGNOSIS — N20.0 NEPHROLITHIASIS: ICD-10-CM

## 2020-12-11 DIAGNOSIS — N13.30 HYDRONEPHROSIS OF LEFT KIDNEY: ICD-10-CM

## 2020-12-11 DIAGNOSIS — N13.2 HYDRONEPHROSIS WITH RENAL AND URETERAL CALCULUS OBSTRUCTION: ICD-10-CM

## 2020-12-11 DIAGNOSIS — E87.1 HYPONATREMIA: ICD-10-CM

## 2020-12-11 LAB
ALBUMIN SERPL BCP-MCNC: 3.5 G/DL (ref 3.5–5)
ALP SERPL-CCNC: 77 U/L (ref 46–116)
ALT SERPL W P-5'-P-CCNC: 19 U/L (ref 12–78)
ANION GAP SERPL CALCULATED.3IONS-SCNC: 3 MMOL/L (ref 4–13)
AST SERPL W P-5'-P-CCNC: 10 U/L (ref 5–45)
BILIRUB SERPL-MCNC: 0.32 MG/DL (ref 0.2–1)
BUN SERPL-MCNC: 17 MG/DL (ref 5–25)
CALCIUM SERPL-MCNC: 9.4 MG/DL (ref 8.3–10.1)
CHLORIDE SERPL-SCNC: 101 MMOL/L (ref 100–108)
CO2 SERPL-SCNC: 34 MMOL/L (ref 21–32)
CREAT SERPL-MCNC: 0.87 MG/DL (ref 0.6–1.3)
GFR SERPL CREATININE-BSD FRML MDRD: 82 ML/MIN/1.73SQ M
GLUCOSE SERPL-MCNC: 108 MG/DL (ref 65–140)
MAGNESIUM SERPL-MCNC: 2 MG/DL (ref 1.6–2.6)
PHOSPHATE SERPL-MCNC: 3.8 MG/DL (ref 2.7–4.5)
POTASSIUM SERPL-SCNC: 3.7 MMOL/L (ref 3.5–5.3)
PROT SERPL-MCNC: 7.2 G/DL (ref 6.4–8.2)
PTH-INTACT SERPL-MCNC: 26.8 PG/ML (ref 18.4–80.1)
SODIUM SERPL-SCNC: 138 MMOL/L (ref 136–145)
URATE SERPL-MCNC: 4.8 MG/DL (ref 2–6.8)

## 2020-12-11 PROCEDURE — 83970 ASSAY OF PARATHORMONE: CPT

## 2020-12-11 PROCEDURE — 84100 ASSAY OF PHOSPHORUS: CPT

## 2020-12-11 PROCEDURE — 83735 ASSAY OF MAGNESIUM: CPT

## 2020-12-11 PROCEDURE — 80053 COMPREHEN METABOLIC PANEL: CPT

## 2020-12-11 PROCEDURE — 84550 ASSAY OF BLOOD/URIC ACID: CPT

## 2020-12-11 PROCEDURE — 36415 COLL VENOUS BLD VENIPUNCTURE: CPT

## 2020-12-22 ENCOUNTER — TELEPHONE (OUTPATIENT)
Dept: DERMATOLOGY | Facility: CLINIC | Age: 45
End: 2020-12-22

## 2020-12-29 ENCOUNTER — LAB (OUTPATIENT)
Dept: LAB | Facility: HOSPITAL | Age: 45
End: 2020-12-29
Attending: INTERNAL MEDICINE
Payer: COMMERCIAL

## 2020-12-29 PROCEDURE — 82507 ASSAY OF CITRATE: CPT | Performed by: INTERNAL MEDICINE

## 2020-12-29 PROCEDURE — 83735 ASSAY OF MAGNESIUM: CPT | Performed by: INTERNAL MEDICINE

## 2020-12-29 PROCEDURE — 84300 ASSAY OF URINE SODIUM: CPT | Performed by: INTERNAL MEDICINE

## 2020-12-29 PROCEDURE — 82140 ASSAY OF AMMONIA: CPT | Performed by: INTERNAL MEDICINE

## 2020-12-29 PROCEDURE — 83935 ASSAY OF URINE OSMOLALITY: CPT | Performed by: INTERNAL MEDICINE

## 2020-12-29 PROCEDURE — 81003 URINALYSIS AUTO W/O SCOPE: CPT | Performed by: INTERNAL MEDICINE

## 2020-12-29 PROCEDURE — 83945 ASSAY OF OXALATE: CPT | Performed by: INTERNAL MEDICINE

## 2020-12-29 PROCEDURE — 84133 ASSAY OF URINE POTASSIUM: CPT | Performed by: INTERNAL MEDICINE

## 2020-12-29 PROCEDURE — 84105 ASSAY OF URINE PHOSPHORUS: CPT | Performed by: INTERNAL MEDICINE

## 2020-12-29 PROCEDURE — 82131 AMINO ACIDS SINGLE QUANT: CPT | Performed by: INTERNAL MEDICINE

## 2020-12-29 PROCEDURE — 82340 ASSAY OF CALCIUM IN URINE: CPT | Performed by: INTERNAL MEDICINE

## 2020-12-29 PROCEDURE — 82436 ASSAY OF URINE CHLORIDE: CPT | Performed by: INTERNAL MEDICINE

## 2020-12-29 PROCEDURE — 84560 ASSAY OF URINE/URIC ACID: CPT | Performed by: INTERNAL MEDICINE

## 2020-12-29 PROCEDURE — 82570 ASSAY OF URINE CREATININE: CPT | Performed by: INTERNAL MEDICINE

## 2020-12-29 PROCEDURE — 84392 ASSAY OF URINE SULFATE: CPT | Performed by: INTERNAL MEDICINE

## 2021-01-08 LAB
AMMONIA 24H UR-MRATE: 40 MEQ/24 HR
AMMONIA UR-SCNC: ABNORMAL UG/DL
CA H2 PHOS DIHYD CRY URNS QL MICRO: 4.76 RATIO (ref 0–3)
CALCIUM 24H UR-MCNC: 24.3 MG/DL
CALCIUM 24H UR-MRATE: 437.4 MG/24 HR (ref 100–300)
CHLORIDE 24H UR-SCNC: 122 MMOL/L
CHLORIDE 24H UR-SRATE: 220 MMOL/24 HR (ref 110–250)
CITRATE 24H UR-MCNC: 350 MG/L
CITRATE 24H UR-MRATE: 630 MG/24 HR (ref 320–1240)
COM CRY STONE QL IR: 6.88 RATIO (ref 0–6)
CREAT 24H UR-MCNC: 84.3 MG/DL
CREAT 24H UR-MRATE: 1517.4 MG/24 HR (ref 800–1800)
CYSTINE 24H UR-MCNC: 7.92 MG/L
CYSTINE 24H UR-MRATE: 14.26 MG/24 HR (ref 10–100)
MAGNESIUM 24H UR-MRATE: 122 MG/24 HR (ref 12–293)
MAGNESIUM UR-MCNC: 6.8 MG/DL
NA URATE CRY STONE QL IR: 6.02 RATIO (ref 0–4)
OSMOLALITY UR: 605 MOSMOL/KG (ref 300–900)
OXALATE 24H UR-MRATE: 25 MG/24 HR (ref 4–31)
OXALATE UR-MCNC: 14 MG/L
PH 24H UR: 6.3 [PH]
PHOSPHATE 24H UR-MCNC: 55.6 MG/DL
PHOSPHATE 24H UR-MRATE: 1000.8 MG/24 HR (ref 400–1300)
PLEASE NOTE (STONE RISK): ABNORMAL
POTASSIUM 24H UR-SCNC: 64.4 MMOL/24 HR (ref 25–125)
POTASSIUM UR-SCNC: 35.8 MMOL/L
PRESERVED URINE: 1800 ML/24 HR (ref 600–1600)
SODIUM 24H UR-SCNC: 134 MMOL/L
SODIUM 24H UR-SRATE: 241 MMOL/24 HR (ref 39–258)
SPECIMEN VOL 24H UR: 1800 ML/24 HR (ref 600–1600)
SULFATE 24H UR-MCNC: 31 MEQ/24 HR (ref 0–30)
SULFATE UR-MCNC: 17 MEQ/L
TRI-PHOS CRY STONE MICRO: 0.08 RATIO (ref 0–1)
URATE 24H UR-MCNC: 40.1 MG/DL
URATE 24H UR-MRATE: 722 MG/24 HR (ref 250–750)
URATE DIHYD CRY STONE QL IR: 0.9 RATIO (ref 0–1.2)

## 2021-01-13 ENCOUNTER — ANNUAL EXAM (OUTPATIENT)
Dept: OBGYN CLINIC | Facility: CLINIC | Age: 46
End: 2021-01-13
Payer: COMMERCIAL

## 2021-01-13 VITALS
BODY MASS INDEX: 42.33 KG/M2 | DIASTOLIC BLOOD PRESSURE: 70 MMHG | HEIGHT: 62 IN | SYSTOLIC BLOOD PRESSURE: 118 MMHG | WEIGHT: 230 LBS

## 2021-01-13 DIAGNOSIS — Z12.31 ENCOUNTER FOR SCREENING MAMMOGRAM FOR BREAST CANCER: ICD-10-CM

## 2021-01-13 DIAGNOSIS — Z01.419 ENCOUNTER FOR GYNECOLOGICAL EXAMINATION WITHOUT ABNORMAL FINDING: Primary | ICD-10-CM

## 2021-01-13 DIAGNOSIS — Z11.51 SCREENING FOR HPV (HUMAN PAPILLOMAVIRUS): ICD-10-CM

## 2021-01-13 DIAGNOSIS — Z12.4 ENCOUNTER FOR PAPANICOLAOU SMEAR FOR CERVICAL CANCER SCREENING: ICD-10-CM

## 2021-01-13 PROCEDURE — 99396 PREV VISIT EST AGE 40-64: CPT | Performed by: NURSE PRACTITIONER

## 2021-01-13 PROCEDURE — G0145 SCR C/V CYTO,THINLAYER,RESCR: HCPCS | Performed by: NURSE PRACTITIONER

## 2021-01-13 PROCEDURE — 87624 HPV HI-RISK TYP POOLED RSLT: CPT | Performed by: NURSE PRACTITIONER

## 2021-01-13 NOTE — PROGRESS NOTES
Assessment / Plan    1  Encounter for gynecological examination without abnormal finding  Normal well woman exam  Pap with hpv updated    2  Encounter for Papanicolaou smear for cervical cancer screening    - Liquid-based pap, screening    3  Screening for HPV (human papillomavirus)    - Liquid-based pap, screening    4  Encounter for screening mammogram for breast cancer  Has appt early next month  Subjective      Winston Rubio is a 39 y o  female who presents for her annual gynecologic exam     2017 pap/hpv negative  2020 mammo 3d, benign    Not engaging in exercise or diet program   Just ordered a treadmill  Periods are regular, but heavy  Has discussed management in past with Dr Milagros Garcias, but decided to forgo any treatment  Current contraception: tubal ligation  History of abnormal Pap smear: no  Family history of breast,uterine, ovarian or colon cancer: yes - pat half-aunt endometrial cancer; PGM pancreatic ca, PGF stomach ca      Menstrual History:  OB History        4    Para   2    Term   2            AB   2    Living   2       SAB   2    TAB        Ectopic        Multiple        Live Births               Obstetric Comments   Menarche: 14    Menses: -8/maxipad every 24 hours for 2 days, and then regular tampon every 2 hours x 2 days and then maxipad once daily thereafter              Patient's last menstrual period was 2021  The following portions of the patient's history were reviewed and updated as appropriate: allergies, current medications, past family history, past medical history, past social history, past surgical history and problem list     Review of Systems      Review of Systems   Constitutional: Negative for chills and fever  Respiratory: Negative for cough and shortness of breath  Gastrointestinal: Negative for abdominal distention, abdominal pain, blood in stool, constipation, diarrhea, nausea and vomiting     Genitourinary: Negative for difficulty urinating, dysuria, frequency, genital sores, hematuria, menstrual problem, pelvic pain, urgency, vaginal bleeding and vaginal discharge  Musculoskeletal: Negative for arthralgias and myalgias  Breasts:  Negative for skin changes, dimpling, asymmetry, nipple discharge, redness, tenderness or palpable masses    Objective      /70 (BP Location: Left arm, Patient Position: Sitting, Cuff Size: Adult)   Ht 5' 2" (1 575 m)   Wt 104 kg (230 lb)   LMP 01/06/2021   BMI 42 07 kg/m²      Physical Exam  Constitutional:       General: She is not in acute distress  Appearance: Normal appearance  She is well-developed  She is obese  She is not ill-appearing or diaphoretic  HENT:      Head: Normocephalic and atraumatic  Eyes:      Pupils: Pupils are equal, round, and reactive to light  Neck:      Musculoskeletal: Neck supple  Thyroid: No thyromegaly  Pulmonary:      Effort: Pulmonary effort is normal    Chest:      Breasts: Breasts are symmetrical          Right: No inverted nipple, mass, nipple discharge, skin change or tenderness  Left: No inverted nipple, mass, nipple discharge, skin change or tenderness  Abdominal:      General: There is no distension  Palpations: Abdomen is soft  There is no mass  Tenderness: There is abdominal tenderness (right mid quad)  There is no guarding or rebound  Genitourinary:     General: Normal vulva  Exam position: Lithotomy position  Labia:         Right: No rash, tenderness, lesion or injury  Left: No rash, tenderness, lesion or injury  Vagina: No signs of injury and foreign body  Bleeding (menstrual, small amount) present  No vaginal discharge, erythema or tenderness  Cervix: No cervical motion tenderness, discharge or friability  Uterus: Not enlarged and not tender  Adnexa:         Right: No mass or tenderness  Left: No mass or tenderness       Lymphadenopathy:      Cervical: No cervical adenopathy  Upper Body:      Right upper body: No supraclavicular adenopathy  Left upper body: No supraclavicular adenopathy  Skin:     General: Skin is warm and dry  Neurological:      General: No focal deficit present  Mental Status: She is alert and oriented to person, place, and time  Psychiatric:         Mood and Affect: Mood normal          Behavior: Behavior normal          Thought Content:  Thought content normal          Judgment: Judgment normal

## 2021-01-16 LAB
HPV HR 12 DNA CVX QL NAA+PROBE: NEGATIVE
HPV16 DNA CVX QL NAA+PROBE: NEGATIVE
HPV18 DNA CVX QL NAA+PROBE: NEGATIVE

## 2021-01-18 LAB
LAB AP GYN PRIMARY INTERPRETATION: NORMAL
Lab: NORMAL

## 2021-01-27 ENCOUNTER — OFFICE VISIT (OUTPATIENT)
Dept: NEPHROLOGY | Facility: CLINIC | Age: 46
End: 2021-01-27
Payer: COMMERCIAL

## 2021-01-27 VITALS
SYSTOLIC BLOOD PRESSURE: 118 MMHG | WEIGHT: 229 LBS | DIASTOLIC BLOOD PRESSURE: 68 MMHG | HEART RATE: 107 BPM | OXYGEN SATURATION: 97 % | BODY MASS INDEX: 42.14 KG/M2 | HEIGHT: 62 IN

## 2021-01-27 DIAGNOSIS — N13.30 HYDRONEPHROSIS OF LEFT KIDNEY: ICD-10-CM

## 2021-01-27 DIAGNOSIS — E87.1 HYPONATREMIA: ICD-10-CM

## 2021-01-27 DIAGNOSIS — N13.2 HYDRONEPHROSIS WITH RENAL AND URETERAL CALCULUS OBSTRUCTION: ICD-10-CM

## 2021-01-27 DIAGNOSIS — N20.0 NEPHROLITHIASIS: Primary | ICD-10-CM

## 2021-01-27 PROCEDURE — 99214 OFFICE O/P EST MOD 30 MIN: CPT | Performed by: INTERNAL MEDICINE

## 2021-01-27 RX ORDER — POTASSIUM CHLORIDE 750 MG/1
10 TABLET, EXTENDED RELEASE ORAL 2 TIMES DAILY
Qty: 180 TABLET | Refills: 3 | Status: SHIPPED | OUTPATIENT
Start: 2021-01-27 | End: 2021-01-31

## 2021-01-27 RX ORDER — FAMOTIDINE 20 MG/1
20 TABLET, FILM COATED ORAL DAILY
COMMUNITY

## 2021-01-27 RX ORDER — HYDROCHLOROTHIAZIDE 25 MG/1
25 TABLET ORAL DAILY
Qty: 90 TABLET | Refills: 3 | Status: SHIPPED | OUTPATIENT
Start: 2021-01-27 | End: 2022-02-18 | Stop reason: SDUPTHER

## 2021-01-27 NOTE — PROGRESS NOTES
OFFICE FOLLOW UP - Nephrology   Reid Rubio 39 y o  female MRN: [id number]    Encounter: [id number]        ASSESSMENT    72-year-old female with a past medical history the past medical history of nephrolithiasis presents for follow-up evaluation    Nephrolithiasis  Hydronephrosis with renal and ureteral calculus obstruction  Hydronephrosis of left kidney  Hyponatremia  GERD (gastroesophageal reflux disease)  Hypomagnesemia    DISCUSSION & PLAN    1  Nephrolithiasis  -she feels like she has a stone currently with some pain in back  -medical management of stones has been difficult  -her 24 hour Urine calcium did increase from previous  -she is on chlorothalidone 12 5 mg daily  -she also started on Calcium supplements over 6 months  -renal function remains stable  -she did have worsening GI symptoms wodos she took K citrate in the past but now is on pepcid and her symptoms have improved so will try k citrate again  -will change chlorthalidone to HCTZ and increase the dose given that its shorter acting may have less bp effect and greater hypocalciuric effect  BP is well controlled and do not wont this  To decrease  -continue hydration two liters daily  -hold calcium supplement for now  -recheck a renal US  -CT abd/pelvis does show moderate hydroureteronephrosis with no calculi in right pelvis  -Had NM kidney with flow and function-63% left kidney perfusion 63% Right kidney 36 9%  -Left renal excretion was within normal limits  -Right Kidney demonstrates stasis of readiotracer but responds normally to lasix administration compatibily with a nonobstruced system    2  Hydronephrosis with ureteral calculus obstruction  -this seems to be resolving her renal function remains stable she is getting yearly ultrasounds by Urology  -needs to find a new Urologist previous urologist has moved    3  Hyponatremia  -her sodium remained stable at 136 will monitor on chlorthalidone, electrolytes are stable    4   GERD  -acid reflux improved on pepcid    5  Hypomagnesemia  -continue magnesium supplementation this has improved    6  hypokalemia  -continue potassium supplementation could be related to chlorthalidone, underlying RTA  -change to HCTZ which she may tolerate better from an electrolyte and mineral standpoint     7  Vitamin-D deficiency  -will hold her D/Ca supplements for now    24 hrs urine stone risk profile, renal us, blood work in 1 month and will call her with results and follow up in 6 months if stable  HPI: Veronica Santillan is a 39 y o  female who is here for follow-up    She is here for follow-up has been relatively stable does have some pain in her back she states feels like a stone has been taking her medications regularly without any problems denies any acute chest pain or shortness of breath fevers or chills her 24 hour calcium was elevated     otherwise no acute issues since her last visit has not passed any stones      ROS:   Review of systems was otherwise negative    Allergies: Patient has no known allergies      Medications:   Current Outpatient Medications:     famotidine (PEPCID) 20 mg tablet, Take 20 mg by mouth daily, Disp: , Rfl:     magnesium oxide (MAG-OX) 400 mg, Take 1 tablet (400 mg total) by mouth daily, Disp: 90 tablet, Rfl: 3    hydrochlorothiazide (HYDRODIURIL) 25 mg tablet, Take 1 tablet (25 mg total) by mouth daily, Disp: 90 tablet, Rfl: 3    potassium chloride (K-DUR,KLOR-CON) 10 mEq tablet, Take 1 tablet (10 mEq total) by mouth 2 (two) times a day, Disp: 180 tablet, Rfl: 3    Past Medical History:   Diagnosis Date    DJD (degenerative joint disease)     Eczema     History of gestational diabetes     Hydronephrosis     last assessed 1/23/17 documented resolved 9/20/17    Kidney stones 05/2020    Menorrhagia with regular cycle 01/2018    treated with Lysteda    Nephrolithiasis     Obesity     Seasonal allergies     Varicella      Past Surgical History:   Procedure Laterality Date     SECTION      X 2  and      CYSTOSCOPY      DILATION AND CURETTAGE OF UTERUS      x 2 for missed     UT CYSTO/URETERO W/LITHOTRIPSY &INDWELL STENT INSRT Right 2016    Procedure: CYSTOSCOPY; URETEROSCOPY; HOLMIUM LASER LITHOTRIPSY; BASKET STONE EXTRACTION; RETROGRADE PYELOGRAM; STENT PLACEMENT ;  Surgeon: Mitch Ortega MD;  Location[facility];  Service: Urology    UT CYSTO/URETERO W/LITHOTRIPSY &INDWELL STENT INSRT Left 2018    Procedure: CYSTOSCOPY, LEFT URETEROSCOPY AND RENAL ENDOSCOPY WITH LITHOTRIPSY OF RENAL CALCULUS WITH HOLMIUM LASER,  BILATERAL RETROGRADE PYELOGRAM AND INSERTION OF LEFT URETERAL STENT;  Surgeon: Leah Cash MD;  [facility];  Service: Urology    TONSILLECTOMY  2012    TUBAL LIGATION      at time of     WISDOM TOOTH EXTRACTION       Family History   Problem Relation Age of Onset    Diabetes Mother     Heart attack Father     Heart disease Father         myocardial infarction    Hyperlipidemia Father     Hypertension Father     Coronary artery disease Father     Cancer Father 54        malignant neoplasm of urinary bladder    Multiple sclerosis Sister     COPD Maternal Grandmother     Liver cancer Paternal Grandmother 79    Pancreatic cancer Paternal Grandmother 79    Diabetes Paternal Grandmother     Stomach cancer Paternal Grandfather         unknown age   Maribel Crystal No Known Problems Maternal Grandfather     Endometrial cancer Paternal Aunt 59    No Known Problems Son     No Known Problems Son     No Known Problems Sister     No Known Problems Maternal Aunt     No Known Problems Maternal Aunt     Breast cancer Neg Hx     Ovarian cancer Neg Hx     Colon cancer Neg Hx       reports that she has never smoked  She has never used smokeless tobacco  She reports current alcohol use  She reports that she does not use drugs        Physical Exam:   Vitals:    21 0936   BP: 118/68   Pulse: (!) 107   SpO2: 97% Weight: 104 kg (229 lb)   Height: 5' 2" (1 575 m)     Body mass index is 41 88 kg/m²  General: conscious, cooperative, in not acute distress  Eyes: conjunctivae pink, anicteric sclerae  ENT: lips and mucous membranes moist  Neck: supple, no JVD  Chest: clear breath sounds bilateral, no crackles, ronchus or wheezings  CVS: distinct S1 & S2, normal rate, regular rhythm  Abdomen: soft, non-tender, non-distended, normoactive bowel sounds  Extremities: no edema of both legs  Skin: no rash  Neuro: awake, alert, oriented      Lab Results:    Results for orders placed or performed in visit on 01/13/21   HPV High Risk    Specimen: Cervix; Genital   Result Value Ref Range    HPV Other HR Negative Negative    HPV16 Negative Negative    HPV18 Negative Negative   Liquid-based pap, screening   Result Value Ref Range    Case Report       Gynecologic Cytology Report                       Case: [id number]                                  Authorizing Provider:  ANGELITA Vo     Collected:           01/13/2021 1132              Ordering Location:     [facility] Received:            01/13/2021 1132              First Screen:          Layton [place]                                                               Specimen:    LIQUID-BASED PAP, SCREENING, Cervix, Endocervical                                          Primary Interpretation Negative for intraepithelial lesion or malignancy     Specimen Adequacy       Satisfactory for evaluation  Endocervical/transformation zone component present  Additional Information       [employer]'s FDA approved ,  and ThinPrep Imaging Duo System are utilized with strict adherence to the 's instruction manual to prepare gynecologic and non-gynecologic cytology specimens for the production of ThinPrep slides as well as for gynecologic ThinPrep imaging  These processes have been validated by our laboratory and/or by the     The Pap test is not a diagnostic procedure and should not be used as the sole means to detect cervical cancer  It is only a screening procedure to aid in the detection of cervical cancer and its precursors  Both false-negative and false-positive results have been experienced  Your patient's test result should be interpreted in this context together with the history and clinical findings  Portions of the record may have been created with voice recognition software  Occasional wrong word or "sound a like" substitutions may have occurred due to the inherent limitations of voice recognition software  Read the chart carefully and recognize, using context, where substitutions have occurred  If you have any questions, please contact the dictating provider

## 2021-01-27 NOTE — PATIENT INSTRUCTIONS
Kidney Stones   AMBULATORY CARE:   Kidney stones  form in the urinary system when the water and waste in your urine are out of balance  When this happens, certain types of waste crystals separate from the urine  The crystals build up and form kidney stones  Kidney stones can be made of uric acid, calcium, phosphate, or oxalate crystals  You may have 1 or more kidney stones  Common symptoms include the following:   · Pain in the middle of your back that moves across to your side or that may spread to your groin    · Nausea and vomiting    · Urge to urinate often, burning feeling when you urinate, or pink or red urine    · Tenderness in your lower back, side, or stomach    Seek care immediately if:   · You have vomiting that is not relieved by medicine  Contact your healthcare provider if:   · You have a fever  · You have trouble passing urine  · You see blood in your urine  · You have severe pain  · You have any questions or concerns about your condition or care  Treatment for kidney stones  may include any of the following:  · NSAIDs , such as ibuprofen, help decrease swelling, pain, and fever  This medicine is available with or without a doctor's order  NSAIDs can cause stomach bleeding or kidney problems in certain people  If you take blood thinner medicine, always ask your healthcare provider if NSAIDs are safe for you  Always read the medicine label and follow directions  · Prescription pain medicine  may be given  Ask your healthcare provider how to take this medicine safely  Some prescription pain medicines contain acetaminophen  Do not take other medicines that contain acetaminophen without talking to your healthcare provider  Too much acetaminophen may cause liver damage  Prescription pain medicine may cause constipation  Ask your healthcare provider how to prevent or treat constipation  · Medicines  to balance your electrolytes may be needed       · A procedure or surgery  to remove the kidney stones may be needed if they do not pass on their own  Your treatment will depend on the size and location of your kidney stones  What you can do to manage kidney stones:   · Drink more liquids  Your healthcare provider may tell you to drink at least 8 to 12 (eight-ounce) cups of liquids each day  This helps flush out the kidney stones when you urinate  Water is the best liquid to drink  · Strain your urine every time you go to the bathroom  Urinate through a strainer or a piece of thin cloth to catch the stones  Take the stones to your healthcare provider so they can be sent to the lab for tests  This will help your healthcare providers plan the best treatment for you  · Eat a variety of healthy foods  Healthy foods include fruits, vegetables, whole-grain breads, low-fat dairy products, beans, and fish  You may need to limit how much sodium (salt) or protein you eat  Ask for information about the best foods for you  · Stay active  Your stones may pass more easily if you stay active  Exercise can also help you manage your weight  Ask about the best activities for you  After you pass the kidney stones: Your healthcare provider may  order a 24-hour urine test  Results from a 24-hour urine test will help your healthcare provider plan ways to prevent more stones from forming  Your healthcare provider will give you more instructions  Follow up with your healthcare provider as directed:  Write down your questions so you remember to ask them during your visits  © Copyright [address] Information is for End User's use only and may not be sold, redistributed or otherwise used for commercial purposes  All illustrations and images included in CareNotes® are the copyrighted property of [employer]  or [facility] Phoebe Charles   The above information is an  only  It is not intended as medical advice for individual conditions or treatments   Talk to your doctor, nurse or pharmacist before following any medical regimen to see if it is safe and effective for you

## 2021-01-28 ENCOUNTER — TELEPHONE (OUTPATIENT)
Dept: NEPHROLOGY | Facility: CLINIC | Age: 46
End: 2021-01-28

## 2021-01-28 ENCOUNTER — HOSPITAL ENCOUNTER (OUTPATIENT)
Dept: RADIOLOGY | Age: 46
Discharge: HOME | End: 2021-01-28
Payer: COMMERCIAL

## 2021-01-28 DIAGNOSIS — N13.30 HYDRONEPHROSIS OF LEFT KIDNEY: ICD-10-CM

## 2021-01-28 DIAGNOSIS — N13.2 HYDRONEPHROSIS WITH RENAL AND URETERAL CALCULUS OBSTRUCTION: ICD-10-CM

## 2021-01-28 DIAGNOSIS — N20.0 NEPHROLITHIASIS: ICD-10-CM

## 2021-01-28 DIAGNOSIS — E87.1 HYPONATREMIA: ICD-10-CM

## 2021-01-28 PROCEDURE — 76770 US EXAM ABDO BACK WALL COMP: CPT

## 2021-01-28 NOTE — TELEPHONE ENCOUNTER
Patient called the office with some medication questions  She thinks there was a misunderstanding at the pharmacy with her potassium medication  If someone could please give her a call at [phone]

## 2021-01-29 NOTE — TELEPHONE ENCOUNTER
I spoke to the patient, she stated at her appointment Dr Flex Kirk wanted her to go back on the Potassium Citrate 20 meq daily, however the prescription sent to the pharmacy was the Potassium Chloride  She is verifying if there was an error  If so please sent correct prescription to the pharmacy [facility] in [place]

## 2021-01-31 DIAGNOSIS — N20.0 NEPHROLITHIASIS: Primary | ICD-10-CM

## 2021-01-31 RX ORDER — POTASSIUM CITRATE 10 MEQ/1
10 TABLET, EXTENDED RELEASE ORAL
Qty: 90 EACH | Refills: 3 | Status: SHIPPED | OUTPATIENT
Start: 2021-01-31 | End: 2021-02-03

## 2021-01-31 NOTE — TELEPHONE ENCOUNTER
I changed it sorry about that  I wrote a script from 10 meq tid, she can daily dosing and work up to three times a day if she has no GI symptoms  I will renew with larger quantity if she is tolerating  Thanks

## 2021-02-03 DIAGNOSIS — N20.0 NEPHROLITHIASIS: ICD-10-CM

## 2021-02-03 RX ORDER — POTASSIUM CITRATE 10 MEQ/1
10 TABLET, EXTENDED RELEASE ORAL 2 TIMES DAILY
Qty: 90 EACH | Refills: 0
Start: 2021-02-03 | End: 2022-02-18 | Stop reason: SDUPTHER

## 2021-02-03 NOTE — TELEPHONE ENCOUNTER
I spoke to the patient an she is aware her medication was sent to [address] [place] [address]

## 2021-02-07 ENCOUNTER — HOSPITAL ENCOUNTER (OUTPATIENT)
Dept: MAMMOGRAPHY | Facility: IMAGING CENTER | Age: 46
Discharge: HOME | End: 2021-02-07
Payer: COMMERCIAL

## 2021-02-07 VITALS — WEIGHT: 230 LBS | HEIGHT: 62 IN | BODY MASS INDEX: 42.33 KG/M2

## 2021-02-07 DIAGNOSIS — Z12.31 VISIT FOR SCREENING MAMMOGRAM: ICD-10-CM

## 2021-02-07 DIAGNOSIS — Z12.31 ENCOUNTER FOR SCREENING MAMMOGRAM FOR MALIGNANT NEOPLASM OF BREAST: ICD-10-CM

## 2021-02-07 PROCEDURE — 77063 BREAST TOMOSYNTHESIS BI: CPT

## 2021-02-07 PROCEDURE — 77067 SCR MAMMO BI INCL CAD: CPT

## 2021-03-01 ENCOUNTER — LAB (OUTPATIENT)
Dept: LAB | Facility: HOSPITAL | Age: 46
End: 2021-03-01
Attending: INTERNAL MEDICINE
Payer: COMMERCIAL

## 2021-03-01 DIAGNOSIS — N13.2 HYDRONEPHROSIS WITH RENAL AND URETERAL CALCULUS OBSTRUCTION: ICD-10-CM

## 2021-03-01 DIAGNOSIS — N20.0 NEPHROLITHIASIS: ICD-10-CM

## 2021-03-01 DIAGNOSIS — N13.30 HYDRONEPHROSIS OF LEFT KIDNEY: ICD-10-CM

## 2021-03-01 DIAGNOSIS — E87.1 HYPONATREMIA: ICD-10-CM

## 2021-03-01 LAB
ANION GAP SERPL CALCULATED.3IONS-SCNC: 3 MMOL/L (ref 4–13)
BACTERIA UR QL AUTO: ABNORMAL /HPF
BILIRUB UR QL STRIP: NEGATIVE
BUN SERPL-MCNC: 15 MG/DL (ref 5–25)
CALCIUM SERPL-MCNC: 9.3 MG/DL (ref 8.3–10.1)
CHLORIDE SERPL-SCNC: 103 MMOL/L (ref 100–108)
CLARITY UR: ABNORMAL
CO2 SERPL-SCNC: 34 MMOL/L (ref 21–32)
COLOR UR: YELLOW
CREAT SERPL-MCNC: 0.88 MG/DL (ref 0.6–1.3)
GFR SERPL CREATININE-BSD FRML MDRD: 81 ML/MIN/1.73SQ M
GLUCOSE SERPL-MCNC: 187 MG/DL (ref 65–140)
GLUCOSE UR STRIP-MCNC: NEGATIVE MG/DL
HGB UR QL STRIP.AUTO: ABNORMAL
HYALINE CASTS #/AREA URNS LPF: ABNORMAL /LPF
KETONES UR STRIP-MCNC: NEGATIVE MG/DL
LEUKOCYTE ESTERASE UR QL STRIP: ABNORMAL
MAGNESIUM SERPL-MCNC: 1.9 MG/DL (ref 1.6–2.6)
NITRITE UR QL STRIP: NEGATIVE
NON-SQ EPI CELLS URNS QL MICRO: ABNORMAL /HPF
PH UR STRIP.AUTO: 7 [PH]
POTASSIUM SERPL-SCNC: 3.8 MMOL/L (ref 3.5–5.3)
PROT UR STRIP-MCNC: NEGATIVE MG/DL
RBC #/AREA URNS AUTO: ABNORMAL /HPF
SODIUM SERPL-SCNC: 140 MMOL/L (ref 136–145)
SP GR UR STRIP.AUTO: 1.02 (ref 1–1.03)
UROBILINOGEN UR QL STRIP.AUTO: 0.2 E.U./DL
WBC #/AREA URNS AUTO: ABNORMAL /HPF

## 2021-03-01 PROCEDURE — 80048 BASIC METABOLIC PNL TOTAL CA: CPT

## 2021-03-01 PROCEDURE — 83945 ASSAY OF OXALATE: CPT | Performed by: INTERNAL MEDICINE

## 2021-03-01 PROCEDURE — 84300 ASSAY OF URINE SODIUM: CPT | Performed by: INTERNAL MEDICINE

## 2021-03-01 PROCEDURE — 83935 ASSAY OF URINE OSMOLALITY: CPT | Performed by: INTERNAL MEDICINE

## 2021-03-01 PROCEDURE — 82436 ASSAY OF URINE CHLORIDE: CPT | Performed by: INTERNAL MEDICINE

## 2021-03-01 PROCEDURE — 81003 URINALYSIS AUTO W/O SCOPE: CPT | Performed by: INTERNAL MEDICINE

## 2021-03-01 PROCEDURE — 82140 ASSAY OF AMMONIA: CPT | Performed by: INTERNAL MEDICINE

## 2021-03-01 PROCEDURE — 83735 ASSAY OF MAGNESIUM: CPT | Performed by: INTERNAL MEDICINE

## 2021-03-01 PROCEDURE — 83735 ASSAY OF MAGNESIUM: CPT

## 2021-03-01 PROCEDURE — 84105 ASSAY OF URINE PHOSPHORUS: CPT | Performed by: INTERNAL MEDICINE

## 2021-03-01 PROCEDURE — 82507 ASSAY OF CITRATE: CPT | Performed by: INTERNAL MEDICINE

## 2021-03-01 PROCEDURE — 82340 ASSAY OF CALCIUM IN URINE: CPT | Performed by: INTERNAL MEDICINE

## 2021-03-01 PROCEDURE — 82131 AMINO ACIDS SINGLE QUANT: CPT | Performed by: INTERNAL MEDICINE

## 2021-03-01 PROCEDURE — 84560 ASSAY OF URINE/URIC ACID: CPT | Performed by: INTERNAL MEDICINE

## 2021-03-01 PROCEDURE — 84392 ASSAY OF URINE SULFATE: CPT | Performed by: INTERNAL MEDICINE

## 2021-03-01 PROCEDURE — 81001 URINALYSIS AUTO W/SCOPE: CPT | Performed by: INTERNAL MEDICINE

## 2021-03-01 PROCEDURE — 36415 COLL VENOUS BLD VENIPUNCTURE: CPT

## 2021-03-01 PROCEDURE — 84133 ASSAY OF URINE POTASSIUM: CPT | Performed by: INTERNAL MEDICINE

## 2021-03-01 PROCEDURE — 82570 ASSAY OF URINE CREATININE: CPT | Performed by: INTERNAL MEDICINE

## 2021-03-03 ENCOUNTER — TELEPHONE (OUTPATIENT)
Dept: NEPHROLOGY | Facility: CLINIC | Age: 46
End: 2021-03-03

## 2021-03-03 NOTE — TELEPHONE ENCOUNTER
----- Message from Suzanna Rodriguez DO sent at 3/1/2021  1:36 PM EST -----  Please let her know that electrolytes and renal function are stable from most recent changes, if she is tolerating we can continue current medications

## 2021-03-03 NOTE — TELEPHONE ENCOUNTER
Spoke with pt and made her aware of stable lab results  She states often times she forgets to take her second dose of potassium citrate and is wondering since her K is stable if she'd be able to just decrease to 10 meq daily

## 2021-03-10 LAB
AMMONIA 24H UR-MRATE: 40 MEQ/24 HR
AMMONIA UR-SCNC: ABNORMAL UG/DL
CA H2 PHOS DIHYD CRY URNS QL MICRO: 5.45 RATIO (ref 0–3)
CALCIUM 24H UR-MCNC: 18.6 MG/DL
CALCIUM 24H UR-MRATE: 279 MG/24 HR (ref 100–300)
CHLORIDE 24H UR-SCNC: 90 MMOL/L
CHLORIDE 24H UR-SRATE: 135 MMOL/24 HR (ref 110–250)
CITRATE 24H UR-MCNC: 298 MG/L
CITRATE 24H UR-MRATE: 447 MG/24 HR (ref 320–1240)
COM CRY STONE QL IR: 8.04 RATIO (ref 0–6)
CREAT 24H UR-MCNC: 86.4 MG/DL
CREAT 24H UR-MRATE: 1296 MG/24 HR (ref 800–1800)
CYSTINE 24H UR-MCNC: 6.5 MG/L
CYSTINE 24H UR-MRATE: 9.75 MG/24 HR (ref 10–100)
MAGNESIUM 24H UR-MRATE: 114 MG/24 HR (ref 12–293)
MAGNESIUM UR-MCNC: 7.6 MG/DL
NA URATE CRY STONE QL IR: 5.54 RATIO (ref 0–4)
OSMOLALITY UR: 549 MOSMOL/KG (ref 300–900)
OXALATE 24H UR-MRATE: 29 MG/24 HR (ref 4–31)
OXALATE UR-MCNC: 19 MG/L
PH 24H UR: 6.7 [PH]
PHOSPHATE 24H UR-MCNC: 51.6 MG/DL
PHOSPHATE 24H UR-MRATE: 774 MG/24 HR (ref 400–1300)
PLEASE NOTE (STONE RISK): ABNORMAL
POTASSIUM 24H UR-SCNC: 57 MMOL/24 HR (ref 25–125)
POTASSIUM UR-SCNC: 38 MMOL/L
PRESERVED URINE: 1500 ML/24 HR (ref 600–1600)
SODIUM 24H UR-SCNC: 96 MMOL/L
SODIUM 24H UR-SRATE: 144 MMOL/24 HR (ref 39–258)
SPECIMEN VOL 24H UR: 1500 ML/24 HR (ref 600–1600)
SULFATE 24H UR-MCNC: 23 MEQ/24 HR (ref 0–30)
SULFATE UR-MCNC: 15 MEQ/L
TRI-PHOS CRY STONE MICRO: 0.39 RATIO (ref 0–1)
URATE 24H UR-MCNC: 48.7 MG/DL
URATE 24H UR-MRATE: 731 MG/24 HR (ref 250–750)
URATE DIHYD CRY STONE QL IR: 0.46 RATIO (ref 0–1.2)

## 2021-03-17 ENCOUNTER — TELEPHONE (OUTPATIENT)
Dept: NEPHROLOGY | Facility: CLINIC | Age: 46
End: 2021-03-17

## 2021-03-17 DIAGNOSIS — N20.0 NEPHROLITHIASIS: Primary | ICD-10-CM

## 2021-03-17 NOTE — TELEPHONE ENCOUNTER
----- Message from Abi Restrepo DO sent at 3/11/2021  3:28 PM EST -----  The 24 hour urine calcium is back down  Slightly undercollected specimen  THe Caclium/Oxalate ratio is trending upward though based on last few 24 hour urines  Can you send her a low oxalate diet guide  Thanks   Repeat a stone risk profile in 3 mon ths

## 2021-03-17 NOTE — TELEPHONE ENCOUNTER
Okay I called her I left a message on her answering machine okay to take calcium and vitamin-D supplementation let me know if she calls back thanks

## 2021-03-17 NOTE — TELEPHONE ENCOUNTER
I spoke to the patient and she is aware of her 24 hour urine test results  She said her concern is that not taking the calcium her calcium is now normal in her urine, but what does that mean for her bones when her OB/GYN wants her to take it for bone health  She wants to know why her kidneys are spilling the calcium, and what that means for the rest of her body  Should she take the calcium and just deal with it spilling into the urine  She is worried not taking calcium will cause other issues in her body  She is aware she needs a repeat 24 hour urine in 3 months and I mailed the order and the low oxalate diet information to the patient

## 2021-03-25 ENCOUNTER — IMMUNIZATIONS (OUTPATIENT)
Dept: FAMILY MEDICINE CLINIC | Facility: HOSPITAL | Age: 46
End: 2021-03-25

## 2021-03-25 DIAGNOSIS — Z23 ENCOUNTER FOR IMMUNIZATION: Primary | ICD-10-CM

## 2021-03-25 PROCEDURE — 91300 SARS-COV-2 / COVID-19 MRNA VACCINE (PFIZER-BIONTECH) 30 MCG: CPT

## 2021-03-25 PROCEDURE — 0001A SARS-COV-2 / COVID-19 MRNA VACCINE (PFIZER-BIONTECH) 30 MCG: CPT

## 2021-04-16 ENCOUNTER — IMMUNIZATIONS (OUTPATIENT)
Dept: FAMILY MEDICINE CLINIC | Facility: HOSPITAL | Age: 46
End: 2021-04-16

## 2021-04-16 DIAGNOSIS — Z23 ENCOUNTER FOR IMMUNIZATION: Primary | ICD-10-CM

## 2021-04-16 PROCEDURE — 0002A SARS-COV-2 / COVID-19 MRNA VACCINE (PFIZER-BIONTECH) 30 MCG: CPT

## 2021-04-16 PROCEDURE — 91300 SARS-COV-2 / COVID-19 MRNA VACCINE (PFIZER-BIONTECH) 30 MCG: CPT

## 2021-04-27 ENCOUNTER — TELEPHONE (OUTPATIENT)
Dept: NEPHROLOGY | Facility: CLINIC | Age: 46
End: 2021-04-27

## 2021-04-27 NOTE — TELEPHONE ENCOUNTER
Patient called asking if it is necessary for her to do a repeat stone risk profile as well as scheduling a follow up appointment; she would like to know before scheduling a follow up  Please advise

## 2021-04-27 NOTE — TELEPHONE ENCOUNTER
I spoke to the patient and explained the stone risk profile was because the previous stone risk was abnormal  We scheduled her follow up for August and she will get the profile done

## 2021-06-28 ENCOUNTER — OFFICE VISIT (OUTPATIENT)
Dept: FAMILY MEDICINE CLINIC | Facility: CLINIC | Age: 46
End: 2021-06-28
Payer: COMMERCIAL

## 2021-06-28 VITALS
TEMPERATURE: 98.1 F | RESPIRATION RATE: 18 BRPM | OXYGEN SATURATION: 96 % | HEIGHT: 62 IN | HEART RATE: 105 BPM | BODY MASS INDEX: 42.55 KG/M2 | SYSTOLIC BLOOD PRESSURE: 120 MMHG | DIASTOLIC BLOOD PRESSURE: 82 MMHG | WEIGHT: 231.2 LBS

## 2021-06-28 DIAGNOSIS — R05.9 COUGH: Primary | ICD-10-CM

## 2021-06-28 DIAGNOSIS — J06.9 URI, ACUTE: ICD-10-CM

## 2021-06-28 PROCEDURE — 99214 OFFICE O/P EST MOD 30 MIN: CPT | Performed by: NURSE PRACTITIONER

## 2021-06-28 RX ORDER — PREDNISONE 10 MG/1
TABLET ORAL
Qty: 20 TABLET | Refills: 0 | Status: SHIPPED | OUTPATIENT
Start: 2021-06-28 | End: 2021-08-23 | Stop reason: ALTCHOICE

## 2021-06-28 NOTE — PATIENT INSTRUCTIONS
Please take Prednisone taper with food  You may use Mucinex & Flonase as needed  You should start to feel better in the next 48-72 hours  If you develop fever, chills or have worsening of symptoms please contact our office

## 2021-06-28 NOTE — PROGRESS NOTES
Assessment/Plan:     Diagnoses and all orders for this visit:    Cough  -     predniSONE 10 mg tablet; Take 4 tablets with food on days 1 & 2  Then take 3 tablets with food on days 3 &4  Then take 2 tablets with food on days 5 & 6  Then take 1 tablet with food on days 7 & 8  Prednisone taper ordered for patient  Medication and s/e reviewed  She is to rest & intake fluids  Continue Mucinex & Flonase  If she develops shortness of breath to which she cannot finish a sentence without having to pause to take a breath between words she is urged to go to ER  Patient states they understand and agree with treatment plan  URI, acute      This appears to be resolving  Same as plan above  Contact our office after 48-72 hours if symptoms worsen or persist       Pt to f/u PRN  Subjective:      Patient ID: Leigha Martel is a 42 y o  female  Pt presents to office today for URI symptoms of semi-productive cough and feeling "echoey in the ears"  This started 2 weeks ago & patient notes she originally had lots of sinus congestion, which she states is no longer an issue  She denies fever, chills, NVD, ear pain or discharge  She admits to using Flonase, Mucinex, Xyzal, Claritin, Vicks to help with her symptoms  Today, she feels slightly better, but notes her cough is relentless  She wears a mask at work all day and notes some shortness of breath here and there r/t the coughing  Pt fully covid vaccinated back in April 2021  The following portions of the patient's history were reviewed and updated as appropriate: allergies, current medications, past family history, past medical history, past social history, past surgical history and problem list     Review of Systems   Constitutional: Positive for fatigue  Negative for chills and fever  HENT: Positive for congestion (nasal), postnasal drip and rhinorrhea   Negative for ear discharge, ear pain, facial swelling, hearing loss, sinus pressure, sinus pain, sore throat and trouble swallowing  Eyes: Negative for pain and visual disturbance  Respiratory: Positive for cough (semi-productive, white thick mucus) and shortness of breath (only on occasion during coughing spell)  Negative for wheezing  Cardiovascular: Negative  Negative for chest pain and palpitations  Gastrointestinal: Negative  Negative for abdominal distention, abdominal pain, constipation, diarrhea, nausea and vomiting  Genitourinary: Negative  Negative for dysuria and hematuria  Musculoskeletal: Negative  Negative for arthralgias, back pain and myalgias  Skin: Negative for color change and rash  Neurological: Negative  Negative for dizziness, seizures, syncope and headaches  Psychiatric/Behavioral: Negative  All other systems reviewed and are negative  Objective:      /82 (BP Location: Left arm, Patient Position: Sitting, Cuff Size: Large)   Pulse 105   Temp 98 1 °F (36 7 °C) (Temporal)   Resp 18   Ht 5' 2" (1 575 m)   Wt 105 kg (231 lb 3 2 oz)   SpO2 96%   BMI 42 29 kg/m²          Physical Exam  Vitals reviewed  Constitutional:       General: She is not in acute distress  Appearance: Normal appearance  She is not ill-appearing  HENT:      Head: Normocephalic  Right Ear: Hearing, tympanic membrane, ear canal and external ear normal       Left Ear: Hearing, tympanic membrane, ear canal and external ear normal       Nose: Congestion and rhinorrhea present  Rhinorrhea is clear  Right Turbinates: Enlarged  Not pale  Left Turbinates: Enlarged  Not pale  Right Sinus: No maxillary sinus tenderness or frontal sinus tenderness  Left Sinus: No maxillary sinus tenderness or frontal sinus tenderness  Mouth/Throat:      Mouth: Mucous membranes are moist       Pharynx: Oropharynx is clear  No pharyngeal swelling, oropharyngeal exudate or posterior oropharyngeal erythema  Neck:      Vascular: No carotid bruit     Cardiovascular:      Rate and Rhythm: Normal rate and regular rhythm  Pulses: Normal pulses  Heart sounds: Normal heart sounds  No murmur heard  Pulmonary:      Effort: Pulmonary effort is normal  No respiratory distress  Breath sounds: Normal breath sounds  No wheezing  Musculoskeletal:      Cervical back: Normal range of motion  Lymphadenopathy:      Cervical: No cervical adenopathy  Skin:     General: Skin is warm and dry  Neurological:      Mental Status: She is alert and oriented to person, place, and time  Mental status is at baseline  Psychiatric:         Mood and Affect: Mood normal          Behavior: Behavior normal          Thought Content:  Thought content normal          Judgment: Judgment normal

## 2021-08-03 ENCOUNTER — APPOINTMENT (OUTPATIENT)
Dept: LAB | Facility: HOSPITAL | Age: 46
End: 2021-08-03
Attending: INTERNAL MEDICINE
Payer: COMMERCIAL

## 2021-08-03 DIAGNOSIS — Z00.8 HEALTH EXAMINATION IN POPULATION SURVEY: Primary | ICD-10-CM

## 2021-08-03 LAB
CHOLEST SERPL-MCNC: 155 MG/DL (ref 50–200)
EST. AVERAGE GLUCOSE BLD GHB EST-MCNC: 157 MG/DL
HBA1C MFR BLD: 7.1 %
HDLC SERPL-MCNC: 42 MG/DL
LDLC SERPL CALC-MCNC: 92 MG/DL (ref 0–100)
NONHDLC SERPL-MCNC: 113 MG/DL
TRIGL SERPL-MCNC: 103 MG/DL

## 2021-08-03 PROCEDURE — 83036 HEMOGLOBIN GLYCOSYLATED A1C: CPT

## 2021-08-03 PROCEDURE — 83935 ASSAY OF URINE OSMOLALITY: CPT

## 2021-08-03 PROCEDURE — 82436 ASSAY OF URINE CHLORIDE: CPT

## 2021-08-03 PROCEDURE — 82140 ASSAY OF AMMONIA: CPT

## 2021-08-03 PROCEDURE — 84392 ASSAY OF URINE SULFATE: CPT

## 2021-08-03 PROCEDURE — 84133 ASSAY OF URINE POTASSIUM: CPT

## 2021-08-03 PROCEDURE — 84105 ASSAY OF URINE PHOSPHORUS: CPT

## 2021-08-03 PROCEDURE — 82131 AMINO ACIDS SINGLE QUANT: CPT

## 2021-08-03 PROCEDURE — 84300 ASSAY OF URINE SODIUM: CPT

## 2021-08-03 PROCEDURE — 82507 ASSAY OF CITRATE: CPT

## 2021-08-03 PROCEDURE — 80061 LIPID PANEL: CPT

## 2021-08-03 PROCEDURE — 82570 ASSAY OF URINE CREATININE: CPT

## 2021-08-03 PROCEDURE — 83735 ASSAY OF MAGNESIUM: CPT

## 2021-08-03 PROCEDURE — 81003 URINALYSIS AUTO W/O SCOPE: CPT

## 2021-08-03 PROCEDURE — 36415 COLL VENOUS BLD VENIPUNCTURE: CPT

## 2021-08-03 PROCEDURE — 83945 ASSAY OF OXALATE: CPT

## 2021-08-03 PROCEDURE — 84560 ASSAY OF URINE/URIC ACID: CPT

## 2021-08-03 PROCEDURE — 82340 ASSAY OF CALCIUM IN URINE: CPT

## 2021-08-13 LAB
AMMONIA 24H UR-MRATE: 32 MEQ/24 HR
AMMONIA UR-SCNC: ABNORMAL UG/DL
CA H2 PHOS DIHYD CRY URNS QL MICRO: 3.08 RATIO (ref 0–3)
CALCIUM 24H UR-MCNC: 11.1 MG/DL
CALCIUM 24H UR-MRATE: 255.3 MG/24 HR (ref 100–300)
CHLORIDE 24H UR-SCNC: 70 MMOL/L
CHLORIDE 24H UR-SRATE: 161 MMOL/24 HR (ref 110–250)
CITRATE 24H UR-MCNC: 309 MG/L
CITRATE 24H UR-MRATE: 711 MG/24 HR (ref 320–1240)
COM CRY STONE QL IR: 3.92 RATIO (ref 0–6)
CREAT 24H UR-MCNC: 73.3 MG/DL
CREAT 24H UR-MRATE: 1685.9 MG/24 HR (ref 800–1800)
CYSTINE 24H UR-MCNC: 6.56 MG/L
CYSTINE 24H UR-MRATE: 15.09 MG/24 HR (ref 10–100)
MAGNESIUM 24H UR-MRATE: 94 MG/24 HR (ref 12–293)
MAGNESIUM UR-MCNC: 4.1 MG/DL
NA URATE CRY STONE QL IR: 4.37 RATIO (ref 0–4)
OSMOLALITY UR: 432 MOSMOL/KG (ref 300–900)
OXALATE 24H UR-MRATE: 30 MG/24 HR (ref 4–31)
OXALATE UR-MCNC: 13 MG/L
PH 24H UR: 7 [PH]
PHOSPHATE 24H UR-MCNC: 39.8 MG/DL
PHOSPHATE 24H UR-MRATE: 915.4 MG/24 HR (ref 400–1300)
PLEASE NOTE (STONE RISK): ABNORMAL
POTASSIUM 24H UR-SCNC: 55 MMOL/24 HR (ref 25–125)
POTASSIUM UR-SCNC: 23.9 MMOL/L
PRESERVED URINE: 2300 ML/24 HR (ref 600–1600)
SODIUM 24H UR-SCNC: 96 MMOL/L
SODIUM 24H UR-SRATE: 221 MMOL/24 HR (ref 39–258)
SPECIMEN VOL 24H UR: 2300 ML/24 HR (ref 600–1600)
SULFATE 24H UR-MCNC: 30 MEQ/24 HR (ref 0–30)
SULFATE UR-MCNC: 13 MEQ/L
TRI-PHOS CRY STONE MICRO: 0.2 RATIO (ref 0–1)
URATE 24H UR-MCNC: 37.7 MG/DL
URATE 24H UR-MRATE: 867 MG/24 HR (ref 250–750)
URATE DIHYD CRY STONE QL IR: 0.18 RATIO (ref 0–1.2)

## 2021-08-16 ENCOUNTER — TELEPHONE (OUTPATIENT)
Dept: NEPHROLOGY | Facility: HOSPITAL | Age: 46
End: 2021-08-16

## 2021-08-16 NOTE — TELEPHONE ENCOUNTER
----- Message from Sammy Mcfadden DO sent at 8/16/2021 12:32 PM EDT -----  24 hour stone risk profile reviewed relatively good urine volume 24 hour urine creatinine indicates fairly consistant/adequate collection  Urine calcium has improved and oxalate concentration has improved  No changes to current medications if she f  eels well  Thanks

## 2021-08-16 NOTE — TELEPHONE ENCOUNTER
I spoke to the patient and she is aware her urine volume is adequate, and she is aware her oxalate and calcium has improved  She asked to cancel her August appointment but decided to reschedule for February  Patient was placed on the recall list for February

## 2021-08-23 ENCOUNTER — OFFICE VISIT (OUTPATIENT)
Dept: FAMILY MEDICINE CLINIC | Facility: CLINIC | Age: 46
End: 2021-08-23
Payer: COMMERCIAL

## 2021-08-23 VITALS
HEIGHT: 62 IN | SYSTOLIC BLOOD PRESSURE: 126 MMHG | WEIGHT: 231.2 LBS | BODY MASS INDEX: 42.55 KG/M2 | RESPIRATION RATE: 16 BRPM | HEART RATE: 72 BPM | DIASTOLIC BLOOD PRESSURE: 70 MMHG

## 2021-08-23 DIAGNOSIS — E66.01 MORBID OBESITY WITH BMI OF 40.0-44.9, ADULT (HCC): ICD-10-CM

## 2021-08-23 DIAGNOSIS — E11.9 TYPE 2 DIABETES MELLITUS WITHOUT COMPLICATION, WITHOUT LONG-TERM CURRENT USE OF INSULIN (HCC): ICD-10-CM

## 2021-08-23 DIAGNOSIS — R60.0 BILATERAL LOWER EXTREMITY EDEMA: Primary | ICD-10-CM

## 2021-08-23 PROCEDURE — 99214 OFFICE O/P EST MOD 30 MIN: CPT | Performed by: NURSE PRACTITIONER

## 2021-08-23 NOTE — PROGRESS NOTES
Assessment/Plan:     Diagnoses and all orders for this visit:    Bilateral lower extremity edema    Pt to continue limited dietary salt intake, elevation of LEs  She may also utilize rick compression stocking to help with swelling  Since edema is already improving we will have her continue to monitor  If she notices worsening swelling, pt to contact our office  Patient states they understand and agree with treatment plan  Type 2 diabetes mellitus without complication, without long-term current use of insulin ([facility] )    Ha1c on 08/03/21 at 7 1%  Last Ha1c at 6 5% on 03/2019  Pt notes she has gained weight over the last several months  Pt is reluctant to start medication but does not want her Ha1c to be out of control  We will have her be diligent about diet & exercise  We will have her RTO in 3 months and repeat Ha1c  If still 7 1 or worse, we will consider Metformin BID  Pt is in agreement with this  Diabetes education referral placed  Morbid obesity with BMI of 40 0-44 9, adult ([facility] )    We discussed appropriate lifestyle recommendations & goal BMI range  Pt was educated on appropriate diet and exercise modifications  Pt to f/u in 3 months for Ha1c recheck or sooner PRN  Subjective:      Patient ID: Beto Santos is a 42 y o  female  Pt presents for rick leg swelling that started last week  She notes at first it was her left LE and over the weekend her right LE was slightly swollen  Today, she notes that the swelling rick has improved  She denies any heavy salt intake, no pain, redness, erythema on rick LEs  Pt denies hx of blood clots  She does note improvement with elevation of LEs & states she has been compliant with her HCTZ  Also, 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 at 7 1% and she is concerned  She knows she put on some weight and has not been eating well  Her kids just started back with school and she is hopeful to get back on track        The following portions of the patient's history were reviewed and updated as appropriate: allergies, current medications, past family history, past medical history, past social history, past surgical history and problem list     Review of Systems   Constitutional: Negative for chills and fever  HENT: Negative for ear pain and sore throat  Eyes: Negative for pain and visual disturbance  Respiratory: Negative for cough and shortness of breath  Cardiovascular: Positive for leg swelling (occasional, improved today, trace swelling FREEMAN)  Negative for chest pain and palpitations  Gastrointestinal: Negative for abdominal pain and vomiting  Genitourinary: Negative for dysuria and hematuria  Musculoskeletal: Negative for arthralgias and back pain  Skin: Negative for color change and rash  Neurological: Negative for seizures and syncope  All other systems reviewed and are negative  Objective:      /70 (BP Location: Left arm, Patient Position: Sitting, Cuff Size: Large)   Pulse 72   Resp 16   Ht 5' 1 75" (1 568 m)   Wt 105 kg (231 lb 3 2 oz)   Breastfeeding No   BMI 42 63 kg/m²          Physical Exam  Vitals reviewed  Constitutional:       General: She is not in acute distress  Appearance: Normal appearance  She is obese  She is not ill-appearing  HENT:      Head: Normocephalic  Cardiovascular:      Rate and Rhythm: Normal rate and regular rhythm  Pulses:           Dorsalis pedis pulses are 2+ on the right side and 2+ on the left side  Posterior tibial pulses are 2+ on the right side and 2+ on the left side  Pulmonary:      Effort: Pulmonary effort is normal    Musculoskeletal:         General: Normal range of motion  Right lower leg: Edema (trace, no erythema or heat) present  Left lower leg: Edema (trace, no erythema or heat) present     Feet:      Right foot:      Skin integrity: Skin integrity normal       Left foot:      Skin integrity: Skin integrity normal    Skin:     General: Skin is warm and dry  Capillary Refill: Capillary refill takes less than 2 seconds  Neurological:      General: No focal deficit present  Mental Status: She is alert and oriented to person, place, and time  Mental status is at baseline  Psychiatric:         Mood and Affect: Mood normal          Behavior: Behavior normal          Thought Content: Thought content normal          Judgment: Judgment normal          BMI Counseling: Body mass index is 42 63 kg/m²  The BMI is above normal  Nutrition recommendations include reducing portion sizes, decreasing overall calorie intake, 3-5 servings of fruits/vegetables daily, reducing fast food intake, consuming healthier snacks, decreasing soda and/or juice intake and moderation in carbohydrate intake  Exercise recommendations include moderate aerobic physical activity for 150 minutes/week

## 2021-11-19 ENCOUNTER — OFFICE VISIT (OUTPATIENT)
Dept: FAMILY MEDICINE CLINIC | Facility: CLINIC | Age: 46
End: 2021-11-19
Payer: COMMERCIAL

## 2021-11-19 VITALS
HEART RATE: 97 BPM | RESPIRATION RATE: 16 BRPM | DIASTOLIC BLOOD PRESSURE: 74 MMHG | BODY MASS INDEX: 43.28 KG/M2 | HEIGHT: 62 IN | WEIGHT: 235.2 LBS | SYSTOLIC BLOOD PRESSURE: 116 MMHG

## 2021-11-19 DIAGNOSIS — E11.9 TYPE 2 DIABETES MELLITUS WITHOUT COMPLICATION, WITHOUT LONG-TERM CURRENT USE OF INSULIN (HCC): Primary | ICD-10-CM

## 2021-11-19 PROBLEM — R73.09 ELEVATED GLUCOSE LEVEL: Status: RESOLVED | Noted: 2017-06-21 | Resolved: 2021-11-19

## 2021-11-19 LAB — SL AMB POCT HEMOGLOBIN AIC: 7.6 (ref ?–6.5)

## 2021-11-19 PROCEDURE — 99214 OFFICE O/P EST MOD 30 MIN: CPT | Performed by: NURSE PRACTITIONER

## 2021-11-19 PROCEDURE — 83036 HEMOGLOBIN GLYCOSYLATED A1C: CPT | Performed by: NURSE PRACTITIONER

## 2021-12-09 DIAGNOSIS — E11.9 TYPE 2 DIABETES MELLITUS WITHOUT COMPLICATION, WITHOUT LONG-TERM CURRENT USE OF INSULIN (HCC): Primary | ICD-10-CM

## 2021-12-09 RX ORDER — METFORMIN HYDROCHLORIDE 500 MG/1
500 TABLET, EXTENDED RELEASE ORAL
Qty: 30 TABLET | Refills: 1 | Status: SHIPPED | OUTPATIENT
Start: 2021-12-09 | End: 2022-02-03 | Stop reason: ALTCHOICE

## 2021-12-15 ENCOUNTER — TELEPHONE (OUTPATIENT)
Dept: NEPHROLOGY | Facility: CLINIC | Age: 46
End: 2021-12-15

## 2021-12-15 DIAGNOSIS — N13.30 HYDRONEPHROSIS OF LEFT KIDNEY: ICD-10-CM

## 2021-12-15 DIAGNOSIS — N20.0 NEPHROLITHIASIS: Primary | ICD-10-CM

## 2022-01-04 ENCOUNTER — IMMUNIZATIONS (OUTPATIENT)
Dept: FAMILY MEDICINE CLINIC | Facility: HOSPITAL | Age: 47
End: 2022-01-04

## 2022-01-04 DIAGNOSIS — Z23 ENCOUNTER FOR IMMUNIZATION: Primary | ICD-10-CM

## 2022-01-04 PROCEDURE — 91300 COVID-19 PFIZER VACC 0.3 ML: CPT

## 2022-01-04 PROCEDURE — 0001A COVID-19 PFIZER VACC 0.3 ML: CPT

## 2022-01-19 ENCOUNTER — ANNUAL EXAM (OUTPATIENT)
Dept: OBGYN CLINIC | Facility: CLINIC | Age: 47
End: 2022-01-19
Payer: COMMERCIAL

## 2022-01-19 VITALS
DIASTOLIC BLOOD PRESSURE: 70 MMHG | WEIGHT: 240.8 LBS | HEIGHT: 62 IN | SYSTOLIC BLOOD PRESSURE: 100 MMHG | BODY MASS INDEX: 44.31 KG/M2

## 2022-01-19 DIAGNOSIS — Z01.419 ENCOUNTER FOR GYNECOLOGICAL EXAMINATION WITHOUT ABNORMAL FINDING: Primary | ICD-10-CM

## 2022-01-19 DIAGNOSIS — Z12.31 ENCOUNTER FOR SCREENING MAMMOGRAM FOR BREAST CANCER: ICD-10-CM

## 2022-01-19 PROCEDURE — S0612 ANNUAL GYNECOLOGICAL EXAMINA: HCPCS | Performed by: NURSE PRACTITIONER

## 2022-01-19 NOTE — PROGRESS NOTES
Assessment / Plan    1  Encounter for gynecological examination without abnormal finding  Normal well woman exam  2021 pap/hpv negative  Plan repeat   [facility]     2  Encounter for screening mammogram for breast cancer  Order provided for update    - Mammo screening bilateral w 3d & cad; Future        Subjective      Jagruti Rubio is a 42 y o  female who presents for her annual gynecologic exam     41 yo   presents for routine gyn exam   2021 p/h negative  2021 mammo negative    Stopped her metformin due to significant GI side effects  In process of switching to a different endocrinologist   She is also planning to meet with [facility]  Periods are irregular  Current contraception: tubal ligation  History of abnormal Pap smear: no  Family history of breast,uterine, ovarian or colon cancer: yes - paternal half aunt, endometrial cancer      Menstrual History:  OB History        4    Para   2    Term   2            AB   2    Living   2       SAB   2    IAB        Ectopic        Multiple        Live Births               Obstetric Comments   Menarche: 14    Menses: 30/7d /maxipad every 24 hours for 2 days, and then regular tampon every 2 hours x 2 days and then maxipad once daily thereafter              Patient's last menstrual period was 2022  The following portions of the patient's history were reviewed and updated as appropriate: allergies, current medications, past family history, past medical history, past social history, past surgical history and problem list     Review of Systems      Review of Systems   Constitutional: Negative for chills and fever  Respiratory: Negative for cough and shortness of breath  Gastrointestinal: Negative for abdominal distention, abdominal pain, blood in stool, constipation, diarrhea, nausea and vomiting     Genitourinary: Negative for difficulty urinating, dysuria, frequency, genital sores, hematuria, menstrual problem, pelvic pain, urgency, vaginal bleeding and vaginal discharge  Musculoskeletal: Negative for arthralgias and myalgias  Breasts:  Negative for skin changes, dimpling, asymmetry, nipple discharge, redness, tenderness or palpable masses    Objective      /70 (BP Location: Left arm, Patient Position: Sitting, Cuff Size: Standard)   Ht 5' 2" (1 575 m)   Wt 109 kg (240 lb 12 8 oz)   LMP 01/03/2022   BMI 44 04 kg/m²      Physical Exam  Constitutional:       General: She is not in acute distress  Appearance: Normal appearance  She is well-developed  She is obese  She is not ill-appearing or diaphoretic  HENT:      Head: Normocephalic and atraumatic  Eyes:      Pupils: Pupils are equal, round, and reactive to light  Neck:      Thyroid: No thyromegaly  Pulmonary:      Effort: Pulmonary effort is normal    Chest:   Breasts: Breasts are symmetrical       Right: No inverted nipple, mass, nipple discharge, skin change, tenderness or supraclavicular adenopathy  Left: No inverted nipple, mass, nipple discharge, skin change, tenderness or supraclavicular adenopathy  Abdominal:      General: There is no distension  Palpations: Abdomen is soft  There is no mass  Tenderness: There is no abdominal tenderness  There is no guarding or rebound  Genitourinary:     General: Normal vulva  Exam position: Lithotomy position  Labia:         Right: No rash, tenderness, lesion or injury  Left: No rash, tenderness, lesion or injury  Vagina: No signs of injury and foreign body  No vaginal discharge, erythema, tenderness or bleeding  Cervix: No cervical motion tenderness, discharge or friability  Uterus: Not enlarged and not tender  Adnexa:         Right: No mass, tenderness or fullness  Left: No mass, tenderness or fullness          Rectum: Normal       Comments:   Exam limited by habitus    Musculoskeletal:      Cervical back: Neck supple  Lymphadenopathy:      Cervical: No cervical adenopathy  Upper Body:      Right upper body: No supraclavicular adenopathy  Left upper body: No supraclavicular adenopathy  Skin:     General: Skin is warm and dry  Neurological:      General: No focal deficit present  Mental Status: She is alert and oriented to person, place, and time  She is not disoriented  Psychiatric:         Mood and Affect: Mood normal          Behavior: Behavior normal          Thought Content:  Thought content normal          Judgment: Judgment normal

## 2022-02-03 ENCOUNTER — CONSULT (OUTPATIENT)
Dept: BARIATRICS | Facility: CLINIC | Age: 47
End: 2022-02-03
Payer: COMMERCIAL

## 2022-02-03 VITALS
OXYGEN SATURATION: 93 % | TEMPERATURE: 98 F | DIASTOLIC BLOOD PRESSURE: 78 MMHG | HEIGHT: 62 IN | HEART RATE: 101 BPM | SYSTOLIC BLOOD PRESSURE: 128 MMHG | WEIGHT: 238.5 LBS | BODY MASS INDEX: 43.89 KG/M2

## 2022-02-03 DIAGNOSIS — R63.5 ABNORMAL WEIGHT GAIN: ICD-10-CM

## 2022-02-03 DIAGNOSIS — E11.9 TYPE 2 DIABETES MELLITUS WITHOUT COMPLICATION, WITHOUT LONG-TERM CURRENT USE OF INSULIN (HCC): ICD-10-CM

## 2022-02-03 DIAGNOSIS — N20.0 NEPHROLITHIASIS: ICD-10-CM

## 2022-02-03 PROCEDURE — 99244 OFF/OP CNSLTJ NEW/EST MOD 40: CPT | Performed by: INTERNAL MEDICINE

## 2022-02-03 NOTE — PATIENT INSTRUCTIONS
Try these alternative food options:  Protein shakes- Premier, Pure protein, Fairlife (30g or 42g protein), Ensure Max Protein  Lactose free protein shakes-  Ripple, Iconic, Penn  Protein bars- Quest, Pure protein  Ice cream- Yasso, Enlightened or Halo Top   Chips- Quest protein chips, Cheese crisps   Bread- 647 wheat, Keto bread, whole wheat daniella bread  Pasta- Chickpea pasta, Pasta zero or similar  Rice- Cauliflower rice, quinoa, brown rice  Fruits- berries, cantaloupe,peaches, apples, oranges  Yogurt- Ratio (25g protein), Two good, Chobani 100 haley, Oikos  Sugar alternatives- Stevia, Monk fruit, Swerve      Goals:  Do not skip any meals! Food log (ie ) www myfitnesspal com,sparkpeople  com,loseit com,calorieking  com,etc  baritastic (use skinnytaste  com or smartphone adam [employer] Monitoring for recipes)  No sugary beverages  At least 64oz of water daily  Increase physical activity by 10 minutes daily   Gradually increase physical activity to a goal of 5 days per week for 30 minutes of MODERATE intensity PLUS 2 days per week of FULL BODY resistance training (use smartphone apps Nanalysis, Home Workout, etc )  Goal protein 90g per day  Goal carbohydrates 90g per day   [id number]

## 2022-02-17 ENCOUNTER — OFFICE VISIT (OUTPATIENT)
Dept: BARIATRICS | Facility: CLINIC | Age: 47
End: 2022-02-17

## 2022-02-17 VITALS — BODY MASS INDEX: 43.45 KG/M2 | WEIGHT: 236.1 LBS | HEIGHT: 62 IN

## 2022-02-17 DIAGNOSIS — R63.5 ABNORMAL WEIGHT GAIN: ICD-10-CM

## 2022-02-17 PROCEDURE — WMPFE WEIGHT MANAGEMENT PRO FEE EMPLOYEE: Performed by: DIETITIAN, REGISTERED

## 2022-02-17 PROCEDURE — RECHECK: Performed by: DIETITIAN, REGISTERED

## 2022-02-17 NOTE — PROGRESS NOTES
Weight Management Medical Nutrition Assessment  Felicia Company presented for the New Start session with the [facility]   Today's weight is 236 1#   Per dietary recall patient consumes excess calories from larger portions at dinner meal and snacking on salty carbs in the evening  She is currently not food logging and her physical activity level is low  We developed and reviewed a low calorie meal plan  Patient seen by Medical Provider in past 6 months:  yes  Requested to schedule appointment with Medical Provider: No      Anthropometric Measurements  Start Weight (#): 236 1#   Current Weight (#): n/a  TBW % Change from start weight:n/a  Ideal Body Weight (#):110#  Goal Weight (#):STG: under 200#     Weight Loss History  Previous weight loss attempts: Commercial Programs (Weight Watchers, Nilda Mini, etc )  Self Created Diets (Portion Control, Healthy Food Choices, etc )    Food and Nutrition Related History      Part-time SDS 2x     Food Recall  Breakfast:Hot Tea with honey - unmeasured   2x Toast with PB - unmeasured  Snack:skip   Lunch:Leftovers - smaller portions  Snack:granola bar - [employer] chocolate   Dinner:Lean protein/rice- large portions  / Marvetta Rocha -small portions  Snack:pretzels/ chips       Beverages: water  Volume of beverage intake: 60oz    Weekends: Same  Cravings: salty carbs/ crunchy   Trouble area of day:night snacking    Frequency of Eating out: every 3 days  Food restrictions:none reported  Cooking: self   Food Shopping: self    Physical Activity Intake  Activity:none - would walk - has a treadmill at home   Frequency:rarely  Physical limitations/barriers to exercise: none reported    Estimated Needs  Energy  Bear Fabiana Energy Needs:  BMR : 1674 calories   1-2# loss weekly sedentary:  6867-1349 calories              1-2# loss weekly lightly active:3200-2985 calories   Maintenance calories for sedentary activity level: 2008 calories   Protein: 60-75gm    (1 2-1 5g/kg IBW)  Fluid: 58oz     (35mL/kg IBW)    Nutrition Diagnosis  Yes;     Overweight/obesity  related to Excess energy intake as evidenced by  BMI more than normative standard for age and sex (obesity-grade III 36+)       Nutrition Intervention    Nutrition Prescription  Calories:1200 calories on sedentary days and flex to 1400 calories on walk days  Protein:60-785gm  Fluid:58oz    Meal Plan (Ortega/Pro/Carb)  Breakfast: 200-300/20/30  Snack:  Lunch: 300-400/30/30-45  Snack: 100-150/>5/20  Dinner: 300-400/30/30-45  Snack: 100-150/>5/20  Snack: 100-150/>5/20    Nutrition Education:    Healthy Core Manual  Calorie controlled menu  Lean protein food choices  Healthy snack options  Food journaling tips      Nutrition Counseling:  Strategies: meal planning, portion sizes, healthy snack choices, hydration, fiber intake, protein intake, exercise, food journal      Monitoring and Evaluation:  Evaluation criteria:  Energy Intake  Meet protein needs  Maintain adequate hydration  Monitor weekly weight  Meal planning/preparation  Food journal   Decreased portions at mealtimes and snacks  Physical activity     Barriers to learning:none  Readiness to change: Preparation:  (Getting ready to change)   Comprehension: good  Expected Compliance: good

## 2022-02-18 DIAGNOSIS — N20.0 NEPHROLITHIASIS: ICD-10-CM

## 2022-02-18 DIAGNOSIS — N13.2 HYDRONEPHROSIS WITH RENAL AND URETERAL CALCULUS OBSTRUCTION: ICD-10-CM

## 2022-02-18 DIAGNOSIS — E87.1 HYPONATREMIA: ICD-10-CM

## 2022-02-18 DIAGNOSIS — N13.30 HYDRONEPHROSIS OF LEFT KIDNEY: ICD-10-CM

## 2022-02-18 RX ORDER — HYDROCHLOROTHIAZIDE 25 MG/1
25 TABLET ORAL DAILY
Qty: 90 TABLET | Refills: 3 | Status: SHIPPED | OUTPATIENT
Start: 2022-02-18

## 2022-02-18 RX ORDER — POTASSIUM CITRATE 10 MEQ/1
10 TABLET, EXTENDED RELEASE ORAL 2 TIMES DAILY
Start: 2022-02-18 | End: 2022-02-21 | Stop reason: SDUPTHER

## 2022-02-18 NOTE — TELEPHONE ENCOUNTER
Pt called the office in regards to medication refill, pt stated she changed her appointment for a later date and is going to need a refill on Hydrochlorothiazide and Potassium Cirate I verified the patient's pharmacy on file  Refill sent

## 2022-02-21 ENCOUNTER — APPOINTMENT (OUTPATIENT)
Dept: LAB | Facility: HOSPITAL | Age: 47
End: 2022-02-21
Attending: INTERNAL MEDICINE
Payer: COMMERCIAL

## 2022-02-21 DIAGNOSIS — N13.30 HYDRONEPHROSIS OF LEFT KIDNEY: ICD-10-CM

## 2022-02-21 DIAGNOSIS — N20.0 NEPHROLITHIASIS: ICD-10-CM

## 2022-02-21 LAB
ANION GAP SERPL CALCULATED.3IONS-SCNC: 5 MMOL/L (ref 4–13)
BUN SERPL-MCNC: 19 MG/DL (ref 5–25)
CALCIUM SERPL-MCNC: 9.2 MG/DL (ref 8.3–10.1)
CHLORIDE SERPL-SCNC: 97 MMOL/L (ref 100–108)
CO2 SERPL-SCNC: 35 MMOL/L (ref 21–32)
CREAT SERPL-MCNC: 0.91 MG/DL (ref 0.6–1.3)
GFR SERPL CREATININE-BSD FRML MDRD: 76 ML/MIN/1.73SQ M
GLUCOSE P FAST SERPL-MCNC: 127 MG/DL (ref 65–99)
MAGNESIUM SERPL-MCNC: 1.8 MG/DL (ref 1.6–2.6)
POTASSIUM SERPL-SCNC: 4 MMOL/L (ref 3.5–5.3)
SODIUM SERPL-SCNC: 137 MMOL/L (ref 136–145)

## 2022-02-21 PROCEDURE — 36415 COLL VENOUS BLD VENIPUNCTURE: CPT

## 2022-02-21 PROCEDURE — 82340 ASSAY OF CALCIUM IN URINE: CPT

## 2022-02-21 PROCEDURE — 83935 ASSAY OF URINE OSMOLALITY: CPT

## 2022-02-21 PROCEDURE — 84560 ASSAY OF URINE/URIC ACID: CPT

## 2022-02-21 PROCEDURE — 81001 URINALYSIS AUTO W/SCOPE: CPT

## 2022-02-21 PROCEDURE — 83735 ASSAY OF MAGNESIUM: CPT

## 2022-02-21 PROCEDURE — 82570 ASSAY OF URINE CREATININE: CPT

## 2022-02-21 PROCEDURE — 84105 ASSAY OF URINE PHOSPHORUS: CPT

## 2022-02-21 PROCEDURE — 83945 ASSAY OF OXALATE: CPT

## 2022-02-21 PROCEDURE — 84133 ASSAY OF URINE POTASSIUM: CPT

## 2022-02-21 PROCEDURE — 82131 AMINO ACIDS SINGLE QUANT: CPT

## 2022-02-21 PROCEDURE — 81003 URINALYSIS AUTO W/O SCOPE: CPT

## 2022-02-21 PROCEDURE — 80048 BASIC METABOLIC PNL TOTAL CA: CPT

## 2022-02-21 PROCEDURE — 82436 ASSAY OF URINE CHLORIDE: CPT

## 2022-02-21 PROCEDURE — 82140 ASSAY OF AMMONIA: CPT

## 2022-02-21 PROCEDURE — 84392 ASSAY OF URINE SULFATE: CPT

## 2022-02-21 PROCEDURE — 84300 ASSAY OF URINE SODIUM: CPT

## 2022-02-21 PROCEDURE — 82507 ASSAY OF CITRATE: CPT

## 2022-02-21 NOTE — TELEPHONE ENCOUNTER
Patient called stating her potassium cirate 10 mEq was not sent over to her pharmacy  Verified it was not sent over, class of medication was wrong  Informed pt we will send it over right away

## 2022-02-22 ENCOUNTER — TELEPHONE (OUTPATIENT)
Dept: NEPHROLOGY | Facility: CLINIC | Age: 47
End: 2022-02-22

## 2022-02-22 LAB
BACTERIA UR QL AUTO: ABNORMAL /HPF
BILIRUB UR QL STRIP: NEGATIVE
CLARITY UR: CLEAR
COLOR UR: ABNORMAL
GLUCOSE UR STRIP-MCNC: NEGATIVE MG/DL
HGB UR QL STRIP.AUTO: ABNORMAL
KETONES UR STRIP-MCNC: NEGATIVE MG/DL
LEUKOCYTE ESTERASE UR QL STRIP: ABNORMAL
NITRITE UR QL STRIP: NEGATIVE
NON-SQ EPI CELLS URNS QL MICRO: ABNORMAL /HPF
PH UR STRIP.AUTO: 6.5 [PH]
PROT UR STRIP-MCNC: NEGATIVE MG/DL
RBC #/AREA URNS AUTO: ABNORMAL /HPF
SP GR UR STRIP.AUTO: 1.01 (ref 1–1.03)
UROBILINOGEN UR QL STRIP.AUTO: 0.2 E.U./DL
WBC #/AREA URNS AUTO: ABNORMAL /HPF

## 2022-02-22 RX ORDER — POTASSIUM CITRATE 10 MEQ/1
10 TABLET, EXTENDED RELEASE ORAL 2 TIMES DAILY
Qty: 180 TABLET | Refills: 3 | Status: SHIPPED | OUTPATIENT
Start: 2022-02-22

## 2022-02-22 NOTE — TELEPHONE ENCOUNTER
----- Message from Jessy Esquivel DO sent at 2/22/2022  2:14 PM EST -----  Potassium and renal function are stable will review with her in detail at her follow-up appointment

## 2022-02-24 ENCOUNTER — CLINICAL SUPPORT (OUTPATIENT)
Dept: BARIATRICS | Facility: CLINIC | Age: 47
End: 2022-02-24

## 2022-02-24 ENCOUNTER — HOSPITAL ENCOUNTER (OUTPATIENT)
Dept: MAMMOGRAPHY | Facility: IMAGING CENTER | Age: 47
Discharge: HOME | End: 2022-02-24
Payer: COMMERCIAL

## 2022-02-24 VITALS — WEIGHT: 233 LBS | BODY MASS INDEX: 42.88 KG/M2 | HEIGHT: 62 IN

## 2022-02-24 VITALS — HEIGHT: 62 IN | BODY MASS INDEX: 43.47 KG/M2 | WEIGHT: 236.2 LBS

## 2022-02-24 DIAGNOSIS — R63.5 ABNORMAL WEIGHT GAIN: Primary | ICD-10-CM

## 2022-02-24 DIAGNOSIS — Z12.31 ENCOUNTER FOR SCREENING MAMMOGRAM FOR BREAST CANCER: ICD-10-CM

## 2022-02-24 PROCEDURE — RECHECK

## 2022-02-24 PROCEDURE — 77067 SCR MAMMO BI INCL CAD: CPT

## 2022-02-24 PROCEDURE — 77063 BREAST TOMOSYNTHESIS BI: CPT

## 2022-03-02 LAB
AMMONIA 24H UR-MRATE: 33 MEQ/24 HR
AMMONIA UR-SCNC: ABNORMAL UG/DL
CA H2 PHOS DIHYD CRY URNS QL MICRO: 2.69 RATIO (ref 0–3)
CALCIUM 24H UR-MCNC: 17 MG/DL
CALCIUM 24H UR-MRATE: 306 MG/24 HR (ref 0–320)
CHLORIDE 24H UR-SCNC: 59 MMOL/L
CHLORIDE 24H UR-SRATE: 106 MMOL/24 HR (ref 38–210)
CITRATE 24H UR-MCNC: 282 MG/L
CITRATE 24H UR-MRATE: 508 MG/24 HR (ref 320–1240)
COM CRY STONE QL IR: 4.48 RATIO (ref 0–6)
CREAT 24H UR-MCNC: 73 MG/DL
CREAT 24H UR-MRATE: 1314 MG/24 HR (ref 800–1800)
CYSTINE 24H UR-MCNC: 5.34 MG/L
CYSTINE 24H UR-MRATE: 9.61 MG/24 HR (ref 2.1–58)
MAGNESIUM 24H UR-MRATE: 121 MG/24 HR (ref 12–293)
MAGNESIUM UR-MCNC: 6.7 MG/DL
NA URATE CRY STONE QL IR: 2.39 RATIO (ref 0–4)
OSMOLALITY UR: 418 MOSMOL/KG (ref 300–900)
OXALATE 24H UR-MRATE: 18 MG/24 HR (ref 4–31)
OXALATE UR-MCNC: 10 MG/L
PH 24H UR: 6.4 [PH] (ref 4.5–8)
PHOSPHATE 24H UR-MCNC: 34.4 MG/DL
PHOSPHATE 24H UR-MRATE: 619.2 MG/24 HR (ref 261–1078)
PLEASE NOTE (STONE RISK): ABNORMAL
POTASSIUM 24H UR-SCNC: 62.8 MMOL/24 HR (ref 14–95)
POTASSIUM UR-SCNC: 34.9 MMOL/L
PRESERVED URINE: 1800 ML/24 HR (ref 600–1600)
SODIUM 24H UR-SCNC: 54 MMOL/L
SODIUM 24H UR-SRATE: 97 MMOL/24 HR (ref 39–258)
SPECIMEN VOL 24H UR: 1800 ML/24 HR (ref 600–1600)
SULFATE 24H UR-MCNC: 31 MEQ/24 HR (ref 0–30)
SULFATE UR-MCNC: 17 MEQ/L
TRI-PHOS CRY STONE MICRO: 0.07 RATIO (ref 0–1)
URATE 24H UR-MCNC: 38.7 MG/DL
URATE 24H UR-MRATE: 697 MG/24 HR (ref 174–902)
URATE DIHYD CRY STONE QL IR: 0.7 RATIO (ref 0–1.2)

## 2022-03-03 ENCOUNTER — TELEPHONE (OUTPATIENT)
Dept: NEPHROLOGY | Facility: CLINIC | Age: 47
End: 2022-03-03

## 2022-03-03 ENCOUNTER — CLINICAL SUPPORT (OUTPATIENT)
Dept: BARIATRICS | Facility: CLINIC | Age: 47
End: 2022-03-03

## 2022-03-03 VITALS — BODY MASS INDEX: 43.36 KG/M2 | HEIGHT: 62 IN | WEIGHT: 235.6 LBS

## 2022-03-03 DIAGNOSIS — R63.5 ABNORMAL WEIGHT GAIN: Primary | ICD-10-CM

## 2022-03-03 DIAGNOSIS — E11.9 TYPE 2 DIABETES MELLITUS WITHOUT COMPLICATION, WITHOUT LONG-TERM CURRENT USE OF INSULIN (HCC): Primary | ICD-10-CM

## 2022-03-03 PROCEDURE — RECHECK

## 2022-03-03 NOTE — TELEPHONE ENCOUNTER
Reschedule Appointment   Person speaking to  Patient   Date of original appointment 3/31/22    New appointment date 3/9/22   Patient on dialysis no   Location [place]   Provider Lazarus Head   Additional Information

## 2022-03-07 ENCOUNTER — TELEPHONE (OUTPATIENT)
Dept: NEPHROLOGY | Facility: CLINIC | Age: 47
End: 2022-03-07

## 2022-03-07 NOTE — TELEPHONE ENCOUNTER
Needed to reschedule appointment due to child having covid and needing to quarantine  Made an appointment for the end of the month

## 2022-03-10 ENCOUNTER — CLINICAL SUPPORT (OUTPATIENT)
Dept: BARIATRICS | Facility: CLINIC | Age: 47
End: 2022-03-10

## 2022-03-10 VITALS — BODY MASS INDEX: 43.24 KG/M2 | HEIGHT: 62 IN | WEIGHT: 235 LBS

## 2022-03-10 DIAGNOSIS — R63.5 ABNORMAL WEIGHT GAIN: Primary | ICD-10-CM

## 2022-03-10 PROCEDURE — RECHECK

## 2022-03-15 NOTE — PROGRESS NOTES
Weight Management Medical Nutrition Assessment  Annemarie Zapata presented for the 2 week follow-up session with the [facility]   Today's weight is 233 4 #  She has lost 2 7# in the past month  She stated she has increased her fruit / vegetables intake  She stated she is still struggling with night snacking  Discussed pre planning and pre logging her night snacks  Tracking with Lavon  Reviewed her low calorie meal plan       Patient seen by Medical Provider in past 6 months:  yes  Requested to schedule appointment with Medical Provider: No        Anthropometric Measurements  Start Weight (#): 236 1#           Current Weight (#): 233 4#  TBW % Change from start weight:1 1%  Ideal Body Weight (#):110#  Goal Weight (#):STG: under 200#      Weight Loss History  Previous weight loss attempts: Commercial Programs (Weight Watchers, Nicki Cones, etc )  Self Created Diets (Portion Control, Healthy Food Choices, etc )     Food and Nutrition Related History        Part-time SDS 2x      Food Recall  Breakfast: 2x Toast with PB  Or bowl of cereal - meausured  Snack:granola bar or almonds or skip   Lunch: Salad with tuna or frozen pizza   Snack:granola bar - [employer] chocolate   Dinner:Lean protein/rice- large portions  / veggies -small portions  Snack:pretzels/ chips  Or protein shake         Beverages: water  Volume of beverage intake: 60oz     Weekends: Same  Cravings: salty carbs/ crunchy   Trouble area of day:night snacking     Frequency of Eating out: every 3 days  Food restrictions:none reported  Cooking: self   Food Shopping: self     Physical Activity Intake  Activity:none - would walk - has a treadmill at home   Frequency:rarely  Physical limitations/barriers to exercise: none reported     Estimated Needs  Energy  Bear Fabiana Energy Needs:  BMR : 1674 calories   1-2# loss weekly sedentary:  4734-7062 calories              1-2# loss weekly lightly active:3178-0166 calories   Maintenance calories for sedentary activity level: 2008 calories   Protein: 60-75gm    (1 2-1 5g/kg IBW)  Fluid: 58oz     (35mL/kg IBW)     Nutrition Diagnosis  Yes;     Overweight/obesity  related to Excess energy intake as evidenced by  BMI more than normative standard for age and sex (obesity-grade III 36+)     Nutrition Intervention     Nutrition Prescription  Calories:1200 calories on sedentary days and flex to 1400 calories on walk days  Protein:60-785gm  Fluid:58oz     Meal Plan (Ortega/Pro/Carb)  Breakfast: 200-300/20/30  Snack:  Lunch: 300-400/30/30-45  Snack: 100-150/>5/20  Dinner: 300-400/30/30-45  Snack: 100-150/>5/20  Snack: 100-150/>5/20     Nutrition Education:    Healthy Core Manual  Calorie controlled menu  Lean protein food choices  Healthy snack options  Food journaling tips        Nutrition Counseling:  Strategies: meal planning, portion sizes, healthy snack choices, hydration, fiber intake, protein intake, exercise, food journal        Monitoring and Evaluation:  Evaluation criteria:  Energy Intake  Meet protein needs  Maintain adequate hydration  Monitor weekly weight  Meal planning/preparation  Food journal   Decreased portions at mealtimes and snacks  Physical activity      Barriers to learning:none  Readiness to change: Preparation:  (Getting ready to change)   Comprehension: good  Expected Compliance: good

## 2022-03-16 ENCOUNTER — OFFICE VISIT (OUTPATIENT)
Dept: BARIATRICS | Facility: CLINIC | Age: 47
End: 2022-03-16

## 2022-03-16 VITALS — BODY MASS INDEX: 42.95 KG/M2 | HEIGHT: 62 IN | WEIGHT: 233.4 LBS

## 2022-03-16 DIAGNOSIS — R63.5 ABNORMAL WEIGHT GAIN: Primary | ICD-10-CM

## 2022-03-16 PROCEDURE — RECHECK: Performed by: DIETITIAN, REGISTERED

## 2022-03-17 ENCOUNTER — CLINICAL SUPPORT (OUTPATIENT)
Dept: BARIATRICS | Facility: CLINIC | Age: 47
End: 2022-03-17

## 2022-03-17 VITALS — WEIGHT: 235 LBS | HEIGHT: 62 IN | BODY MASS INDEX: 43.24 KG/M2

## 2022-03-17 DIAGNOSIS — R63.5 ABNORMAL WEIGHT GAIN: Primary | ICD-10-CM

## 2022-03-17 PROCEDURE — RECHECK

## 2022-03-29 ENCOUNTER — TELEPHONE (OUTPATIENT)
Dept: NEPHROLOGY | Facility: CLINIC | Age: 47
End: 2022-03-29

## 2022-03-29 NOTE — TELEPHONE ENCOUNTER
Appointment Confirmation   Person confirmed appointment with  If not patient, name of the person Voice message    Date and time of appointment 3/31    10:00   Patient acknowledged and will be at appointment? no    Did you advise the patient that they will need a urine sample if they are a new patient?  N/A    Did you advise the patient to bring their current medications for verification? (including any OTC) Yes    Additional Information

## 2022-03-31 ENCOUNTER — OFFICE VISIT (OUTPATIENT)
Dept: NEPHROLOGY | Facility: CLINIC | Age: 47
End: 2022-03-31
Payer: COMMERCIAL

## 2022-03-31 VITALS
WEIGHT: 237.2 LBS | BODY MASS INDEX: 43.65 KG/M2 | SYSTOLIC BLOOD PRESSURE: 106 MMHG | HEIGHT: 62 IN | DIASTOLIC BLOOD PRESSURE: 72 MMHG | OXYGEN SATURATION: 93 %

## 2022-03-31 DIAGNOSIS — N20.0 NEPHROLITHIASIS: Primary | ICD-10-CM

## 2022-03-31 DIAGNOSIS — R60.1 GENERALIZED EDEMA: ICD-10-CM

## 2022-03-31 PROCEDURE — 99214 OFFICE O/P EST MOD 30 MIN: CPT | Performed by: INTERNAL MEDICINE

## 2022-03-31 NOTE — PROGRESS NOTES
OFFICE FOLLOW UP - Nephrology   Casey Rubio 41 y o  female MRN: [id number]    Encounter: [id number]        ASSESSMENT    79-year-old female with a past medical history the past medical history of nephrolithiasis presents for follow-up evaluation    Nephrolithiasis  Hydronephrosis with renal and ureteral calculus obstruction  Hydronephrosis of left kidney  Hyponatremia  GERD (gastroesophageal reflux disease)  Hypomagnesemia    DISCUSSION & PLAN    1  Nephrolithiasis  -she feels like she has a stone currently with some pain in back  -medical management of stones has been difficult  -her 24 hour Urine calcium is relatively stable  -her blood pressures were on the lower side so we switched her from chlorthalidone to hydrochlorothiazide and she now takes 25 mg daily  -she also started on Calcium supplements over 6 months and increasing in dairy products  -renal function remains stable  -now takes a potassium citrate once a day  -continue hydration 2-2 5 L  -her renal ultrasound showed that her hydronephrosis was improved in January of 2021, will repeat a ultrasound now and she is having more lower extremity edema will add an abdominal ultrasound complete to evaluate for any liver pathology  -CT abd/pelvis does show moderate hydroureteronephrosis with no calculi in right pelvis  -Had NM kidney with flow and function-63% left kidney perfusion 63% Right kidney 36 9%  -Left renal excretion was within normal limits  -Right Kidney demonstrates stasis of readiotracer but responds normally to lasix administration compatibily with a nonobstruced system  -plan:  2 5 L hydration, hydrochlorothiazide 25 mg daily, potassium citrate 10 mEq daily is all that she can tolerate  No recent kidney stone  Abdominal ultrasound    2  Hydronephrosis with ureteral calculus obstruction  -this seems to be resolving her renal function remains stable she was getting yearly ultrasound by Urology    3   Hyponatremia  -her sodium remained stable at 136 will monitor on chlorthalidone, electrolytes are stable    4  GERD  -acid reflux improved on pepcid and now Protonix    5  Hypomagnesemia  -continue magnesium supplementation this has improved likely related to PPI use    6  hypokalemia  -relatively stable now    7  Vitamin-D deficiency  -remains on vitamin-D supplement    8  Elevated BMI  -now seeing weight loss center started on G LP 1 agonist , also with an elevated A1c    9  Lower extremity edema  -this could be related to venous stasis  -no proteinuria on urinalysis  -no history of cardiac disease or heart failure  Did have some mildly enlarged fatty infiltrative changes on liver from CT of the abdomen pelvis in 2019 so will check an abdominal ultrasound    Continue yearly 24 hour urine stone risk profile  Abdominal ultrasound now and if stable follow-up in 1 year    HPI: Eusebio Davidson is a 41 y o  female who is here for follow-up    She has been doing relatively well she does notice that she has increased edema worse at night  Her blood pressures have remained stable, she is trying to lose more weight, denies any acute chest pain or shortness of breath no fevers or chills no nausea vomiting diarrhea constipation no foamy or bloody urine    ROS:   Review of systems was otherwise negative    Allergies: Patient has no known allergies      Medications:   Current Outpatient Medications:     Calcium Carb-Cholecalciferol (CALCIUM/VITAMIN D PO), Take 1 tablet by mouth every other day, Disp: , Rfl:     famotidine (PEPCID) 20 mg tablet, Take 20 mg by mouth daily, Disp: , Rfl:     hydrochlorothiazide (HYDRODIURIL) 25 mg tablet, Take 1 tablet (25 mg total) by mouth daily, Disp: 90 tablet, Rfl: 3    magnesium oxide (MAG-OX) 400 mg, Take 1 tablet (400 mg total) by mouth daily, Disp: 90 tablet, Rfl: 3    omeprazole (PriLOSEC) 20 mg delayed release capsule, Take 1 capsule (20 mg total) by mouth every morning, Disp: 90 capsule, Rfl: 6    potassium citrate (UROCIT-K) 10 mEq, Take 1 tablet (10 mEq total) by mouth 2 (two) times a day (Patient taking differently: Take 10 mEq by mouth in the morning  ), Disp: 180 tablet, Rfl: 3    Semaglutide, 1 MG/DOSE, 2 MG/1 5ML SOPN, Inject 1mg under skin once weekly, Disp: 9 mL, Rfl: 1    famotidine (PEPCID) 20 mg tablet, Take 1 tablet (20 mg total) by mouth daily at bedtime (Patient not taking: Reported on 2/3/2022 ), Disp: 90 tablet, Rfl: 6    Past Medical History:   Diagnosis Date    Diabetes mellitus ([address] )     DJD (degenerative joint disease)     Eczema     GERD (gastroesophageal reflux disease)     History of gestational diabetes     Hydronephrosis     last assessed 17 documented resolved 17    Kidney stones 2020    Menorrhagia with regular cycle 2018    treated with Lysteda    Nephrolithiasis     Obesity     Seasonal allergies     Varicella      Past Surgical History:   Procedure Laterality Date     SECTION      X 2  and      CYSTOSCOPY      DILATION AND CURETTAGE OF UTERUS      x 2 for missed     MD CYSTO/URETERO W/LITHOTRIPSY &INDWELL STENT INSRT Right 2016    Procedure: CYSTOSCOPY; URETEROSCOPY; HOLMIUM LASER LITHOTRIPSY; BASKET STONE EXTRACTION; RETROGRADE PYELOGRAM; STENT PLACEMENT ;  Surgeon: Manasa Ward MD;  Location:  MAIN OR;  Service: Urology    MD CYSTO/URETERO W/LITHOTRIPSY &INDWELL STENT INSRT Left 2018    Procedure: CYSTOSCOPY, LEFT URETEROSCOPY AND RENAL ENDOSCOPY WITH LITHOTRIPSY OF RENAL CALCULUS WITH HOLMIUM LASER,  BILATERAL RETROGRADE PYELOGRAM AND INSERTION OF LEFT URETERAL STENT;  Surgeon: Paul Higginbotham MD;  Location: [facility];  Service: Urology    TONSILLECTOMY      TUBAL LIGATION      at time of     WISDOM TOOTH EXTRACTION       Family History   Problem Relation Age of Onset    Diabetes Mother     Heart attack Father     Heart disease Father         myocardial infarction    Hyperlipidemia Father  Hypertension Father     Coronary artery disease Father     Cancer Father 54        malignant neoplasm of urinary bladder    Multiple sclerosis Sister     COPD Maternal Grandmother     Liver cancer Paternal Grandmother 79    Pancreatic cancer Paternal Grandmother 79    Diabetes Paternal Grandmother     Stomach cancer Paternal Grandfather         unknown age   [place] No Known Problems Maternal Grandfather     Endometrial cancer Paternal Aunt 59    No Known Problems Son     No Known Problems Son     No Known Problems Sister     No Known Problems Maternal Aunt     No Known Problems Maternal Aunt     Breast cancer Neg Hx     Ovarian cancer Neg Hx     Colon cancer Neg Hx       reports that she has never smoked  She has never used smokeless tobacco  She reports current alcohol use  She reports that she does not use drugs  Physical Exam:   Vitals:    03/31/22 0954   BP: 106/72   BP Location: Left arm   Patient Position: Sitting   Cuff Size: Large   SpO2: 93%   Weight: 108 kg (237 lb 3 2 oz)   Height: 5' 2" (1 575 m)     Body mass index is 43 38 kg/m²      General: conscious, cooperative, in not acute distress  Eyes: conjunctivae pink, anicteric sclerae  ENT: lips and mucous membranes moist  Neck: supple, no JVD  Chest: clear breath sounds bilateral, no crackles, ronchus or wheezings  CVS: distinct S1 & S2, normal rate, regular rhythm  Abdomen: soft, non-tender, non-distended, normoactive bowel sounds  Extremities:  2+ edema  Skin: no rash  Neuro: awake, alert, oriented      Lab Results:    Results for orders placed or performed in visit on 67/18/36   Basic metabolic panel   Result Value Ref Range    Sodium 137 136 - 145 mmol/L    Potassium 4 0 3 5 - 5 3 mmol/L    Chloride 97 (L) 100 - 108 mmol/L    CO2 35 (H) 21 - 32 mmol/L    ANION GAP 5 4 - 13 mmol/L    BUN 19 5 - 25 mg/dL    Creatinine 0 91 0 60 - 1 30 mg/dL    Glucose, Fasting 127 (H) 65 - 99 mg/dL    Calcium 9 2 8 3 - 10 1 mg/dL    eGFR 76 ml/min/1 73sq m   Magnesium   Result Value Ref Range    Magnesium 1 8 1 6 - 2 6 mg/dL       Portions of the record may have been created with voice recognition software  Occasional wrong word or "sound a like" substitutions may have occurred due to the inherent limitations of voice recognition software  Read the chart carefully and recognize, using context, where substitutions have occurred  If you have any questions, please contact the dictating provider

## 2022-03-31 NOTE — PATIENT INSTRUCTIONS
Low-Sodium Diet   WHAT YOU NEED TO KNOW:   What is a low-sodium diet? A low-sodium diet limits foods that are high in sodium (salt)  You will need to follow a low-sodium diet if you have high blood pressure, kidney disease, or heart failure  You may also need to follow this diet if you have a condition that is causing your body to retain (hold) extra fluid  You may need to limit the amount of sodium you eat in a day to 1,500 to 2,000 mg  Ask your healthcare provider how much sodium you can have each day  How can I use food labels to choose foods that are low in sodium? Read food labels to find the amount of sodium they contain  The amount of sodium is listed in milligrams (mg)  The % Daily Value (DV) column tells you how much of your daily needs are met by 1 serving of the food for each nutrient listed  Choose foods that have less than 5% of the DV of sodium  These foods are considered low in sodium  Foods that have 20% or more of the DV of sodium are considered high in sodium  Some food labels may also list any of the following terms that tell you about the sodium content in the food:  · Sodium-free:  Less than 5 mg in each serving    · Very low sodium:  35 mg of sodium or less in each serving    · Low sodium:  140 mg of sodium or less in each serving    · Reduced sodium: At least 25% less sodium in each serving than the regular type    · Light in sodium:  50% less sodium in each serving    · Unsalted or no added salt:  No extra salt is added during processing (the food may still contain sodium)       Which foods should I avoid? Salty foods are high in sodium  You should avoid the following:  · Processed foods:      ? Mixes for cornbread, biscuits, cake, and pudding     ? Instant foods, such as potatoes, cereals, noodles, and rice     ? Packaged foods, such as bread stuffing, rice and pasta mixes, snack dip mixes, and macaroni and cheese     ?  Canned foods, such as canned vegetables, soups, broths, sauces, and vegetable or tomato juice    ? Snack foods, such as salted chips, popcorn, pretzels, pork rinds, salted crackers, and salted nuts    ? Frozen foods, such as dinners, entrees, vegetables with sauces, and breaded meats    ? Sauerkraut, pickled vegetables, and other foods prepared in brine    · Meats and cheeses:      ? Smoked or cured meat, such as corned beef, walker, ham, hot dogs, and sausage    ? Canned meats or spreads, such as potted meats, sardines, anchovies, and imitation seafood    ? Deli or lunch meats, such as bologna, ham, turkey, and roast beef    ? Processed cheese, such as American cheese and cheese spreads    · Condiments, sauces, and seasonings:      ? Salt (¼ teaspoon of salt contains 575 mg of sodium)    ? Seasonings made with salt, such as garlic salt, celery salt, onion salt, and seasoned salt    ? Regular soy sauce, barbecue sauce, teriyaki sauce, steak sauce, Worcestershire sauce, and most flavored vinegars    ? Canned gravy and mixes     ? Regular condiments, such as mustard, ketchup, and salad dressings    ? Pickles and olives    ? Meat tenderizers and monosodium glutamate (MSG)    Which foods can I include? Read the food label to find the amount of sodium in each serving  · Bread and cereal:  Try to choose breads with less than 80 mg of sodium per serving  ? Bread, roll, daniella, tortilla, or unsalted crackers  ? Ready-to-eat cereals with less than 5% DV of sodium (examples include shredded wheat and puffed rice)    ? Pasta    · Vegetables and fruits:      ? Unsalted fresh, frozen, or canned vegetables    ? Fresh, frozen, or canned fruits    ? Fruit juice    · Dairy:  One serving has about 150 mg of sodium  ? Milk, all types    ? Yogurt    ? Hard cheese, such as cheddar, Swiss, [employer], or mozzarella    · Meat and other protein foods:  Some raw meats may have added sodium  ? Plain meats, fish, and poultry     ? Egg    · Other foods:      ? Homemade pudding    ?  Unsalted nuts, popcorn, or pretzels    ? Unsalted butter or margarine    What are some ways that I can decrease sodium? · Add spices and herbs to foods instead of salt during cooking  Use salt-free seasonings to add flavor to foods  Examples include onion powder, garlic powder, basil, gonzalez powder, paprika, and parsley  Try lemon or lime juice or vinegar to give foods a tart flavor  Use hot peppers, pepper, or cayenne pepper to add a spicy flavor  · Do not keep a salt shaker at your kitchen table  This may help keep you from adding salt to food at the table  A teaspoon of salt has 2,300 mg of sodium  It may take time to get used to enjoying the natural flavor of food instead of adding salt  Talk to your healthcare provider before you use salt substitutes  Some salt substitutes have a high amount of potassium and need to be avoided if you have kidney disease  · Choose low-sodium foods at restaurants  Meals from restaurants are often high in sodium  Some restaurants have nutrition information on the menu that tells you the amount of sodium in their foods  If possible, ask for your food to be prepared with less, or no salt  · Shop for unsalted or low-sodium foods and snacks at the grocery store  Examples include unsalted or low-sodium broths, soups, and canned vegetables  Choose fresh or frozen vegetables instead  Choose unsalted nuts or seeds or fresh fruits or vegetables as snacks  Read food labels and choose salt-free, very low-sodium, or low-sodium foods  CARE AGREEMENT:   You have the right to help plan your care  Discuss treatment options with your healthcare provider to decide what care you want to receive  You always have the right to refuse treatment  The above information is an  only  It is not intended as medical advice for individual conditions or treatments  Talk to your doctor, nurse or pharmacist before following any medical regimen to see if it is safe and effective for you    © Copyright [employer] 2022 Information is for End User's use only and may not be sold, redistributed or otherwise used for commercial purposes   All illustrations and images included in CareNotes® are the copyrighted property of A [employer] A M , Inc  or [address]

## 2022-04-06 ENCOUNTER — OFFICE VISIT (OUTPATIENT)
Dept: BARIATRICS | Facility: CLINIC | Age: 47
End: 2022-04-06

## 2022-04-06 VITALS — WEIGHT: 228.95 LBS | HEIGHT: 62 IN | BODY MASS INDEX: 42.13 KG/M2

## 2022-04-06 DIAGNOSIS — R63.5 ABNORMAL WEIGHT GAIN: ICD-10-CM

## 2022-04-06 PROCEDURE — DB3PK: Performed by: DIETITIAN, REGISTERED

## 2022-04-06 PROCEDURE — RECHECK: Performed by: DIETITIAN, REGISTERED

## 2022-04-06 NOTE — PROGRESS NOTES
Weight Management Medical Nutrition Assessment  Sherice presented for 1/3 RD Bundle   Today's weight is 228 9#  She has lost 4 5# in the past 3 weeks  She stated she has increased her fruit / vegetables intake  She stated night snacking has improved  Satisfied on smaller portions  Discussed pre planning and pre logging her night snacks  Tracking with manual log and average 3681-1133 calories  Reviewed her low calorie meal plan       Patient seen by Medical Provider in past 6 months:  yes  Requested to schedule appointment with Medical Provider: No        Anthropometric Measurements  Start Weight (#): 236  1#           Current Weight (#): 228  95#  TBW % Change from start weight:%  Ideal Body Weight (#):110#  Goal Weight (#):STG: under 200#      Weight Loss History  Previous weight loss attempts: Commercial Programs (Weight Watchers, [employer] [facility], etc )  Self Created Diets (Portion Control, Healthy Food Choices, etc )     Food and Nutrition Related History        Part-time SDS 2x      Food Recall  Breakfast: 2x Toast with PB  Or bowl of cereal - meausured  Snack:granola bar or almonds or skip   Lunch: Salad with tuna or frozen pizza   Snack:granola bar - [employer] chocolate   Dinner:Lean protein/rice- large portions  / veggies -small portions  Snack:goldfish   Or protein shake         Beverages: water  Volume of beverage intake: 60oz     Weekends: Same  Cravings: salty carbs/ crunchy   Trouble area of day:night snacking     Frequency of Eating out: every 3 days  Food restrictions:none reported  Cooking: self   Food Shopping: self     Physical Activity Intake  Activity:none - would walk - has a treadmill at [employer]  Frequency:rarely  Physical limitations/barriers to exercise: none reported     Estimated Needs  Energy  Bear Fabiana Energy Needs:  BMR : 1448 calories   1-2# loss weekly sedentary:  392-0794 calories              1-2# loss weekly lightly active:9302-7343 calories   Maintenance calories for sedentary activity level:1964 calories   Protein: 60-75gm    (1 2-1 5g/kg IBW)  Fluid: 58oz     (35mL/kg IBW)     Nutrition Diagnosis  Yes;    Overweight/obesity  related to Excess energy intake as evidenced by  BMI more than normative standard for age and sex (obesity-grade III 36+)     Nutrition Intervention     Nutrition Prescription  Calories:1400 calories and flex to 1600 calories on walk day  Protein:60-785gm  Fluid:58oz     Meal Plan (Ortega/Pro/Carb)  Breakfast: 200-300/20/30  Snack:  Lunch: 300-400/30/30-45  Snack: 100-150/>5/20  Dinner: 300-400/30/30-45  Snack: 100-150/>5/20  Snack: 100-150/>5/20     Nutrition Education:    Healthy Core Manual  Calorie controlled menu  Lean protein food choices  Healthy snack options  Food journaling tips        Nutrition Counseling:  Strategies: meal planning, portion sizes, healthy snack choices, hydration, fiber intake, protein intake, exercise, food journal        Monitoring and Evaluation:  Evaluation criteria:  Energy Intake  Meet protein needs  Maintain adequate hydration  Monitor weekly weight  Meal planning/preparation  Food journal   Decreased portions at mealtimes and snacks  Physical activity      Barriers to learning:none  Readiness to change: Preparation:  (Getting ready to change)   Comprehension: good  Expected Compliance: good

## 2022-04-20 ENCOUNTER — HOSPITAL ENCOUNTER (OUTPATIENT)
Dept: RADIOLOGY | Facility: HOSPITAL | Age: 47
Discharge: HOME | End: 2022-04-20
Attending: INTERNAL MEDICINE
Payer: COMMERCIAL

## 2022-04-20 DIAGNOSIS — R60.1 GENERALIZED EDEMA: ICD-10-CM

## 2022-04-20 DIAGNOSIS — N20.0 NEPHROLITHIASIS: ICD-10-CM

## 2022-04-20 PROCEDURE — 76700 US EXAM ABDOM COMPLETE: CPT

## 2022-04-26 ENCOUNTER — TELEPHONE (OUTPATIENT)
Dept: NEPHROLOGY | Facility: CLINIC | Age: 47
End: 2022-04-26

## 2022-04-26 NOTE — TELEPHONE ENCOUNTER
----- Message from Vania Webster DO sent at 4/25/2022  4:33 PM EDT -----  Please let her know that her Abdominal US was reviweed Liver looks ok mildly prominent size with some simple cyst but no liver masses or cirrhosis  Has a nonobstructing stone on left side  Otherwise stable  Thanks

## 2022-04-26 NOTE — TELEPHONE ENCOUNTER
I called the patient informing her of US results per Dr Abel Shook, pt verbalized understanding at this time and had no questions at this time

## 2022-04-27 ENCOUNTER — OFFICE VISIT (OUTPATIENT)
Dept: BARIATRICS | Facility: CLINIC | Age: 47
End: 2022-04-27

## 2022-04-27 VITALS — WEIGHT: 231.7 LBS | BODY MASS INDEX: 42.64 KG/M2 | HEIGHT: 62 IN

## 2022-04-27 DIAGNOSIS — R63.5 ABNORMAL WEIGHT GAIN: Primary | ICD-10-CM

## 2022-04-27 PROCEDURE — RECHECK: Performed by: DIETITIAN, REGISTERED

## 2022-04-27 NOTE — PROGRESS NOTES
Weight Management Medical Nutrition Assessment  Sherice presented for 2/3 RD Bundle   Today's weight is 231 7#  She has had a 2 8# wieght gain the the past 3 weeks and overall has lost 4 4#  She stated she has really struggled to get back on track after the Easter Holiday  She stated she has been snacking more at night and her portions at meals are larger  She has not been food logging and her physical activity level is low  Discussed pre planning and pre logging her night snacks  Will attempt to resume food logging  Reviewed her low calorie meal plan       Patient seen by Medical Provider in past 6 months:  yes  Requested to schedule appointment with Medical Provider: No        Anthropometric Measurements  Start Weight (#): 236  1#           Current Weight (#): 231 7#  TBW % Change from start weight:1 9%  Ideal Body Weight (#):110#  Goal Weight (#):STG: under 200#      Weight Loss History  Previous weight loss attempts: Commercial Programs (Weight Watchers, [employer], etc )  Self Created Diets (Portion Control, Healthy Food Choices, etc )     Food and Nutrition Related History        Part-time SDS 2x      Food Recall  Breakfast:2x Toast with PB  Or bowl of cereal - measured or Egg Burrito   Snack:granola bar or almonds or skip   Lunch: Salad with tuna or frozen pizza - Cafe at work   [address] chocolate   Dinner:Lean protein/rice- large portions  / veggies larger portions  Snack:goldfish/ salty carbs         Beverages: water  Volume of beverage intake: 60oz     Weekends: Same  Cravings: salty carbs/ crunchy - sweets over Easter  Trouble area of day:night snacking     Frequency of Eating out: every 3 days  Food restrictions:none reported  Cooking: self   Food Shopping: self     Physical Activity Intake  Activity:none - would walk - has a treadmill at [employer]  Frequency:rarely  Physical limitations/barriers to exercise: none reported     Estimated Needs  Energy  B[employer] Energy Needs: BMR : 8033 calories   1-2# loss weekly sedentary:  834-7720 calories              1-2# loss weekly lightly active:5941-2712 calories   Maintenance calories for sedentary activity level:1964 calories   Protein: 60-75gm    (1 2-1 5g/kg IBW)  Fluid: 58oz     (35mL/kg IBW)     Nutrition Diagnosis  Yes;    Overweight/obesity  related to Excess energy intake as evidenced by  BMI more than normative standard for age and sex (obesity-grade III 36+)     Nutrition Intervention     Nutrition Prescription  Calories:1400 calories and flex to 1600 calories on walk day  Protein:60-785gm  Fluid:58oz     Meal Plan (Ortega/Pro/Carb)  Breakfast: 200-300/20/30  Snack:  Lunch: 300-400/30/30-45  Snack: 100-150/>5/20  Dinner: 300-400/30/30-45  Snack: 100-150/>5/20  Snack: 100-150/>5/20     Nutrition Education:    Healthy Core Manual  Calorie controlled menu  Lean protein food choices  Healthy snack options  Food journaling tips        Nutrition Counseling:  Strategies: meal planning, portion sizes, healthy snack choices, hydration, fiber intake, protein intake, exercise, food journal        Monitoring and Evaluation:  Evaluation criteria:  Energy Intake  Meet protein needs  Maintain adequate hydration  Monitor weekly weight  Meal planning/preparation  Food journal   Decreased portions at mealtimes and snacks  Physical activity      Barriers to learning:none  Readiness to change: Preparation:  (Getting ready to change)   Comprehension: good  Expected Compliance: good

## 2022-05-20 ENCOUNTER — OFFICE VISIT (OUTPATIENT)
Dept: FAMILY MEDICINE CLINIC | Facility: CLINIC | Age: 47
End: 2022-05-20
Payer: COMMERCIAL

## 2022-05-20 VITALS
HEIGHT: 62 IN | BODY MASS INDEX: 41.37 KG/M2 | HEART RATE: 96 BPM | DIASTOLIC BLOOD PRESSURE: 80 MMHG | WEIGHT: 224.8 LBS | RESPIRATION RATE: 16 BRPM | SYSTOLIC BLOOD PRESSURE: 122 MMHG

## 2022-05-20 DIAGNOSIS — E78.5 DYSLIPIDEMIA: ICD-10-CM

## 2022-05-20 DIAGNOSIS — E11.9 TYPE 2 DIABETES MELLITUS WITHOUT COMPLICATION, WITHOUT LONG-TERM CURRENT USE OF INSULIN (HCC): ICD-10-CM

## 2022-05-20 DIAGNOSIS — Z00.00 ANNUAL PHYSICAL EXAM: Primary | ICD-10-CM

## 2022-05-20 PROCEDURE — 99396 PREV VISIT EST AGE 40-64: CPT | Performed by: PHYSICIAN ASSISTANT

## 2022-05-20 NOTE — PATIENT INSTRUCTIONS

## 2022-05-20 NOTE — PROGRESS NOTES
ADULT ANNUAL PHYSICAL  [facility]    NAME: Christiano Rubio  AGE: 41 y o  SEX: female  : 1975     DATE: 2022     Assessment and Plan:     Healthy     Immunizations and preventive care screenings were discussed with patient today  Appropriate education was printed on patient's after visit summary  Counseling:  Alcohol/drug use: discussed moderation in alcohol intake, the recommendations for healthy alcohol use, and avoidance of illicit drug use  Dental Health: discussed importance of regular tooth brushing, flossing, and dental visits  Injury prevention: discussed safety/seat belts, safety helmets, smoke detectors, carbon dioxide detectors, and smoking near bedding or upholstery  Sexual health: discussed sexually transmitted diseases, partner selection, use of condoms, avoidance of unintended pregnancy, and contraceptive alternatives  · Exercise: the importance of regular exercise/physical activity was discussed  Recommend exercise 3-5 times per week for at least 30 minutes  · On Ozempic for DM and weight loss through weight loss center, can continue for patient if A1C is within normal range, will get this done         Return in 1 year (on 2023)  Chief Complaint:     Chief Complaint   Patient presents with    Physical Exam      History of Present Illness:     Adult Annual Physical   Patient here for a comprehensive physical exam  The patient reports no problems  Diet and Physical Activity  · Diet/Nutrition: well balanced diet  · Exercise: walking and 1-2 times a week on average  Depression Screening  PHQ-2/9 Depression Screening         General Health  · Sleep: sleeps well and gets 7-8 hours of sleep on average  · Hearing: normal - bilateral   · Vision: goes for regular eye exams, most recent eye exam >1 year ago and wears glasses     · Dental: regular dental visits and brushes teeth twice daily        /GYN Health  · Patient is: premenopausal  · Menses - regular for 3 months  · Mammo - 24       Review of Systems:     Review of Systems   Constitutional: Negative  HENT: Negative  Eyes: Negative  Respiratory: Negative  Cardiovascular: Negative  Gastrointestinal: Negative  Endocrine: Negative  Genitourinary: Negative  Musculoskeletal: Negative  Skin: Negative  Allergic/Immunologic: Negative  Neurological: Negative  Hematological: Negative  Psychiatric/Behavioral: Negative         Past Medical History:     Past Medical History:   Diagnosis Date    Diabetes mellitus ([address] )     DJD (degenerative joint disease)     Eczema     GERD (gastroesophageal reflux disease)     History of gestational diabetes     Hydronephrosis     last assessed 17 documented resolved 17    Kidney stones 2020    Menorrhagia with regular cycle 2018    treated with Lysteda    Nephrolithiasis     Obesity     Seasonal allergies     Varicella       Past Surgical History:     Past Surgical History:   Procedure Laterality Date     SECTION      X 2  and      CYSTOSCOPY      DILATION AND CURETTAGE OF UTERUS      x 2 for missed     PA CYSTO/URETERO W/LITHOTRIPSY &INDWELL STENT INSRT Right 2016    Procedure: CYSTOSCOPY; URETEROSCOPY; HOLMIUM LASER LITHOTRIPSY; BASKET STONE EXTRACTION; RETROGRADE PYELOGRAM; STENT PLACEMENT ;  Surgeon: Varun Mazariegos MD;  Location: [facility];  Service: Urology    PA CYSTO/URETERO W/LITHOTRIPSY &INDWELL STENT INSRT Left 2018    Procedure: CYSTOSCOPY, LEFT URETEROSCOPY AND RENAL ENDOSCOPY WITH LITHOTRIPSY OF RENAL CALCULUS WITH HOLMIUM LASER,  BILATERAL RETROGRADE PYELOGRAM AND INSERTION OF LEFT URETERAL STENT;  Surgeon: Marlene Akins MD;  Location: [facility];  Service: Urology    TONSILLECTOMY      TUBAL LIGATION      at time of     WISDOM TOOTH EXTRACTION        Social History: Social History     Socioeconomic History    Marital status: /Civil Union     Spouse name: Tony Brewer Number of children: 2    Years of education: None    Highest education level:  Bachelor's degree (e g , BA, AB, BS)   Occupational History    Occupation: RN   Tobacco Use    Smoking status: Never Smoker    Smokeless tobacco: Never Used   Vaping Use    Vaping Use: Never used   Substance and Sexual Activity    Alcohol use: Yes     Comment: rare    Drug use: No    Sexual activity: Yes     Partners: Male     Birth control/protection: Female Sterilization     Comment: lifetime partners: 6; current partner: 2005   Other Topics Concern    None   Social History Narrative    Islam: Anabaptism    Accepts blood products        Exercise: once weekly    Calcium: 1 cheese weekly     Social Determinants of Health     Financial Resource Strain: Not on file   Food Insecurity: Not on file   Transportation Needs: Not on file   Physical Activity: Not on file   Stress: Not on file   Social Connections: Not on file   Intimate Partner Violence: Not on file   Housing Stability: Not on file      Family History:     Family History   Problem Relation Age of Onset    Diabetes Mother     Heart attack Father     Heart disease Father         myocardial infarction    Hyperlipidemia Father     Hypertension Father     Coronary artery disease Father     Cancer Father 54        malignant neoplasm of urinary bladder    Multiple sclerosis Sister     COPD Maternal Grandmother     Liver cancer Paternal Grandmother 79    Pancreatic cancer Paternal Grandmother 79    Diabetes Paternal Grandmother     Stomach cancer Paternal Grandfather         unknown age   Osker Ok No Known Problems Maternal Grandfather     Endometrial cancer Paternal Aunt 59    No Known Problems Son     No Known Problems Son     No Known Problems Sister     No Known Problems Maternal Aunt     No Known Problems Maternal Aunt     Breast cancer Neg Hx     Ovarian cancer Neg Hx     Colon cancer Neg Hx       Current Medications:     Current Outpatient Medications   Medication Sig Dispense Refill    Calcium Carb-Cholecalciferol (CALCIUM/VITAMIN D PO) Take 1 tablet by mouth every other day      famotidine (PEPCID) 20 mg tablet Take 20 mg by mouth daily      hydrochlorothiazide (HYDRODIURIL) 25 mg tablet Take 1 tablet (25 mg total) by mouth daily 90 tablet 3    magnesium oxide (MAG-OX) 400 mg Take 1 tablet (400 mg total) by mouth daily 90 tablet 3    omeprazole (PriLOSEC) 20 mg delayed release capsule Take 1 capsule (20 mg total) by mouth every morning 90 capsule 6    potassium citrate (UROCIT-K) 10 mEq Take 1 tablet (10 mEq total) by mouth 2 (two) times a day (Patient taking differently: Take 10 mEq by mouth in the morning) 180 tablet 3    Semaglutide, 1 MG/DOSE, 2 MG/1 5ML SOPN Inject 1mg under skin once weekly 9 mL 1     No current facility-administered medications for this visit  Allergies:     No Known Allergies   Physical Exam:     /80   Pulse 96   Resp 16   Ht 5' 2" (1 575 m)   Wt 102 kg (224 lb 12 8 oz)   BMI 41 12 kg/m²     Physical Exam  Constitutional:       Appearance: Normal appearance  She is normal weight  HENT:      Head: Normocephalic and atraumatic  Eyes:      Extraocular Movements: Extraocular movements intact  Conjunctiva/sclera: Conjunctivae normal       Pupils: Pupils are equal, round, and reactive to light  Cardiovascular:      Rate and Rhythm: Normal rate and regular rhythm  Pulses: Normal pulses  Heart sounds: Normal heart sounds  Pulmonary:      Effort: Pulmonary effort is normal       Breath sounds: Normal breath sounds  Abdominal:      General: Abdomen is flat  Bowel sounds are normal       Palpations: Abdomen is soft  Musculoskeletal:         General: Normal range of motion  Cervical back: Normal range of motion and neck supple  Lymphadenopathy:      Cervical: No cervical adenopathy  Skin:     General: Skin is warm  Neurological:      General: No focal deficit present  Mental Status: She is alert and oriented to person, place, and time  Psychiatric:         Mood and Affect: Mood normal          Behavior: Behavior normal          Thought Content:  Thought content normal          Judgment: Judgment normal           ANTONINA Aguirre

## 2022-05-23 ENCOUNTER — TELEPHONE (OUTPATIENT)
Dept: ADMINISTRATIVE | Facility: OTHER | Age: 47
End: 2022-05-23

## 2022-05-23 NOTE — TELEPHONE ENCOUNTER
----- Message from Joi Mosquera PA-C sent at 5/20/2022 11:39 AM EDT -----  Diabetic eye exam Dr Mary Logan

## 2022-05-23 NOTE — TELEPHONE ENCOUNTER
Upon review of the In Basket request and the patient's chart, initial outreach has been made via fax, please see Contacts section for details       Thank you  Mckenzie Delgado

## 2022-05-23 NOTE — LETTER
Diabetic Eye Exam Form    Date Requested: 22  Patient: Page Rubio  Patient : 1975   Referring Provider: ANGELITA Fuentes    DIABETIC Eye Exam Date _______________________________    Type of Exam MUST be documented for Diabetic Eye Exams  Please CHECK ONE  Retinal Exam       Dilated Retinal Exam       OCT       Optomap-Iris Exam      Fundus Photography     Left Eye - Please check Retinopathy AND Type or No Retinopathy      Exam did show retinopathy    Exam did not show retinopathy         Mild     Proliferative           Moderate    Severe            None         Right Eye - Please check Retinopathy AND Type or No Retinopathy     Exam did show retinopathy    Exam did not show retinopathy         Mild     Proliferative        Moderate    Severe        None       Comments __________________________________________________________    Practice Providing Exam ______________________________________________    Exam Performed By (print name) _______________________________________      Provider Signature ___________________________________________________    These reports are needed for  compliance  Please fax this completed form and a copy of the Diabetic Eye Exam report to our office located at [place] [address] as soon as possible via [phone] jae Gunter: Phone [phone]  We thank you for your assistance in treating our mutual patient

## 2022-05-23 NOTE — LETTER
Diabetic Eye Exam Form    Date Requested: 22  Patient: Benton Rubio  Patient : 1975   Referring Provider: ANGELITA Li    DIABETIC Eye Exam Date _______________________________    Type of Exam MUST be documented for Diabetic Eye Exams  Please CHECK ONE  Retinal Exam       Dilated Retinal Exam       OCT       Optomap-Iris Exam      Fundus Photography     Left Eye - Please check Retinopathy AND Type or No Retinopathy      Exam did show retinopathy    Exam did not show retinopathy         Mild     Proliferative           Moderate    Severe            None         Right Eye - Please check Retinopathy AND Type or No Retinopathy     Exam did show retinopathy    Exam did not show retinopathy         Mild     Proliferative        Moderate    Severe        None       Comments __________________________________________________________    Practice Providing Exam ______________________________________________    Exam Performed By (print name) _______________________________________      Provider Signature ___________________________________________________    These reports are needed for  compliance  Please fax this completed form and a copy of the Diabetic Eye Exam report to our office located at [place] [address] as soon as possible via [phone] jae Hickey: Phone [phone]  We thank you for your assistance in treating our mutual patient

## 2022-05-27 ENCOUNTER — PATIENT MESSAGE (OUTPATIENT)
Dept: BARIATRICS | Facility: CLINIC | Age: 47
End: 2022-05-27

## 2022-05-27 DIAGNOSIS — E11.9 TYPE 2 DIABETES MELLITUS WITHOUT COMPLICATION, WITHOUT LONG-TERM CURRENT USE OF INSULIN (HCC): Primary | ICD-10-CM

## 2022-05-27 NOTE — TELEPHONE ENCOUNTER
As a follow-up, a second attempt has been made for outreach via fax, please see Contacts section for details      Thank you  Bobbiniesha Waller

## 2022-06-01 ENCOUNTER — APPOINTMENT (OUTPATIENT)
Dept: LAB | Facility: HOSPITAL | Age: 47
End: 2022-06-01
Payer: COMMERCIAL

## 2022-06-01 DIAGNOSIS — E11.9 TYPE 2 DIABETES MELLITUS WITHOUT COMPLICATION, WITHOUT LONG-TERM CURRENT USE OF INSULIN (HCC): ICD-10-CM

## 2022-06-01 DIAGNOSIS — E78.5 DYSLIPIDEMIA: ICD-10-CM

## 2022-06-01 LAB
ALBUMIN SERPL BCP-MCNC: 3.3 G/DL (ref 3.5–5)
ALP SERPL-CCNC: 63 U/L (ref 46–116)
ALT SERPL W P-5'-P-CCNC: 18 U/L (ref 12–78)
ANION GAP SERPL CALCULATED.3IONS-SCNC: 7 MMOL/L (ref 4–13)
AST SERPL W P-5'-P-CCNC: 14 U/L (ref 5–45)
BILIRUB SERPL-MCNC: 0.5 MG/DL (ref 0.2–1)
BUN SERPL-MCNC: 20 MG/DL (ref 5–25)
CALCIUM ALBUM COR SERPL-MCNC: 9.4 MG/DL (ref 8.3–10.1)
CALCIUM SERPL-MCNC: 8.8 MG/DL (ref 8.3–10.1)
CHLORIDE SERPL-SCNC: 101 MMOL/L (ref 100–108)
CHOLEST SERPL-MCNC: 140 MG/DL
CO2 SERPL-SCNC: 32 MMOL/L (ref 21–32)
CREAT SERPL-MCNC: 0.79 MG/DL (ref 0.6–1.3)
EST. AVERAGE GLUCOSE BLD GHB EST-MCNC: 137 MG/DL
GFR SERPL CREATININE-BSD FRML MDRD: 90 ML/MIN/1.73SQ M
GLUCOSE P FAST SERPL-MCNC: 101 MG/DL (ref 65–99)
HBA1C MFR BLD: 6.4 %
HDLC SERPL-MCNC: 37 MG/DL
LDLC SERPL CALC-MCNC: 90 MG/DL (ref 0–100)
POTASSIUM SERPL-SCNC: 3.8 MMOL/L (ref 3.5–5.3)
PROT SERPL-MCNC: 6.9 G/DL (ref 6.4–8.2)
SODIUM SERPL-SCNC: 140 MMOL/L (ref 136–145)
TRIGL SERPL-MCNC: 63 MG/DL

## 2022-06-01 PROCEDURE — 80061 LIPID PANEL: CPT

## 2022-06-01 PROCEDURE — 36415 COLL VENOUS BLD VENIPUNCTURE: CPT

## 2022-06-01 PROCEDURE — 80053 COMPREHEN METABOLIC PANEL: CPT

## 2022-06-01 PROCEDURE — 83036 HEMOGLOBIN GLYCOSYLATED A1C: CPT

## 2022-06-02 ENCOUNTER — OFFICE VISIT (OUTPATIENT)
Dept: BARIATRICS | Facility: CLINIC | Age: 47
End: 2022-06-02

## 2022-06-02 VITALS — HEIGHT: 62 IN | BODY MASS INDEX: 41.44 KG/M2 | WEIGHT: 225.2 LBS

## 2022-06-02 DIAGNOSIS — R63.5 ABNORMAL WEIGHT GAIN: Primary | ICD-10-CM

## 2022-06-02 PROCEDURE — RECHECK: Performed by: DIETITIAN, REGISTERED

## 2022-06-02 RX ORDER — SEMAGLUTIDE 2.68 MG/ML
2 INJECTION, SOLUTION SUBCUTANEOUS WEEKLY
Qty: 0.75 ML | Refills: 1 | Status: SHIPPED | OUTPATIENT
Start: 2022-06-02 | End: 2022-08-05

## 2022-06-08 NOTE — TELEPHONE ENCOUNTER
Please call patient and let her know her last diabetic eye exam was July 2020 and she is over due  Can you please schedule that  Thank you

## 2022-06-08 NOTE — TELEPHONE ENCOUNTER
Upon review of the In Basket request we have found as a result of outreach that patient did not have the requested item(s) completed  Per practice, last appointment was July 2020  Any additional questions or concerns should be emailed to the Practice Liaisons via [email or web address] email, please do not reply via In Basket      Thank you  Silvino Tyler

## 2022-06-17 ENCOUNTER — OFFICE VISIT (OUTPATIENT)
Dept: BARIATRICS | Facility: CLINIC | Age: 47
End: 2022-06-17
Payer: COMMERCIAL

## 2022-06-17 VITALS
SYSTOLIC BLOOD PRESSURE: 128 MMHG | DIASTOLIC BLOOD PRESSURE: 70 MMHG | TEMPERATURE: 98.2 F | HEIGHT: 62 IN | HEART RATE: 96 BPM | WEIGHT: 221.6 LBS | BODY MASS INDEX: 40.78 KG/M2

## 2022-06-17 DIAGNOSIS — E11.9 TYPE 2 DIABETES MELLITUS WITHOUT COMPLICATION, WITHOUT LONG-TERM CURRENT USE OF INSULIN (HCC): ICD-10-CM

## 2022-06-17 DIAGNOSIS — E66.01 OBESITY, CLASS III, BMI 40-49.9 (MORBID OBESITY) (HCC): Primary | ICD-10-CM

## 2022-06-17 PROCEDURE — 99214 OFFICE O/P EST MOD 30 MIN: CPT | Performed by: FAMILY MEDICINE

## 2022-06-17 NOTE — PROGRESS NOTES
Assessment/Plan:  Jennifer Love was seen today for follow-up  Diagnoses and all orders for this visit:    Obesity, Class III, BMI 40-49 9 (morbid obesity) (HCC)    Type 2 diabetes mellitus without complication, without long-term current use of insulin (HCC)    Currently following conservative program     Weight has improved  Diabetes has improved based on recent A1c of 6 4 percent down from 7 6 percent  Continue Ozempic  Goals:  Food log (ie ) www myfitnesspal com,sparkpeople  com,loseit com,calorieking  com,etc  , No sugary beverages  At least 64oz of water daily  , Increase physical activity by 10 minutes daily, Gradually increase physical activity to a goal of 5 days per week for 30 minutes of MODERATE intensity PLUS 2 days per week of FULL BODY resistance training and 1583-1588 calories per day    Total time spent: 30 minutes with >50% face-to-face time with the patient  Follow up in approximately 3 months with Non-Surgical Physician/Advanced Practitioner  Subjective:   Chief Complaint   Patient presents with    Follow-up     MWM f/u        Patient ID: Stefania Rubio  is a 41 y o  female with excess weight/obesity here to pursue weight management  Patient is pursuing Conservative Program    Most recent notes and records were reviewed  Patient presents for medical weight management follow-up  Since her last visit she has improved her diet slightly and has started exercising about two weeks ago  Currently on Ozempic 2 mg weekly as patient also has diabetes  Recently had an A1c  Tolerating medication well  Denies nausea, vomiting or constipation    Thought about surgery but not interested at this time     -Initial weight loss goal of 5-10% weight loss for improved health  Wt Readings from Last 10 Encounters:   06/17/22 101 kg (221 lb 9 6 oz)   06/02/22 102 kg (225 lb 3 2 oz)   05/20/22 102 kg (224 lb 12 8 oz)   04/27/22 105 kg (231 lb 11 2 oz)   04/06/22 104 kg (228 lb 15 2 oz)   03/31/22 108 kg (237 lb 3 2 oz)   03/17/22 107 kg (235 lb)   03/16/22 106 kg (233 lb 6 4 oz)   03/10/22 107 kg (235 lb)   03/03/22 107 kg (235 lb 9 6 oz)     Initial weight: 238 5lbs  Current weight: 221 6lbs  Change in weight: -16 9lbs      B- toast w/ PB or eggs  S- no  L- leftovers or pizza  S- protein shake  D- protein, veggie and starch  S- smoothie or cheese  Drinks- 64oz water daily, occ tea, occ soda  Alcohol- no  Exercise- started walking on treadmill daily for 30-45min      The following portions of the patient's history were reviewed and updated as appropriate: allergies, current medications, past family history, past medical history, past social history, past surgical history, and problem list     Review of Systems   Constitutional: Negative for fever  Respiratory: Negative for shortness of breath  Cardiovascular: Negative for chest pain  Gastrointestinal: Negative for abdominal pain, constipation, nausea and vomiting  Objective:  /70 (BP Location: Left arm, Patient Position: Sitting, Cuff Size: Standard)   Pulse 96   Temp 98 2 °F (36 8 °C) (Tympanic)   Ht 5' 2" (1 575 m)   Wt 101 kg (221 lb 9 6 oz)   BMI 40 53 kg/m²   Constitutional: Well-developed, well-nourished and Obese Body mass index is 40 53 kg/m²  Johnathon Hilliard HEENT: No conjunctival injection  Pulmonary: No increased work of breathing or signs of respiratory distress  CV: Well-perfused  GI: Obese  Non-distended  MSK: No edema   Neuro: Oriented to person, place and time  Normal Speech  Normal gait  Psych: Normal affect and mood       Labs and Imaging  Most recent labs and imaging reviewed

## 2022-07-14 ENCOUNTER — OFFICE VISIT (OUTPATIENT)
Dept: BARIATRICS | Facility: CLINIC | Age: 47
End: 2022-07-14

## 2022-07-14 VITALS — HEIGHT: 62 IN | BODY MASS INDEX: 40.5 KG/M2 | WEIGHT: 220.1 LBS

## 2022-07-14 DIAGNOSIS — R63.5 ABNORMAL WEIGHT GAIN: Primary | ICD-10-CM

## 2022-07-14 PROCEDURE — RECHECK: Performed by: DIETITIAN, REGISTERED

## 2022-07-14 NOTE — PROGRESS NOTES
Weight Management Medical Nutrition Assessment  Sherice presented for 3/3 RD Bundle   Today's weight is 231 7#  She has had a 11 6# wieght loss the the past 8 weeks and overall has lost 16# in the past 5 months  She just returned from vacation and stated she attempted to be mindufl of portion sizes and food choices  She will resume food logging  Her physical activity level is low  Discussed pre planning and pre logging her night snacks  Reviewed her low calorie meal plan  Ozempic on 2mg      Patient seen by Medical Provider in past 6 months:  yes  Requested to schedule appointment with Medical Provider: No        Anthropometric Measurements  Start Weight (#): 236  1#    (2/17/22)       Current Weight (#): 220 1#  TBW % Change from start weight:7%  Ideal Body Weight (#):110#  Goal Weight (#):STG: under 200#      Weight Loss History  Previous weight loss attempts: Commercial Programs (Weight Watchers, [employer]own, etc )  Self Created Diets (Portion Control, Healthy Food Choices, etc )     Food and Nutrition Related History        Part-time SDS 2x      Food Recall  Breakfast:2x Toast with PB  Or bowl of cereal - measured or Egg Burrito   Snack:granola bar or almonds or skip   Lunch: Lean Cuisine    Snack:granola bar - [employer] chocolate   Dinner:Lean protein/rice- large portions  / veggies   Dine out -higher calories   Snack:goldfish/ salty carbs         Beverages: water  Volume of beverage intake: 60oz     Weekends: Same  Cravings: salty carbs/ crunchy - sweets over Easter  Trouble area of day:night snacking     Frequency of Eating out: every 3 days  Food restrictions:none reported  Cooking: self   Food Shopping: self     Physical Activity Intake  Activity:Walk - 30 minutes -  has a treadmill at [employer]  Frequency:rarely  Physical limitations/barriers to exercise: none reported     Estimated Needs  Energy  Bear Fabiana Energy Needs:  BMR : 1597 calories   1-2# loss weekly sedentary: 916-1416 calories              1-2# loss weekly lightly active:8258-3314 calories   Maintenance calories for sedentary activity level:1916 calories   Protein: 60-75gm    (1 2-1 5g/kg IBW)  Fluid: 58oz     (35mL/kg IBW)     Nutrition Diagnosis  Yes;    Overweight/obesity  related to Excess energy intake as evidenced by  BMI more than normative standard for age and sex (obesity-grade III 36+)     Nutrition Intervention     Nutrition Prescription  Calories:1400 calories and flex to 1600 calories on walk day  Protein:60-785gm  Fluid:58oz     Meal Plan (Ortega/Pro/Carb)  Breakfast: 200-300/20/30  Snack:  Lunch: 300-400/30/30-45  Snack: 100-150/>5/20  Dinner: 300-400/30/30-45  Snack: 100-150/>5/20  Snack: 100-150/>5/20     Nutrition Education:    Healthy Core Manual  Calorie controlled menu  Lean protein food choices  Healthy snack options  Food journaling tips        Nutrition Counseling:  Strategies: meal planning, portion sizes, healthy snack choices, hydration, fiber intake, protein intake, exercise, food journal        Monitoring and Evaluation:  Evaluation criteria:  Energy Intake  Meet protein needs  Maintain adequate hydration  Monitor weekly weight  Meal planning/preparation  Food journal   Decreased portions at mealtimes and snacks  Physical activity      Barriers to learning:none  Readiness to change: Preparation:  (Getting ready to change)   Comprehension: good  Expected Compliance: good

## 2022-08-05 DIAGNOSIS — E66.01 OBESITY, CLASS III, BMI 40-49.9 (MORBID OBESITY) (HCC): Primary | ICD-10-CM

## 2022-08-05 DIAGNOSIS — E11.9 TYPE 2 DIABETES MELLITUS WITHOUT COMPLICATION, WITHOUT LONG-TERM CURRENT USE OF INSULIN (HCC): ICD-10-CM

## 2022-08-05 RX ORDER — SEMAGLUTIDE 2.68 MG/ML
2 INJECTION, SOLUTION SUBCUTANEOUS WEEKLY
Qty: 3 ML | Refills: 1 | Status: SHIPPED | OUTPATIENT
Start: 2022-08-05 | End: 2022-09-02

## 2022-08-05 RX ORDER — SEMAGLUTIDE 2.68 MG/ML
2 INJECTION, SOLUTION SUBCUTANEOUS WEEKLY
Qty: 0.75 ML | Refills: 1 | OUTPATIENT
Start: 2022-08-05

## 2022-08-30 NOTE — PROGRESS NOTES
Weight Management Medical Nutrition Assessment  Sherice presented for 1/12 Employee RD Bundle   Today's weight is 214 1#  She has had a 6# wieght loss the the past 6 weeks and overall has lost 22# in the past 6 months  She is not formally food logging but stated she attempted to be mindufl of portion sizes and food choices  She is calculating calories at meal or snack  Her physical activity level is low  Discussed pre planning and pre logging her night snacks  Reviewed her low calorie meal plan  Ozempic on 2mg      Patient seen by Medical Provider in past 6 months:  yes  Requested to schedule appointment with Medical Provider: No        Anthropometric Measurements  Start Weight (#): 236  1#    (2/17/22)       Current Weight (#): 214 1#  TBW % Change from start weight:9 3%  Ideal Body Weight (#):110#  Goal Weight (#):STG: under 200#      Weight Loss History  Previous weight loss attempts: Commercial Programs (Weight Watchers, [employer], etc )  Self Created Diets (Portion Control, Healthy Food Choices, etc )     Food and Nutrition Related History        Part-time SDS 2x      Food Recall  Breakfast:2x Toast with 2 Eggs    Snack:granola bar or almonds or skip (150)  Lunch: Lean Cuisine  Work: 600-800 calories Larger meal but no snack between   Snack:granola bar - [employer] chocolate (150)  Umair Little protein/rice/ veggies -  smaller  portions    Snack:goldfish/ salty carbs- measured         Beverages: water  Volume of beverage intake: 60oz     Weekends: Same  Cravings: salty carbs/ crunchy   Trouble area of day:night snacking     Frequency of Eating out: every 3 days  Food restrictions:none reported  Cooking: self   Food Shopping: self     Physical Activity Intake  Activity:Walk - 30 minutes - has a treadmill at home   Frequency:rarely  Physical limitations/barriers to exercise: none reported     Estimated Needs  Energy  Bear Fabiana Energy Needs:  BMR : 1569 calories   1-2# loss weekly sedentary: 883-1383 calories              1-2# loss weekly lightly active:6318-7555 calories   Maintenance calories for sedentary activity level:1883 calories   Protein: 60-75gm    (1 2-1 5g/kg IBW)  Fluid: 58oz     (35mL/kg IBW)     Nutrition Diagnosis  Yes;    Overweight/obesity  related to Excess energy intake as evidenced by  BMI more than normative standard for age and sex (obesity-grade III 36+)     Nutrition Intervention     Nutrition Prescription  Calories:1400 calories and flex to 1600 calories on walk day  Protein:60-785gm  Fluid:58oz     Meal Plan (Ortega/Pro/Carb)  Breakfast: 200-300/20/30  Snack:  Lunch: 300-400/30/30-45  Snack: 100-150/>5/20  Dinner: 300-400/30/30-45  Snack: 100-150/>5/20  Snack: 100-150/>5/20     Nutrition Education:    Calorie controlled menu  Lean protein food choices  Healthy snack options  Food journaling tips        Nutrition Counseling:  Strategies: meal planning, portion sizes, healthy snack choices, hydration, fiber intake, protein intake, exercise, food journal        Monitoring and Evaluation:  Evaluation criteria:  Energy Intake  Meet protein needs  Maintain adequate hydration  Monitor weekly weight  Meal planning/preparation  Food journal   Decreased portions at mealtimes and snacks  Physical activity      Barriers to learning:none  Readiness to change: Preparation:  (Getting ready to change)   Comprehension: good  Expected Compliance: good

## 2022-08-31 ENCOUNTER — OFFICE VISIT (OUTPATIENT)
Dept: BARIATRICS | Facility: CLINIC | Age: 47
End: 2022-08-31
Payer: COMMERCIAL

## 2022-08-31 VITALS — WEIGHT: 214.1 LBS | HEIGHT: 62 IN | BODY MASS INDEX: 39.4 KG/M2

## 2022-08-31 DIAGNOSIS — E66.01 OBESITY, CLASS III, BMI 40-49.9 (MORBID OBESITY) (HCC): ICD-10-CM

## 2022-08-31 PROCEDURE — RECHECK: Performed by: DIETITIAN, REGISTERED

## 2022-08-31 PROCEDURE — S9470 NUTRITIONAL COUNSELING, DIET: HCPCS | Performed by: DIETITIAN, REGISTERED

## 2022-09-02 DIAGNOSIS — E66.01 OBESITY, CLASS III, BMI 40-49.9 (MORBID OBESITY) (HCC): ICD-10-CM

## 2022-09-02 DIAGNOSIS — E11.9 TYPE 2 DIABETES MELLITUS WITHOUT COMPLICATION, WITHOUT LONG-TERM CURRENT USE OF INSULIN (HCC): ICD-10-CM

## 2022-09-02 RX ORDER — SEMAGLUTIDE 1.34 MG/ML
1 INJECTION, SOLUTION SUBCUTANEOUS WEEKLY
Qty: 3 ML | Refills: 0 | Status: SHIPPED | OUTPATIENT
Start: 2022-09-02 | End: 2022-09-22

## 2022-09-14 ENCOUNTER — TELEPHONE (OUTPATIENT)
Dept: NEPHROLOGY | Facility: CLINIC | Age: 47
End: 2022-09-14

## 2022-09-22 ENCOUNTER — OFFICE VISIT (OUTPATIENT)
Dept: BARIATRICS | Facility: CLINIC | Age: 47
End: 2022-09-22
Payer: COMMERCIAL

## 2022-09-22 VITALS
HEART RATE: 100 BPM | SYSTOLIC BLOOD PRESSURE: 110 MMHG | WEIGHT: 211.7 LBS | HEIGHT: 62 IN | DIASTOLIC BLOOD PRESSURE: 60 MMHG | BODY MASS INDEX: 38.96 KG/M2 | RESPIRATION RATE: 16 BRPM

## 2022-09-22 DIAGNOSIS — E66.9 OBESITY, CLASS II, BMI 35-39.9: ICD-10-CM

## 2022-09-22 DIAGNOSIS — E11.69 DIABETES MELLITUS TYPE 2 IN OBESE (HCC): ICD-10-CM

## 2022-09-22 DIAGNOSIS — E66.9 DIABETES MELLITUS TYPE 2 IN OBESE (HCC): ICD-10-CM

## 2022-09-22 DIAGNOSIS — E66.01 OBESITY, CLASS III, BMI 40-49.9 (MORBID OBESITY) (HCC): Primary | ICD-10-CM

## 2022-09-22 PROCEDURE — 99214 OFFICE O/P EST MOD 30 MIN: CPT | Performed by: FAMILY MEDICINE

## 2022-09-22 NOTE — PROGRESS NOTES
Assessment/Plan:  Anushka Dennis was seen today for follow-up  Diagnoses and all orders for this visit:    Obesity, Class III, BMI 40-49 9 (morbid obesity) ([address] )  -     Semaglutide-Weight Management (WEGOVY) 1 7 MG/0 75ML; Inject 0 75 mL (1 7 mg total) under the skin once a week    Obesity, Class II, BMI 35-39 9  -     Semaglutide-Weight Management (WEGOVY) 1 7 MG/0 75ML; Inject 0 75 mL (1 7 mg total) under the skin once a week    Diabetes mellitus type 2 in obese (HCC)  -     Semaglutide-Weight Management (WEGOVY) 1 7 MG/0 75ML; Inject 0 75 mL (1 7 mg total) under the skin once a week  -     HEMOGLOBIN A1C W/ EAG ESTIMATION; Future    Patient appears to be doing quite well in her weight loss journey  She continues to lose weight  She previously with obesity class three and now she has with obesity class two  Patient was congratulated  She is currently using Ozempic to assist in some weight loss as she is also with type 2 diabetes  I have advised switching to [facility] LARISA and she was in agreement  Check an A1c as well to monitor glucose control  Continue with dietitian  Goals:  Food log (ie ) www myfitnesspal com,sparkpeople  com,loseit com,calorieking  com,etc  ,   No sugary beverages  At least 64oz of water daily  ,  Increase physical activity by 10 minutes daily,   Gradually increase physical activity to a goal of 5 days per week for 30 minutes of MODERATE intensity PLUS 2 days per week of FULL BODY resistance training and   1355-6950 calories per day  Switch from Ozempic to [facility] LARISA  Continue with Dietician  Cut back on processed carbohydrates such as chips and pizza    Total time spent: 30 minutes with >50% face-to-face time with the patient  Follow up in approximately 3 months with Non-Surgical Physician/Advanced Practitioner      Subjective:   Chief Complaint   Patient presents with    Follow-up     MWM 3mth f/u; waist 44in       Patient ID: Vee Rubio  is a 41 y o  female with excess weight/obesity here to pursue weight management  Patient is pursuing Conservative Program    Most recent notes and records were reviewed  Patient presents for medical weight management follow-up  Currently on Ozempic 1 mg weekly  Was previously on Ozempic 2 mg weekly but there was a shortage  Has been meeting with the dietitian  Has been able to lose additional weight since her last visit  No new concerns     -Initial weight loss goal of 5-10% weight loss for improved health  Wt Readings from Last 10 Encounters:   09/22/22 96 kg (211 lb 11 2 oz)   08/31/22 97 1 kg (214 lb 1 6 oz)   07/14/22 99 8 kg (220 lb 1 6 oz)   06/17/22 101 kg (221 lb 9 6 oz)   06/02/22 102 kg (225 lb 3 2 oz)   05/20/22 102 kg (224 lb 12 8 oz)   04/27/22 105 kg (231 lb 11 2 oz)   04/06/22 104 kg (228 lb 15 2 oz)   03/31/22 108 kg (237 lb 3 2 oz)   03/17/22 107 kg (235 lb)     Initial weight: 238 5lbs  Current weight: 211 7lbs (-9 9lbs)  Change in weight: -26 8lbs      B- toast w/ PB or eggs  S- no  L- leftovers or pizza  S- fruit cup  D- protein, veggie and starch  S- popcorn or chips but portioned  Drinks- 64oz water daily, occ tea, occ soda  Alcohol- no  Exercise- track at [facility] 2 day/wk 4 x around the track      The following portions of the patient's history were reviewed and updated as appropriate: allergies, current medications, past family history, past medical history, past social history, past surgical history, and problem list     Review of Systems   Constitutional: Negative for fever  Respiratory: Negative for shortness of breath  Cardiovascular: Negative for chest pain  Gastrointestinal: Negative for abdominal pain, constipation, nausea and vomiting  Objective:  /60   Pulse 100   Resp 16   Ht 5' 2" (1 575 m)   Wt 96 kg (211 lb 11 2 oz)   Breastfeeding No   BMI 38 72 kg/m²   Constitutional: Well-developed, well-nourished and Obese Body mass index is 38 72 kg/m²  Jorge Lovelace   HEENT: No conjunctival injection  Pulmonary: No increased work of breathing or signs of respiratory distress  CV: Well-perfused  GI: Obese  Non-distended  MSK: No edema   Neuro: Oriented to person, place and time  Normal Speech  Normal gait  Psych: Normal affect and mood  Labs and Imaging  Recent labs and imaging have been personally reviewed    Lab Results   Component Value Date    WBC 8 62 12/30/2019    HGB 12 3 12/30/2019    HCT 39 7 12/30/2019    MCV 84 12/30/2019     12/30/2019     Lab Results   Component Value Date     (L) 01/23/2014    SODIUM 140 06/01/2022    K 3 8 06/01/2022     06/01/2022    CO2 32 06/01/2022    ANIONGAP 8 01/23/2014    AGAP 7 06/01/2022    BUN 20 06/01/2022    CREATININE 0 79 06/01/2022    GLUC 187 (H) 03/01/2021    GLUF 101 (H) 06/01/2022    CALCIUM 8 8 06/01/2022    AST 14 06/01/2022    ALT 18 06/01/2022    ALKPHOS 63 06/01/2022    PROT 6 6 01/23/2014    TP 6 9 06/01/2022    BILITOT 0 21 01/23/2014    TBILI 0 50 06/01/2022    EGFR 90 06/01/2022     Lab Results   Component Value Date    HGBA1C 6 4 (H) 06/01/2022     Lab Results   Component Value Date    FMK0YXGUBOHK 1 790 12/30/2019     Lab Results   Component Value Date    CHOLESTEROL 140 06/01/2022     Lab Results   Component Value Date    HDL 37 (L) 06/01/2022     Lab Results   Component Value Date    TRIG 63 06/01/2022     Lab Results   Component Value Date    LDLCALC 90 06/01/2022

## 2022-09-22 NOTE — PATIENT INSTRUCTIONS
Goals: Food log (ie ) www myfitnesspal com,sparkpeople  com,loseit com,calorieking  com,etc  ,   No sugary beverages  At least 64oz of water daily  ,  Increase physical activity by 10 minutes daily,   Gradually increase physical activity to a goal of 5 days per week for 30 minutes of MODERATE intensity PLUS 2 days per week of FULL BODY resistance training and   7894-9071 calories per day  Switch from [facility] to [facility] LARISA  Continue with Dietician  Cut back on processed carbohydrates such as chips and pizza

## 2022-10-07 DIAGNOSIS — E11.69 DIABETES MELLITUS TYPE 2 IN OBESE (HCC): ICD-10-CM

## 2022-10-07 DIAGNOSIS — E66.9 OBESITY, CLASS II, BMI 35-39.9: ICD-10-CM

## 2022-10-07 DIAGNOSIS — E66.9 DIABETES MELLITUS TYPE 2 IN OBESE (HCC): ICD-10-CM

## 2022-10-07 DIAGNOSIS — E66.01 OBESITY, CLASS III, BMI 40-49.9 (MORBID OBESITY) (HCC): Primary | ICD-10-CM

## 2022-10-07 RX ORDER — SEMAGLUTIDE 1.34 MG/ML
1 INJECTION, SOLUTION SUBCUTANEOUS WEEKLY
Qty: 3 ML | Refills: 0 | Status: SHIPPED | OUTPATIENT
Start: 2022-10-07 | End: 2022-10-27

## 2022-10-25 NOTE — PROGRESS NOTES
Weight Management Medical Nutrition Assessment  Sherice presented for 2/12 Employee RD Bundle   Today's weight is 212 7#  She has had a 1 7# wieght gain the the past month  and overall has lost 23 4# in the past 8 months  She is attempting to manually food log an keep in the 1400 calorie range but stated she knows she has exceeded that range the past few weeks  She statded she has recently been struggling with stress eating late afternoon and evening  We discussed pre plan and pre logging her afternoon snacks  Her physical activity level is low- dicussed preplan 2x per weekend when time allows  Reviewed her low calorie meal plan  Ozempic on 1 mg     Patient seen by Medical Provider in past 6 months:  yes  Requested to schedule appointment with Medical Provider: No        Anthropometric Measurements  Start Weight (#): 236  1#    (2/17/22)       Current Weight (#):212 7#  TBW % Change from start weight:10%  Ideal Body Weight (#):110#  Goal Weight (#):STG: under 200#      Weight Loss History  Previous weight loss attempts: Commercial Programs (Weight Watchers, [employer], etc )  Self Created Diets (Portion Control, Healthy Food Choices, etc )     Food and Nutrition Related History        Part-time SDS 2x      Food Recall  Breakfast:2x Toast with 2 Eggs    Snack:granola bar or almonds or skip (150)  Gus Font: 600-800 calories Larger meal but no snack between   Snack:granola bar - [employer] chocolate (150)- salty carbs chips  Dinner:Lean protein/rice/ veggies - measured  portions    Snack:goldfish/ salty carbs- unmeasured         Beverages: water  Volume of beverage intake: 60oz     Weekends: Same  Cravings: salty carbs/ crunchy   Trouble area of day:night snacking     Frequency of Eating out: every 3 days  Food restrictions:none reported  Cooking: self   Food Shopping: self     Physical Activity Intake  Activity:Walk - 30 minutes - has a treadmill at [facility]  Frequency:rarely  Physical limitations/barriers to exercise: none reported     Estimated Needs  Energy  Mitzi 1898 Energy Needs:  BMR : 1569 calories   1-2# loss weekly sedentary: 883-1383 calories              1-2# loss weekly lightly active:3125-2361 calories   Maintenance calories for sedentary activity level:1883 calories   Protein: 60-75gm    (1 2-1 5g/kg IBW)  Fluid: 58oz     (35mL/kg IBW)     Nutrition Diagnosis  Yes;    Overweight/obesity  related to Excess energy intake as evidenced by  BMI more than normative standard for age and sex (obesity-grade II 38 2)     Nutrition Intervention     Nutrition Prescription  Calories:1400 calories and flex to 1600 calories on walk day  Protein:60-785gm  Fluid:58oz     Meal Plan (Ortega/Pro/Carb)  Breakfast: 200-300/20/30  Snack:  Lunch: 300-400/30/30-45  Snack: 100-150/>5/20  Dinner: 300-400/30/30-45  Snack: 100-150/>5/20  Snack: 100-150/>5/20     Nutrition Education:    Calorie controlled menu  Lean protein food choices  Healthy snack options  Food journaling tips        Nutrition Counseling:  Strategies: meal planning, portion sizes, healthy snack choices, hydration, fiber intake, protein intake, exercise, food journal        Monitoring and Evaluation:  Evaluation criteria:  Energy Intake  Meet protein needs  Maintain adequate hydration  Monitor weekly weight  Meal planning/preparation  Food journal   Decreased portions at mealtimes and snacks  Physical activity      Barriers to learning:none  Readiness to change: Preparation:  (Getting ready to change)   Comprehension: good  Expected Compliance: good

## 2022-10-26 ENCOUNTER — OFFICE VISIT (OUTPATIENT)
Dept: BARIATRICS | Facility: CLINIC | Age: 47
End: 2022-10-26

## 2022-10-26 VITALS — HEIGHT: 62 IN | BODY MASS INDEX: 39.14 KG/M2 | WEIGHT: 212.7 LBS

## 2022-10-26 DIAGNOSIS — R63.5 ABNORMAL WEIGHT GAIN: Primary | ICD-10-CM

## 2022-10-26 PROCEDURE — RECHECK: Performed by: DIETITIAN, REGISTERED

## 2022-10-27 DIAGNOSIS — E66.9 OBESITY, CLASS II, BMI 35-39.9: ICD-10-CM

## 2022-10-27 DIAGNOSIS — E66.9 DIABETES MELLITUS TYPE 2 IN OBESE (HCC): ICD-10-CM

## 2022-10-27 DIAGNOSIS — E11.69 DIABETES MELLITUS TYPE 2 IN OBESE (HCC): ICD-10-CM

## 2022-10-27 DIAGNOSIS — E66.01 OBESITY, CLASS III, BMI 40-49.9 (MORBID OBESITY) (HCC): ICD-10-CM

## 2022-10-27 DIAGNOSIS — R63.5 ABNORMAL WEIGHT GAIN: Primary | ICD-10-CM

## 2022-11-15 ENCOUNTER — APPOINTMENT (OUTPATIENT)
Dept: LAB | Facility: HOSPITAL | Age: 47
End: 2022-11-15

## 2022-11-15 DIAGNOSIS — E11.69 DIABETES MELLITUS TYPE 2 IN OBESE (HCC): ICD-10-CM

## 2022-11-15 DIAGNOSIS — E66.9 DIABETES MELLITUS TYPE 2 IN OBESE (HCC): ICD-10-CM

## 2022-11-16 ENCOUNTER — TELEPHONE (OUTPATIENT)
Dept: BARIATRICS | Facility: CLINIC | Age: 47
End: 2022-11-16

## 2022-11-16 LAB
EST. AVERAGE GLUCOSE BLD GHB EST-MCNC: 120 MG/DL
HBA1C MFR BLD: 5.8 %

## 2022-11-16 NOTE — TELEPHONE ENCOUNTER
----- Message from Laurie Winters DO sent at 11/16/2022  8:20 AM EST -----  Hemoglobin A1c remains elevated but improved  Keep upcoming weight management appointment which is scheduled for 1/12/2023

## 2022-12-07 ENCOUNTER — PREPPED CHART (OUTPATIENT)
Dept: URBAN - METROPOLITAN AREA CLINIC 6 | Facility: CLINIC | Age: 47
End: 2022-12-07

## 2022-12-07 ENCOUNTER — ESTABLISHED COMPREHENSIVE EXAM (OUTPATIENT)
Dept: URBAN - METROPOLITAN AREA CLINIC 6 | Facility: CLINIC | Age: 47
End: 2022-12-07

## 2022-12-07 DIAGNOSIS — Z01.00: ICD-10-CM

## 2022-12-07 PROCEDURE — 92014 COMPRE OPH EXAM EST PT 1/>: CPT

## 2022-12-07 PROCEDURE — 92015 DETERMINE REFRACTIVE STATE: CPT

## 2022-12-07 ASSESSMENT — TONOMETRY
OS_IOP_MMHG: 10
OD_IOP_MMHG: 18

## 2022-12-07 ASSESSMENT — VISUAL ACUITY
OU_CC: J1+
OD_CC: 20/60
OS_CC: 20/25-1
OD_PH: 20/30

## 2022-12-08 NOTE — PROGRESS NOTES
Weight Management Medical Nutrition Assessment  Sherice presented for 3/12 Employee RD Bundle   Today's weight is 207 9#  She has lost 4 8# in the the past 6 weeks and overall has lost 28 2 # in the past 10 months  She is manually food logging and attempting to stay in the 1400 range but knows there have been day she has been in the 7419-8173 calorie range She statded she has recently been struggling with stress eating late afternoon and evening  We discussed pre plan and pre logging her afternoon snacks  Her physical activity level is low- dicussed preplan 2x per weekend when time allows  Reviewed her low calorie meal plan  Wegovy started 10/27 lower dose- tolerating well      Patient seen by Medical Provider in past 6 months:  yes  Requested to schedule appointment with Medical Provider: No        Anthropometric Measurements  Start Weight (#): 236  1#    (2/17/22)       Current Weight (#):207 9#  TBW % Change from start weight:12%  Ideal Body Weight (#):110#  Goal Weight (#):STG: under 200#      Weight Loss History  Previous weight loss attempts: Commercial Programs (Weight Watchers, Vik Umana, etc )  Self Created Diets (Portion Control, Healthy Food Choices, etc )     Food and Nutrition Related History        Part-time SDS 2x      Food Recall  Breakfast:2x Toast with 2 Eggs    Snack:granola bar or almonds or skip (150)  Lunch: Lean Cuisine or Pizza (400) Work: 600-calories   Snack:granola bar - [employer] chocolate (150)- salty carbs chips  Dinner:Lean protein/rice/ veggies - measured  portions    Snack:goldfish/ salty carbs- unmeasured         Beverages: water  Volume of beverage intake: 60oz     Weekends: Same  Cravings: salty carbs/ crunchy   Trouble area of day:afternoon and night snacking     Frequency of Eating out: every 3 days  Food restrictions:none reported  Cooking: self   Food Shopping: self     Physical Activity Intake  Activity:Walk - 30 minutes - has a treadmill at home   Frequency:rarely  Physical limitations/barriers to exercise: none reported     Estimated Needs  Energy  Mitzi 1898 Energy Needs:  BMR : 1541 calories   1-2# loss weekly sedentary: 850-1350 calories              1-2# loss weekly lightly active:3532-1912 calories   Maintenance calories for sedentary activity level:1850 calories   Protein: 60-75gm    (1 2-1 5g/kg IBW)  Fluid: 58oz     (35mL/kg IBW)     Nutrition Diagnosis  Yes;    Overweight/obesity  related to Excess energy intake as evidenced by  BMI more than normative standard for age and sex (obesity-grade II 38 9)     Nutrition Intervention     Nutrition Prescription  Calories:1400 calories and flex to 1600 calories on walk day  Protein:60-785gm  Fluid:58oz     Meal Plan (Ortega/Pro/Carb)  Breakfast: 200-300/20/30  Snack:  Lunch: 300-400/30/30-45  Snack: 100-150/>5/20  Dinner: 300-400/30/30-45  Snack: 100-150/>5/20  Snack: 100-150/>5/20     Nutrition Education:    Calorie controlled menu  Lean protein food choices  Healthy snack options  Food journaling tips        Nutrition Counseling:  Strategies: meal planning, portion sizes, healthy snack choices, hydration, fiber intake, protein intake, exercise, food journal        Monitoring and Evaluation:  Evaluation criteria:  Energy Intake  Meet protein needs  Maintain adequate hydration  Monitor weekly weight  Meal planning/preparation  Food journal   Decreased portions at mealtimes and snacks  Physical activity      Barriers to learning:none  Readiness to change: Preparation:  (Getting ready to change)   Comprehension: good  Expected Compliance: good

## 2022-12-12 ENCOUNTER — OFFICE VISIT (OUTPATIENT)
Dept: BARIATRICS | Facility: CLINIC | Age: 47
End: 2022-12-12

## 2022-12-12 DIAGNOSIS — E66.9 OBESITY, CLASS II, BMI 35-39.9: ICD-10-CM

## 2022-12-12 DIAGNOSIS — E66.9 OBESITY (BMI 35.0-39.9 WITHOUT COMORBIDITY): ICD-10-CM

## 2023-01-05 ENCOUNTER — TELEPHONE (OUTPATIENT)
Dept: BARIATRICS | Facility: CLINIC | Age: 48
End: 2023-01-05

## 2023-01-05 DIAGNOSIS — E66.9 OBESITY, CLASS II, BMI 35-39.9: ICD-10-CM

## 2023-01-05 DIAGNOSIS — E11.69 DIABETES MELLITUS TYPE 2 IN OBESE (HCC): ICD-10-CM

## 2023-01-05 DIAGNOSIS — E66.9 DIABETES MELLITUS TYPE 2 IN OBESE (HCC): ICD-10-CM

## 2023-01-05 DIAGNOSIS — E66.01 OBESITY, CLASS III, BMI 40-49.9 (MORBID OBESITY) (HCC): ICD-10-CM

## 2023-01-05 NOTE — TELEPHONE ENCOUNTER
Spoke to patient she would like to go up to the 2 4mg so that she can continue to stay on the medication

## 2023-01-05 NOTE — TELEPHONE ENCOUNTER
Ozempic was discontinued on 10/27/2022 by you and the [facility]FORT LARISA was prescribed on 10/27/2022 by you

## 2023-01-05 NOTE — TELEPHONE ENCOUNTER
Yes, I do see that now  Please inform patient of the denial and the reason why  How would she like to proceed? She does have an upcoming appointment on 1/12/2023

## 2023-01-12 ENCOUNTER — OFFICE VISIT (OUTPATIENT)
Dept: BARIATRICS | Facility: CLINIC | Age: 48
End: 2023-01-12

## 2023-01-12 VITALS
HEART RATE: 96 BPM | SYSTOLIC BLOOD PRESSURE: 110 MMHG | RESPIRATION RATE: 16 BRPM | BODY MASS INDEX: 37.61 KG/M2 | HEIGHT: 62 IN | WEIGHT: 204.4 LBS | DIASTOLIC BLOOD PRESSURE: 62 MMHG

## 2023-01-12 DIAGNOSIS — E66.9 OBESITY, CLASS II, BMI 35-39.9: Primary | ICD-10-CM

## 2023-01-12 DIAGNOSIS — E66.9 DIABETES MELLITUS TYPE 2 IN OBESE (HCC): ICD-10-CM

## 2023-01-12 DIAGNOSIS — E11.69 DIABETES MELLITUS TYPE 2 IN OBESE (HCC): ICD-10-CM

## 2023-01-12 DIAGNOSIS — K21.9 GASTROESOPHAGEAL REFLUX DISEASE, UNSPECIFIED WHETHER ESOPHAGITIS PRESENT: ICD-10-CM

## 2023-01-12 DIAGNOSIS — E11.9 TYPE 2 DIABETES MELLITUS WITHOUT COMPLICATION, WITHOUT LONG-TERM CURRENT USE OF INSULIN (HCC): ICD-10-CM

## 2023-01-12 DIAGNOSIS — N20.0 NEPHROLITHIASIS: ICD-10-CM

## 2023-01-12 DIAGNOSIS — E66.01 OBESITY, CLASS III, BMI 40-49.9 (MORBID OBESITY) (HCC): ICD-10-CM

## 2023-01-12 PROBLEM — E66.812 OBESITY, CLASS II, BMI 35-39.9: Status: ACTIVE | Noted: 2023-01-12

## 2023-01-12 NOTE — ASSESSMENT & PLAN NOTE
- Patient is pursuing Conservative Program with bundles with the dietician    - Initial weight loss goal of 5-10% weight loss for improved health  - Not a Topamax candidate due to history of kidney stones  - Patient denies personal history of pancreatitis  Patient also denies personal and family history of medullary thyroid cancer and multiple endocrine neoplasia type 2 (MEN 2 tumor)  - Ozempic started 2/2022 at weight of 238 5 lbs   - Ozempic changed to [facility] end of September 2022 at weight of 211 8 lbs to help with weight loss  - Recently increased from 1 7 mg to 2 4 mg Wegovy  Continue Wegovy 2 4 mg weekly - tolerating well and helping with appetite  - Labs reviewed: A1C 11/15/2022 5 8  Initial: 238 5 lbs  Current: 204 4 lbs BMI 37 39  Change: -34 1 lbs    Goals:  Do not skip meals  Continue food logging  Keep up the great work with water intake - at least 64 oz daily  Keep up the great work with exercise  Continue nutrition recommendations per the dietician     Calories:1400 calories and flex to 1600 calories on walk day  Continue [facility]

## 2023-01-12 NOTE — ASSESSMENT & PLAN NOTE
- May improve with weight loss and lifestyle modification          Lab Results   Component Value Date    HGBA1C 5 8 (H) 11/15/2022

## 2023-01-12 NOTE — ASSESSMENT & PLAN NOTE
- Taking pepcid  May improve with weight loss and lifestyle modification  Continue management with prescribing provider

## 2023-01-12 NOTE — PROGRESS NOTES
Assessment/Plan:     Obesity, Class II, BMI 35-39 9  - Patient is pursuing Conservative Program with bundles with the dietician    - Initial weight loss goal of 5-10% weight loss for improved health  - Not a Topamax candidate due to history of kidney stones  - Patient denies personal history of pancreatitis  Patient also denies personal and family history of medullary thyroid cancer and multiple endocrine neoplasia type 2 (MEN 2 tumor)  - Ozempic started 2/2022 at weight of 238 5 lbs   - Ozempic changed to [facility] end of September 2022 at weight of 211 8 lbs to help with weight loss  - Recently increased from 1 7 mg to 2 4 mg Wegovy  Continue Wegovy 2 4 mg weekly - tolerating well and helping with appetite  - Labs reviewed: A1C 11/15/2022 5 8  Initial: 238 5 lbs  Current: 204 4 lbs BMI 37 39  Change: -34 1 lbs    Goals:  Do not skip meals  Continue food logging  Keep up the great work with water intake - at least 64 oz daily  Keep up the great work with exercise  Continue nutrition recommendations per the dietician  Calories:1400 calories and flex to 1600 calories on walk day  Continue Wegovy      GERD (gastroesophageal reflux disease)  - Taking pepcid  May improve with weight loss and lifestyle modification  Continue management with prescribing provider  Type 2 diabetes mellitus without complication, without long-term current use of insulin [facility])  - May improve with weight loss and lifestyle modification  Lab Results   Component Value Date    HGBA1C 5 8 (H) 11/15/2022       Nephrolithiasis  - Taking HCTZ  Continue management with prescribing provider  Christoph Tarango was seen today for follow-up  Diagnoses and all orders for this visit:    Obesity, Class II, BMI 35-39 9  -     Semaglutide-Weight Management (WEGOVY) 2 4 MG/0 75ML;  Inject 0 75 mL (2 4 mg total) under the skin once a week    Obesity, Class III, BMI 40-49 9 (morbid obesity) ([facility] [address] )    Diabetes mellitus type 2 in obese (HCC)  -     Semaglutide-Weight Management (WEGOVY) 2 4 MG/0 75ML; Inject 0 75 mL (2 4 mg total) under the skin once a week    Gastroesophageal reflux disease, unspecified whether esophagitis present    Type 2 diabetes mellitus without complication, without long-term current use of insulin ([facility] [address] )    Nephrolithiasis        Follow up in approximately 2 months with Non-Surgical Physician/Advanced Practitioner  Subjective:   Chief Complaint   Patient presents with   • Follow-up     MWM 3mth f/u; waist 44in       Patient ID: Mariama Rubio  is a 41 y o  female with excess weight/obesity here to pursue weight management  Patient is pursuing Conservative Program with bundles with the dietician  Most recent notes and records were reviewed  HPI    Wt Readings from Last 10 Encounters:   01/12/23 92 7 kg (204 lb 6 4 oz)   10/26/22 96 5 kg (212 lb 11 2 oz)   09/22/22 96 kg (211 lb 11 2 oz)   08/31/22 97 1 kg (214 lb 1 6 oz)   07/14/22 99 8 kg (220 lb 1 6 oz)   06/17/22 101 kg (221 lb 9 6 oz)   06/02/22 102 kg (225 lb 3 2 oz)   05/20/22 102 kg (224 lb 12 8 oz)   04/27/22 105 kg (231 lb 11 2 oz)   04/06/22 104 kg (228 lb 15 2 oz)     Recently increased Wegovy from 1 7 to 2 4 mg weekly  No negative side effects  Helping with appetite  Having cravings for salty and crunchy foods, but managing well  Not as many carb cravings  Has been logging calories  Getting 0747-9385 calories per day  Hydration- 8-10 glasses of water, iced tea once a week  Alcohol- 1 drink per month  Exercise- treadmill walking every other day for 45-60 minutes   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[employer]   Sleep- 7 hours, but fragmented  Discussed sleep medicine referral, but wants to hold off  Colonoscopy: due, she is interested in cologuard and will follow-up with her PCP  Mammogram: due next month, she is seeing gyn later this month will obtain order from them         The following portions of the patient's history were reviewed and updated as appropriate: allergies, current medications, past family history, past medical history, past social history, past surgical history, and problem list     Family History   Problem Relation Age of Onset   • Diabetes Mother    • Heart attack Father    • Heart disease Father         myocardial infarction   • Hyperlipidemia Father    • Hypertension Father    • Coronary artery disease Father    • Cancer Father 54        malignant neoplasm of urinary bladder   • Multiple sclerosis Sister    • COPD Maternal Grandmother    • Liver cancer Paternal Grandmother 75   • Pancreatic cancer Paternal Grandmother 79   • Diabetes Paternal Grandmother    • Stomach cancer Paternal Grandfather         unknown age   • No Known Problems Maternal Grandfather    • Endometrial cancer Paternal Aunt 59   • No Known Problems Son    • No Known Problems Son    • No Known Problems Sister    • No Known Problems Maternal Aunt    • No Known Problems Maternal Aunt    • Breast cancer Neg Hx    • Ovarian cancer Neg Hx    • Colon cancer Neg Hx         Review of Systems   HENT: Negative for sore throat  Respiratory: Negative for cough and shortness of breath  Cardiovascular: Negative for chest pain and palpitations  Gastrointestinal: Positive for abdominal pain (advised to follow-up with urgent care or PCP if it does not improve)  Negative for constipation, diarrhea, nausea and vomiting         + GERD   Musculoskeletal: Negative for arthralgias and back pain  Skin: Negative for rash  Psychiatric/Behavioral: Negative for suicidal ideas (or HI)  Denies depression and anxiety       Objective:  /62   Pulse 96   Resp 16   Ht 5' 2" (1 575 m)   Wt 92 7 kg (204 lb 6 4 oz)   BMI 37 39 kg/m²     Physical Exam  Vitals and nursing note reviewed  Constitutional   General appearance: Abnormal   well developed and obese  Eyes No conjunctival injection     Ears, Nose, Mouth, and Throat Oral mucosa moist    Pulmonary Respiratory effort: No increased work of breathing or signs of respiratory distress  Cardiovascular     Examination of extremities for edema and/or varicosities: Normal   no edema  Abdomen   Abdomen: Abnormal   The abdomen was obese      Musculoskeletal   Normal range of motion  Neurological   Gait and station: Normal     Psychiatric   Orientation to person, place and time: Normal     Affect: appropriate

## 2023-01-23 ENCOUNTER — ANNUAL EXAM (OUTPATIENT)
Dept: OBGYN CLINIC | Facility: CLINIC | Age: 48
End: 2023-01-23

## 2023-01-23 VITALS
WEIGHT: 204 LBS | DIASTOLIC BLOOD PRESSURE: 64 MMHG | SYSTOLIC BLOOD PRESSURE: 120 MMHG | HEIGHT: 65 IN | BODY MASS INDEX: 33.99 KG/M2 | HEART RATE: 96 BPM

## 2023-01-23 DIAGNOSIS — N92.1 MENORRHAGIA WITH IRREGULAR CYCLE: ICD-10-CM

## 2023-01-23 DIAGNOSIS — Z12.31 ENCOUNTER FOR SCREENING MAMMOGRAM FOR BREAST CANCER: ICD-10-CM

## 2023-01-23 DIAGNOSIS — Z01.411 ENCOUNTER FOR GYNECOLOGICAL EXAMINATION WITH ABNORMAL FINDING: Primary | ICD-10-CM

## 2023-01-23 NOTE — PROGRESS NOTES
Assessment / Plan    1  Encounter for gynecological examination with abnormal finding  Well woman exam  2021 pap/hpv negative  Repeat      2  Encounter for screening mammogram for breast cancer  Order provided for update    - Mammo screening bilateral w 3d & cad; Future    3  Menorrhagia with irregular cycle  Discussed necessity to further evaluate with pelvic US, labs and endometrial sampling  Patient agrees  CBC was already ordered by prior provider     - TSH, 3rd generation with Free T4 reflex; Future  - US pelvis complete w transvaginal; Future          Subjective      Purmela Madai Rubio is a 41 y o  female who presents for her annual gynecologic exam     40 yo   2021 pap/hpv negative  Pap hx NL  STD hx negative  Sexually active, spouse  2022 mammo negative  BC tubal ligation    Periods have been irregular, recently had 2 week intervals x 2, sometimes will skip w/ 2 month intervals  Will bleed 7 days, 2 days "gushing with gigantic clots"  Past hx of menorrhagia with regular cycles and use of lysteda  She is not interested in mgt w/ hormones or LN IUDs  Periods are irregular  Current contraception: tubal ligation  History of abnormal Pap smear: no  Family history of breast,uterine, ovarian or colon cancer: yes - endometrial ca in pat half aunt      Menstrual History:  OB History        4    Para   2    Term   2            AB   2    Living   2       SAB   2    IAB        Ectopic        Multiple        Live Births               Obstetric Comments   Menarche: 14    Menses: 29- 56d / x 7d /maxipad every 24 hours for 2 days, and then regular tampon every 2 hours x 2 days and then maxipad once daily thereafter              Patient's last menstrual period was 2023         The following portions of the patient's history were reviewed and updated as appropriate: allergies, current medications, past family history, past medical history, past social history, past surgical history and problem list     Review of Systems      Review of Systems   Constitutional: Negative for chills and fever  Respiratory: Negative for cough and shortness of breath  Gastrointestinal: Negative for abdominal distention, abdominal pain, blood in stool, constipation, diarrhea, nausea and vomiting  Genitourinary: Positive for menstrual problem  Negative for difficulty urinating, dysuria, frequency, genital sores, hematuria, pelvic pain, urgency, vaginal bleeding and vaginal discharge  Musculoskeletal: Negative for arthralgias and myalgias  Breasts:  Negative for skin changes, dimpling, asymmetry, nipple discharge, redness, tenderness or palpable masses    Objective      /64   Pulse 96   Ht 5' 5" (1 651 m)   Wt 92 5 kg (204 lb)   LMP 01/16/2023   BMI 33 95 kg/m²   Physical Exam  Constitutional:       General: She is not in acute distress  Appearance: Normal appearance  She is well-developed  She is not ill-appearing or diaphoretic  Comments: BMI 34   HENT:      Head: Normocephalic and atraumatic  Eyes:      Pupils: Pupils are equal, round, and reactive to light  Neck:      Thyroid: No thyromegaly  Pulmonary:      Effort: Pulmonary effort is normal    Chest:   Breasts:     Breasts are symmetrical       Right: No inverted nipple, mass, nipple discharge, skin change or tenderness  Left: No inverted nipple, mass, nipple discharge, skin change or tenderness  Abdominal:      General: There is no distension  Palpations: Abdomen is soft  There is no mass  Tenderness: There is no abdominal tenderness  There is no guarding or rebound  Genitourinary:     General: Normal vulva  Exam position: Lithotomy position  Labia:         Right: No rash, tenderness, lesion or injury  Left: No rash, tenderness, lesion or injury  Vagina: No signs of injury and foreign body  No vaginal discharge, erythema, tenderness or bleeding        Cervix: No cervical motion tenderness, discharge or friability  Uterus: Not enlarged and not tender  Adnexa:         Right: No mass or tenderness  Left: No mass or tenderness  Musculoskeletal:      Cervical back: Neck supple  Lymphadenopathy:      Cervical: No cervical adenopathy  Upper Body:      Right upper body: No supraclavicular adenopathy  Left upper body: No supraclavicular adenopathy  Skin:     General: Skin is warm and dry  Neurological:      General: No focal deficit present  Mental Status: She is alert and oriented to person, place, and time  Psychiatric:         Mood and Affect: Mood normal          Behavior: Behavior normal          Thought Content:  Thought content normal          Judgment: Judgment normal

## 2023-01-23 NOTE — PATIENT INSTRUCTIONS
Weight Management   WHAT YOU NEED TO KNOW:   Being overweight increases your risk of health conditions such as heart disease, high blood pressure, type 2 diabetes, and certain types of cancer  It can also increase your risk for osteoarthritis, sleep apnea, and other respiratory problems  Aim for a slow, steady weight loss  Even a small amount of weight loss can lower your risk of health problems  DISCHARGE INSTRUCTIONS:   How to lose weight safely:  A safe and healthy way to lose weight is to eat fewer calories and get regular exercise  You can lose up about 1 pound a week by decreasing the number of calories you eat by 500 calories each day  You can decrease calories by eating smaller portion sizes or by cutting out high-calorie foods  Read labels to find out how many calories are in the foods you eat  You can also burn calories with exercise such as walking, swimming, or biking  You will be more likely to keep weight off if you make these changes part of your lifestyle  Exercise at least 30 minutes per day on most days of the week  You can also fit in more physical activity by taking the stairs instead of the elevator or parking farther away from stores  Ask your healthcare provider about the best exercise plan for you  Healthy meal plan for weight management:  A healthy meal plan includes a variety of foods, contains fewer calories, and helps you stay healthy  A healthy meal plan includes the following:     Eat whole-grain foods more often  A healthy meal plan should contain fiber  Fiber is the part of grains, fruits, and vegetables that is not broken down by your body  Whole-grain foods are healthy and provide extra fiber in your diet  Some examples of whole-grain foods are whole-wheat breads and pastas, oatmeal, brown rice, and bulgur  Eat a variety of vegetables every day  Include dark, leafy greens such as spinach, kale, sakina greens, and mustard greens   Eat yellow and orange vegetables such as carrots, sweet potatoes, and winter squash  Eat a variety of fruits every day  Choose fresh or canned fruit (canned in its own juice or light syrup) instead of juice  Fruit juice has very little or no fiber  Eat low-fat dairy foods  Drink fat-free (skim) milk or 1% milk  Eat fat-free yogurt and low-fat cottage cheese  Try low-fat cheeses such as mozzarella and other reduced-fat cheeses  Choose meat and other protein foods that are low in fat  Choose beans or other legumes such as split peas or lentils  Choose fish, skinless poultry (chicken or turkey), or lean cuts of red meat (beef or pork)  Before you cook meat or poultry, cut off any visible fat  Use less fat and oil  Try baking foods instead of frying them  Add less fat, such as margarine, sour cream, regular salad dressing, and mayonnaise to foods  Eat fewer high-fat foods  Some examples of high-fat foods include french fries, doughnuts, ice cream, and cakes  Eat fewer sweets  Limit foods and drinks that are high in sugar  This includes candy, cookies, regular soda, and sweetened drinks  Ways to decrease calories:   Eat smaller portions  Use a small plate with smaller servings  Do not eat second helpings  When you eat at a restaurant, ask for a box and place half of your meal in the box before you eat  Share an entrée with someone else  Replace high-calorie snacks with healthy, low-calorie snacks  Choose fresh fruit, vegetables, fat-free rice cakes, or air-popped popcorn instead of potato chips, nuts, or chocolate  Choose water or calorie-free drinks instead of soda or sweetened drinks  Do not shop for groceries when you are hungry  You may be more likely to make unhealthy food choices  Take a grocery list of healthy foods and shop after you have eaten  Eat regular meals  Do not skip meals  Skipping meals can lead to overeating later in the day  This can make it harder for you to lose weight   Eat a healthy snack in place of a meal if you do not have time to eat a regular meal  Talk with a dietitian to help you create a meal plan and schedule that is right for you  Other things to consider as you try to lose weight:   Be aware of situations that may give you the urge to overeat, such as eating while watching television  Find ways to avoid these situations  For example, read a book, go for a walk, or do crafts  Meet with a weight loss support group or friends who are also trying to lose weight  This may help you stay motivated to continue working on your weight loss goals  © Copyright [employer] 2022 Information is for End User's use only and may not be sold, redistributed or otherwise used for commercial purposes  All illustrations and images included in CareNotes® are the copyrighted property of A D A M , Inc  or Gabrielle Evans  The above information is an  only  It is not intended as medical advice for individual conditions or treatments  Talk to your doctor, nurse or pharmacist before following any medical regimen to see if it is safe and effective for you

## 2023-02-27 ENCOUNTER — APPOINTMENT (OUTPATIENT)
Dept: LAB | Facility: HOSPITAL | Age: 48
End: 2023-02-27
Attending: INTERNAL MEDICINE

## 2023-02-27 DIAGNOSIS — N13.30 HYDRONEPHROSIS OF LEFT KIDNEY: ICD-10-CM

## 2023-02-27 DIAGNOSIS — E87.1 HYPONATREMIA: ICD-10-CM

## 2023-02-27 DIAGNOSIS — N92.1 MENORRHAGIA WITH IRREGULAR CYCLE: ICD-10-CM

## 2023-02-27 DIAGNOSIS — N20.0 NEPHROLITHIASIS: ICD-10-CM

## 2023-02-27 DIAGNOSIS — N13.2 HYDRONEPHROSIS WITH RENAL AND URETERAL CALCULUS OBSTRUCTION: ICD-10-CM

## 2023-02-27 LAB
ANION GAP SERPL CALCULATED.3IONS-SCNC: 6 MMOL/L (ref 4–13)
BASOPHILS # BLD AUTO: 0.06 THOUSANDS/ÂΜL (ref 0–0.1)
BASOPHILS NFR BLD AUTO: 1 % (ref 0–1)
BUN SERPL-MCNC: 21 MG/DL (ref 5–25)
CALCIUM SERPL-MCNC: 9.3 MG/DL (ref 8.4–10.2)
CHLORIDE SERPL-SCNC: 102 MMOL/L (ref 96–108)
CO2 SERPL-SCNC: 29 MMOL/L (ref 21–32)
CREAT SERPL-MCNC: 0.89 MG/DL (ref 0.6–1.3)
EOSINOPHIL # BLD AUTO: 0.11 THOUSAND/ÂΜL (ref 0–0.61)
EOSINOPHIL NFR BLD AUTO: 1 % (ref 0–6)
ERYTHROCYTE [DISTWIDTH] IN BLOOD BY AUTOMATED COUNT: 15.2 % (ref 11.6–15.1)
GFR SERPL CREATININE-BSD FRML MDRD: 78 ML/MIN/1.73SQ M
GLUCOSE SERPL-MCNC: 81 MG/DL (ref 65–140)
HCT VFR BLD AUTO: 38.6 % (ref 34.8–46.1)
HGB BLD-MCNC: 12 G/DL (ref 11.5–15.4)
IMM GRANULOCYTES # BLD AUTO: 0.02 THOUSAND/UL (ref 0–0.2)
IMM GRANULOCYTES NFR BLD AUTO: 0 % (ref 0–2)
LYMPHOCYTES # BLD AUTO: 2.18 THOUSANDS/ÂΜL (ref 0.6–4.47)
LYMPHOCYTES NFR BLD AUTO: 24 % (ref 14–44)
MCH RBC QN AUTO: 25.6 PG (ref 26.8–34.3)
MCHC RBC AUTO-ENTMCNC: 31.1 G/DL (ref 31.4–37.4)
MCV RBC AUTO: 82 FL (ref 82–98)
MONOCYTES # BLD AUTO: 0.79 THOUSAND/ÂΜL (ref 0.17–1.22)
MONOCYTES NFR BLD AUTO: 9 % (ref 4–12)
NEUTROPHILS # BLD AUTO: 6.08 THOUSANDS/ÂΜL (ref 1.85–7.62)
NEUTS SEG NFR BLD AUTO: 65 % (ref 43–75)
NRBC BLD AUTO-RTO: 0 /100 WBCS
PLATELET # BLD AUTO: 343 THOUSANDS/UL (ref 149–390)
PMV BLD AUTO: 11.1 FL (ref 8.9–12.7)
POTASSIUM SERPL-SCNC: 3.7 MMOL/L (ref 3.5–5.3)
RBC # BLD AUTO: 4.69 MILLION/UL (ref 3.81–5.12)
SODIUM SERPL-SCNC: 137 MMOL/L (ref 135–147)
TSH SERPL DL<=0.05 MIU/L-ACNC: 1.19 UIU/ML (ref 0.45–4.5)
WBC # BLD AUTO: 9.24 THOUSAND/UL (ref 4.31–10.16)

## 2023-02-27 RX ORDER — HYDROCHLOROTHIAZIDE 25 MG/1
25 TABLET ORAL DAILY
Qty: 90 TABLET | Refills: 3 | Status: SHIPPED | OUTPATIENT
Start: 2023-02-27

## 2023-02-27 RX ORDER — POTASSIUM CITRATE 10 MEQ/1
10 TABLET, EXTENDED RELEASE ORAL 2 TIMES DAILY
Qty: 180 TABLET | Refills: 3 | Status: SHIPPED | OUTPATIENT
Start: 2023-02-27

## 2023-03-07 ENCOUNTER — APPOINTMENT (OUTPATIENT)
Dept: LAB | Facility: HOSPITAL | Age: 48
End: 2023-03-07
Attending: INTERNAL MEDICINE

## 2023-03-07 LAB
BACTERIA UR QL AUTO: ABNORMAL /HPF
BILIRUB UR QL STRIP: NEGATIVE
CLARITY UR: CLEAR
COLOR UR: YELLOW
GLUCOSE UR STRIP-MCNC: NEGATIVE MG/DL
HGB UR QL STRIP.AUTO: ABNORMAL
KETONES UR STRIP-MCNC: NEGATIVE MG/DL
LEUKOCYTE ESTERASE UR QL STRIP: NEGATIVE
NITRITE UR QL STRIP: NEGATIVE
NON-SQ EPI CELLS URNS QL MICRO: ABNORMAL /HPF
PH UR STRIP.AUTO: 6 [PH]
PROT UR STRIP-MCNC: NEGATIVE MG/DL
RBC #/AREA URNS AUTO: ABNORMAL /HPF
SP GR UR STRIP.AUTO: 1.02 (ref 1–1.03)
UROBILINOGEN UR STRIP-ACNC: <2 MG/DL
WBC #/AREA URNS AUTO: ABNORMAL /HPF

## 2023-03-15 ENCOUNTER — OFFICE VISIT (OUTPATIENT)
Dept: BARIATRICS | Facility: CLINIC | Age: 48
End: 2023-03-15

## 2023-03-15 VITALS
DIASTOLIC BLOOD PRESSURE: 60 MMHG | HEIGHT: 65 IN | BODY MASS INDEX: 33.45 KG/M2 | HEART RATE: 82 BPM | SYSTOLIC BLOOD PRESSURE: 114 MMHG | RESPIRATION RATE: 16 BRPM | WEIGHT: 200.8 LBS

## 2023-03-15 DIAGNOSIS — E11.9 TYPE 2 DIABETES MELLITUS WITHOUT COMPLICATION, WITHOUT LONG-TERM CURRENT USE OF INSULIN (HCC): ICD-10-CM

## 2023-03-15 DIAGNOSIS — E11.69 DIABETES MELLITUS TYPE 2 IN OBESE (HCC): ICD-10-CM

## 2023-03-15 DIAGNOSIS — E66.9 OBESITY, CLASS II, BMI 35-39.9: Primary | ICD-10-CM

## 2023-03-15 DIAGNOSIS — K21.9 GASTROESOPHAGEAL REFLUX DISEASE, UNSPECIFIED WHETHER ESOPHAGITIS PRESENT: ICD-10-CM

## 2023-03-15 DIAGNOSIS — N20.0 NEPHROLITHIASIS: ICD-10-CM

## 2023-03-15 DIAGNOSIS — E66.9 DIABETES MELLITUS TYPE 2 IN OBESE (HCC): ICD-10-CM

## 2023-03-15 NOTE — ASSESSMENT & PLAN NOTE
- May improve with weight loss and lifestyle modification        Lab Results   Component Value Date    HGBA1C 5 8 (H) 11/15/2022
- Patient is pursuing Conservative Program with bundles with the dietician    - Initial weight loss goal of 5-10% weight loss for improved health  - S/P tubal ligation  - Not a Topamax candidate due to history of kidney stones  - Patient denies personal history of pancreatitis  Patient also denies personal and family history of medullary thyroid cancer and multiple endocrine neoplasia type 2 (MEN 2 tumor)  - Ozempic started 2/2022 at weight of 238 5 lbs   - [facility] end of September 2022 at weight of 211 8 lbs to help with weight loss  - Continue Wegovy 2 4 mg weekly - tolerating well and helping with appetite  - Labs reviewed: BMP, CBC, and TSH 2/27/2023 were all within acceptable range  Initial: 238 5 lbs  Last visit: 204 4 lbs BMI 37 39  Current: 200 8 lbs BMI 33 41  Change: -37 7 lbs (-3 6 lbs since last office visit)    Goals:  Do not skip meals  Continue food logging  Increase water intake to at least 64 oz daily  Continue with plan to start walking, gradually increase to goal of 5 days per week for 30 minutes  Continue nutrition recommendations per the dietician     Calories:1400 calories and flex to 1600 calories on walk day  Continue [facility]
- Taking HCTZ  Continue management with prescribing provider 
- Taking pepcid  May improve with weight loss and lifestyle modification  Continue management with prescribing provider 
normal...

## 2023-03-15 NOTE — PROGRESS NOTES
Assessment/Plan:     Obesity, Class II, BMI 35-39 9  - Patient is pursuing Conservative Program with bundles with the dietician    - Initial weight loss goal of 5-10% weight loss for improved health  - S/P tubal ligation  - Not a Topamax candidate due to history of kidney stones  - Patient denies personal history of pancreatitis  Patient also denies personal and family history of medullary thyroid cancer and multiple endocrine neoplasia type 2 (MEN 2 tumor)  - Ozempic started 2/2022 at weight of 238 5 lbs   - Ozempic changed to [facility] end of September 2022 at weight of 211 8 lbs to help with weight loss  - Continue Wegovy 2 4 mg weekly - tolerating well and helping with appetite  - Labs reviewed: BMP, CBC, and TSH 2/27/2023 were all within acceptable range  Initial: 238 5 lbs  Last visit: 204 4 lbs BMI 37 39  Current: 200 8 lbs BMI 33 41  Change: -37 7 lbs (-3 6 lbs since last office visit)    Goals:  Do not skip meals  Continue food logging  Increase water intake to at least 64 oz daily  Continue with plan to start walking, gradually increase to goal of 5 days per week for 30 minutes  Continue nutrition recommendations per the dietician  Calories:1400 calories and flex to 1600 calories on walk day  Continue Wegovy    GERD (gastroesophageal reflux disease)  - Taking pepcid  May improve with weight loss and lifestyle modification  Continue management with prescribing provider  Type 2 diabetes mellitus without complication, without long-term current use of insulin [facility])  - May improve with weight loss and lifestyle modification  Lab Results   Component Value Date    HGBA1C 5 8 (H) 11/15/2022       Nephrolithiasis  - Taking HCTZ  Continue management with prescribing provider  Alvarado Smith was seen today for follow-up  Diagnoses and all orders for this visit:    Obesity, Class II, BMI 35-39 9  -     Semaglutide-Weight Management (WEGOVY) 2 4 MG/0 75ML;  Inject 0 75 mL (2 4 mg total) under the skin once a week    Diabetes mellitus type 2 in obese (HCC)  -     Semaglutide-Weight Management (WEGOVY) 2 4 MG/0 75ML; Inject 0 75 mL (2 4 mg total) under the skin once a week    Gastroesophageal reflux disease, unspecified whether esophagitis present    Type 2 diabetes mellitus without complication, without long-term current use of insulin ([address] )    Nephrolithiasis        Follow up in approximately 3-4 months with Non-Surgical Physician/Advanced Practitioner  Subjective:   Chief Complaint   Patient presents with   • Follow-up     MWM 2mth f/u; waist 42in       Patient ID: Maddie Rubio  is a 41 y o  female with excess weight/obesity here to pursue weight management  Patient is pursuing Conservative Program with bundles with the dietician  Most recent notes and records were reviewed  HPI    Wt Readings from Last 10 Encounters:   03/15/23 91 1 kg (200 lb 12 8 oz)   01/23/23 92 5 kg (204 lb)   01/12/23 92 7 kg (204 lb 6 4 oz)   10/26/22 96 5 kg (212 lb 11 2 oz)   09/22/22 96 kg (211 lb 11 2 oz)   08/31/22 97 1 kg (214 lb 1 6 oz)   07/14/22 99 8 kg (220 lb 1 6 oz)   06/17/22 101 kg (221 lb 9 6 oz)   06/02/22 102 kg (225 lb 3 2 oz)   05/20/22 102 kg (224 lb 12 8 oz)     Taking Wegovy 2 4 mg weekly  No negative side effects  Helping with appetite  Manolo Cardoso helping with cravings  Has been logging calories  Getting about 1700 calories per day       Hydration- 5-6 glasses of water, iced tea once a week  Alcohol- 1 drink per month  Exercise- none, but plan on starting to walk with friends   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[address]   Sleep- 7 hours, but fragmented      Colonoscopy: nigel, she is interested in cologuard and will follow-up with her PCP  Mammogram: nigel, has order, encouraged to schedule       The following portions of the patient's history were reviewed and updated as appropriate: allergies, current medications, past family history, past medical history, past social history, past surgical history, and problem list     Family History   Problem Relation Age of Onset   • Diabetes Mother    • Heart attack Father    • Heart disease Father         myocardial infarction   • Hyperlipidemia Father    • Hypertension Father    • Coronary artery disease Father    • Cancer Father 54        malignant neoplasm of urinary bladder   • Multiple sclerosis Sister    • COPD Maternal Grandmother    • Liver cancer Paternal Grandmother 75   • Pancreatic cancer Paternal Grandmother 79   • Diabetes Paternal Grandmother    • Stomach cancer Paternal Grandfather         unknown age   • No Known Problems Maternal Grandfather    • Endometrial cancer Paternal Aunt 59   • No Known Problems Son    • No Known Problems Son    • No Known Problems Sister    • No Known Problems Maternal Aunt    • No Known Problems Maternal Aunt    • Breast cancer Neg Hx    • Ovarian cancer Neg Hx    • Colon cancer Neg Hx         Review of Systems   HENT: Negative for sore throat  Respiratory: Negative for cough and shortness of breath  Cardiovascular: Negative for chest pain and palpitations  Gastrointestinal: Negative for abdominal pain, constipation, diarrhea, nausea and vomiting         + GERD somewhat controlled with medication, better bed elevation    Musculoskeletal: Positive for arthralgias (intermittent)  Negative for back pain  Skin: Negative for rash  Psychiatric/Behavioral: Negative for suicidal ideas (or HI)  Denies depression and anxiety       Objective:  /60   Pulse 82   Resp 16   Ht 5' 5" (1 651 m)   Wt 91 1 kg (200 lb 12 8 oz)   BMI 33 41 kg/m²     Physical Exam  Vitals and nursing note reviewed  Constitutional   General appearance: Abnormal   well developed and obese  Eyes No conjunctival injection  Ears, Nose, Mouth, and Throat Oral mucosa moist    Pulmonary   Respiratory effort: No increased work of breathing or signs of respiratory distress       Cardiovascular     Examination of extremities for edema and/or varicosities: Normal   no edema  Abdomen   Abdomen: Abnormal   The abdomen was obese      Musculoskeletal   Normal range of motion  Neurological   Gait and station: Normal     Psychiatric   Orientation to person, place and time: Normal     Affect: appropriate

## 2023-03-17 LAB
AMMONIA 24H UR-MRATE: 33 MEQ/24 HR
AMMONIA UR-SCNC: ABNORMAL UG/DL
CA H2 PHOS DIHYD CRY URNS QL MICRO: 1.61 RATIO (ref 0–3)
CALCIUM 24H UR-MCNC: 15.3 MG/DL
CALCIUM 24H UR-MRATE: 214.2 MG/24 HR (ref 0–320)
CHLORIDE 24H UR-SCNC: 138 MMOL/L
CHLORIDE 24H UR-SRATE: 193 MMOL/24 HR (ref 38–210)
CITRATE 24H UR-MCNC: 324 MG/L
CITRATE 24H UR-MRATE: 454 MG/24 HR (ref 320–1240)
COM CRY STONE QL IR: 2.93 RATIO (ref 0–6)
CREAT 24H UR-MCNC: 81.1 MG/DL
CREAT 24H UR-MRATE: 1135.4 MG/24 HR (ref 800–1800)
CYSTINE 24H UR-MCNC: 5.76 MG/L
CYSTINE 24H UR-MRATE: 8.06 MG/24 HR (ref 2.1–58)
MAGNESIUM 24H UR-MRATE: 45 MG/24 HR (ref 12–293)
MAGNESIUM UR-MCNC: 3.2 MG/DL
NA URATE CRY STONE QL IR: 5.62 RATIO (ref 0–4)
OSMOLALITY UR: 639 MOSMOL/KG (ref 300–900)
OXALATE 24H UR-MRATE: 11 MG/24 HR (ref 4–31)
OXALATE UR-MCNC: 8 MG/L
PH 24H UR: 5.9 [PH] (ref 4.5–8)
PHOSPHATE 24H UR-MCNC: 60.9 MG/DL
PHOSPHATE 24H UR-MRATE: 852.6 MG/24 HR (ref 261–1078)
PLEASE NOTE (STONE RISK): ABNORMAL
POTASSIUM 24H UR-SCNC: 44.5 MMOL/24 HR (ref 14–95)
POTASSIUM UR-SCNC: 31.8 MMOL/L
PRESERVED URINE: 1400 ML/24 HR (ref 600–1600)
SODIUM 24H UR-SCNC: 149 MMOL/L
SODIUM 24H UR-SRATE: 209 MMOL/24 HR (ref 39–258)
SPECIMEN VOL 24H UR: 1400 ML/24 HR (ref 600–1600)
SULFATE 24H UR-MCNC: 29 MEQ/24 HR (ref 0–30)
SULFATE UR-MCNC: 21 MEQ/L
TRI-PHOS CRY STONE MICRO: 0.01 RATIO (ref 0–1)
URATE 24H UR-MCNC: 38.4 MG/DL
URATE 24H UR-MRATE: 538 MG/24 HR (ref 174–902)
URATE DIHYD CRY STONE QL IR: 1.89 RATIO (ref 0–1.2)

## 2023-03-22 ENCOUNTER — OFFICE VISIT (OUTPATIENT)
Dept: NEPHROLOGY | Facility: CLINIC | Age: 48
End: 2023-03-22

## 2023-03-22 VITALS
HEIGHT: 65 IN | BODY MASS INDEX: 33.66 KG/M2 | WEIGHT: 202 LBS | DIASTOLIC BLOOD PRESSURE: 66 MMHG | HEART RATE: 98 BPM | SYSTOLIC BLOOD PRESSURE: 116 MMHG | OXYGEN SATURATION: 97 %

## 2023-03-22 DIAGNOSIS — N13.30 HYDRONEPHROSIS OF LEFT KIDNEY: ICD-10-CM

## 2023-03-22 DIAGNOSIS — N20.0 NEPHROLITHIASIS: Primary | ICD-10-CM

## 2023-03-22 DIAGNOSIS — E87.1 HYPONATREMIA: ICD-10-CM

## 2023-03-22 DIAGNOSIS — N13.2 HYDRONEPHROSIS WITH RENAL AND URETERAL CALCULUS OBSTRUCTION: ICD-10-CM

## 2023-03-22 NOTE — PATIENT INSTRUCTIONS
Kidney Stones   AMBULATORY CARE:   Kidney stones  form in the urinary system when the water and waste in your urine are out of balance  When this happens, certain types of waste crystals separate from the urine  The crystals build up and form kidney stones  Kidney stones can be made of uric acid, calcium, phosphate, or oxalate crystals  You may have 1 or more kidney stones  Common symptoms include the following:   Pain in the middle of your back that moves across to your side or that may spread to your groin    Nausea and vomiting    Urge to urinate often, burning feeling when you urinate, or pink or red urine    Tenderness in your lower back, side, or stomach    Seek care immediately if:   You are vomiting and it is not relieved with medicine  Call your doctor or kidney specialist if:   You have a fever  You have trouble urinating  You see blood in your urine  You have severe pain  You have any questions or concerns about your condition or care  Treatment for kidney stones  may include any of the following:  NSAIDs , such as ibuprofen, help decrease swelling, pain, and fever  This medicine is available with or without a doctor's order  NSAIDs can cause stomach bleeding or kidney problems in certain people  If you take blood thinner medicine, always ask your healthcare provider if NSAIDs are safe for you  Always read the medicine label and follow directions  Acetaminophen  decreases pain and fever  It is available without a doctor's order  Ask how much to take and how often to take it  Follow directions  Read the labels of all other medicines you are using to see if they also contain acetaminophen, or ask your doctor or pharmacist  Acetaminophen can cause liver damage if not taken correctly  Prescription pain medicine  may be given  Ask your healthcare provider how to take this medicine safely  Some prescription pain medicines contain acetaminophen   Do not take other medicines that contain acetaminophen without talking to your healthcare provider  Too much acetaminophen may cause liver damage  Prescription pain medicine may cause constipation  Ask your healthcare provider how to prevent or treat constipation  Medicines  to balance your electrolytes may be needed  A procedure or surgery  to remove the kidney stones may be needed if they do not pass on their own  Your treatment will depend on the size and location of your kidney stones  What you can do to manage kidney stones:   Drink more liquids  Your healthcare provider may tell you to drink at least 8 to 12 (eight-ounce) cups of liquids each day  This helps flush out the kidney stones when you urinate  Water is the best liquid to drink  Strain your urine every time you go to the bathroom  Urinate through a strainer or a piece of thin cloth to catch the stones  Take the stones to your healthcare provider so they can be sent to the lab for tests  This will help your healthcare providers plan the best treatment for you  Ask if you should avoid any foods  You may need to limit oxalate  Oxalate is a chemical found in some plant foods  The most common type of kidney stone is made up of crystals that contain calcium and oxalate  Your healthcare provider or dietitian may recommend that you limit oxalate if you get this type of kidney stone often  You may need to limit how much sodium (salt) or protein you eat  Ask for information about the best foods for you  Be physically active as directed  Your stones may pass more easily if you stay active  Physical activity can also help you manage your weight  Ask about the best activities for you  After you pass the kidney stones: Your healthcare provider may  order a 24-hour urine test  Results from a 24-hour urine test will help your healthcare provider plan ways to prevent more stones from forming  Your healthcare provider will give you more instructions    Follow up with your doctor or kidney specialist as directed:  Write down your questions so you remember to ask them during your visits  © Renae [employer] 2022 Information is for End User's use only and may not be sold, redistributed or otherwise used for commercial purposes  The above information is an  only  It is not intended as medical advice for individual conditions or treatments  Talk to your doctor, nurse or pharmacist before following any medical regimen to see if it is safe and effective for you

## 2023-03-22 NOTE — PROGRESS NOTES
OFFICE FOLLOW UP - Nephrology   Claire Arnie Rubio 57 y o  female MRN: [id number]    Encounter: [id number]        ASSESSMENT    57-year-old female with a past medical history the past medical history of nephrolithiasis presents for follow-up evaluation    Nephrolithiasis  Hydronephrosis with renal and ureteral calculus obstruction  Hydronephrosis of left kidney  Hyponatremia  GERD (gastroesophageal reflux disease)  Hypomagnesemia    DISCUSSION & PLAN    1  Nephrolithiasis  -Currently medically stable  -urine volume is low  -Hydrochlorothiazide 25 mg daily  -Potassiums citrate 10 mEq 2 times daily  -We will repeat a renal ultrasound now  -Continue magnesium oxide    2  Hydronephrosis with ureteral calculus obstruction  -We will repeat a renal ultrasound now her last ultrasound was done a year ago    3  Hyponatremia  -Electrolytes, acid-base status stable    4  GERD  -acid reflux improved on pepcid and now Protonix    5  Hypomagnesemia  -Continue magnesium supplement at this point    6  hypokalemia  -Stable on potassiums citrate and thiazide    7  Elevated BMI  -now seeing weight loss center started on G LP 1 agonist , also with an elevated A1c  -Losing weight    Continue yearly 24 hour urine stone risk profile  Renal ultrasound and follow-up in 1 year    HPI: Chester Arnold is a 57 y o  female who is here for follow-up    She has been doing relatively well no recent stone formation, tolerating her medications, losing weight  No chest pain or shortness of breath no fevers or chills no nausea or vomiting    ROS:   Review of systems was otherwise negative    Allergies: Patient has no known allergies      Medications:   Current Outpatient Medications:   •  Calcium Carb-Cholecalciferol (CALCIUM/VITAMIN D PO), Take 1 tablet by mouth every other day, Disp: , Rfl:   •  famotidine (PEPCID) 20 mg tablet, Take 20 mg by mouth daily, Disp: , Rfl:   •  hydrochlorothiazide (HYDRODIURIL) 25 mg tablet, Take 1 tablet (25 mg total) by mouth daily, Disp: 90 tablet, Rfl: 3  •  magnesium oxide (MAG-OX) 400 mg, Take 1 tablet (400 mg total) by mouth daily, Disp: 90 tablet, Rfl: 3  •  potassium citrate (UROCIT-K) 10 mEq, Take 1 tablet (10 mEq total) by mouth 2 (two) times a day, Disp: 180 tablet, Rfl: 3  •  Semaglutide-Weight Management (WEGOVY) 2 4 MG/0 75ML, Inject 0 75 mL (2 4 mg total) under the skin once a week, Disp: 3 mL, Rfl: 2    Past Medical History:   Diagnosis Date   • Diabetes mellitus ([address] )    • DJD (degenerative joint disease)    • Eczema    • GERD (gastroesophageal reflux disease)    • Gestational diabetes    • Hydronephrosis     last assessed 17 documented resolved 17   • Kidney stones 2020   • Menorrhagia with regular cycle 2018    treated with Lysteda   • Nephrolithiasis    • Obesity    • Seasonal allergies    • Varicella      Past Surgical History:   Procedure Laterality Date   •  SECTION      X 2  and     • CYSTOSCOPY     • DILATION AND CURETTAGE OF UTERUS      x 2 for missed    • AR CYSTO/URETERO W/LITHOTRIPSY &INDWELL STENT INSRT Right 2016    Procedure: CYSTOSCOPY; URETEROSCOPY; HOLMIUM LASER LITHOTRIPSY; BASKET STONE EXTRACTION; RETROGRADE PYELOGRAM; STENT PLACEMENT ;  Surgeon: Amanda Mujica MD;  Location:  MAIN OR;  Service: Urology   • AR CYSTO/URETERO W/LITHOTRIPSY &INDWELL STENT INSRT Left 2018    Procedure: CYSTOSCOPY, LEFT URETEROSCOPY AND RENAL ENDOSCOPY WITH LITHOTRIPSY OF RENAL CALCULUS WITH HOLMIUM LASER,  BILATERAL RETROGRADE PYELOGRAM AND INSERTION OF LEFT URETERAL STENT;  Surgeon: Konrad Peraza MD;  Location: [facility];  Service: Urology   • TONSILLECTOMY     • TUBAL LIGATION      at time of    • WISDOM TOOTH EXTRACTION       Family History   Problem Relation Age of Onset   • Diabetes Mother    • Heart attack Father    • Heart disease Father         myocardial infarction   • Hyperlipidemia Father    • Hypertension Father    • Coronary artery disease Father    • Cancer Father 54        malignant neoplasm of urinary bladder   • Multiple sclerosis Sister    • COPD Maternal Grandmother    • Liver cancer Paternal Grandmother 79   • Pancreatic cancer Paternal Grandmother 79   • Diabetes Paternal Grandmother    • Stomach cancer Paternal Grandfather         unknown age   • No Known Problems Maternal Grandfather    • Endometrial cancer Paternal Aunt 59   • No Known Problems Son    • No Known Problems Son    • No Known Problems Sister    • No Known Problems Maternal Aunt    • No Known Problems Maternal Aunt    • Breast cancer Neg Hx    • Ovarian cancer Neg Hx    • Colon cancer Neg Hx       reports that she has never smoked  She has never used smokeless tobacco  She reports current alcohol use  She reports that she does not use drugs  Physical Exam:   Vitals:    03/22/23 0959   BP: 116/66   BP Location: Left arm   Patient Position: Sitting   Cuff Size: Adult   Pulse: 98   SpO2: 97%   Weight: 91 6 kg (202 lb)   Height: 5' 5" (1 651 m)     Body mass index is 33 61 kg/m²      General: conscious, cooperative, in not acute distress  Eyes: conjunctivae pink, anicteric sclerae  ENT: lips and mucous membranes moist  Neck: supple, no JVD  Chest: clear breath sounds bilateral, no crackles, ronchus or wheezings  CVS: distinct S1 & S2, normal rate, regular rhythm  Abdomen: soft, non-tender, non-distended, normoactive bowel sounds  Extremities: No edema  Skin: no rash  Neuro: awake, alert, oriented      Lab Results:    Results for orders placed or performed in visit on 02/27/23   Stone risk profile   Result Value Ref Range    Total Volume, Ur 1400 600 - 1600 mL/24 hr    Preserved Urine Volume 1400 600 - 1600 mL/24 hr    Ammonia, Urine 91137 Not Estab  ug/dL    Ammonia, 24HR Ur 33 Not Estab  mEq/24 hr    Calcium, Ur 15 3 Not Estab  mg/dL    Calcium 24HR Ur 214 2 0 0 - 320 0 mg/24 hr    Citrate, Ur 324 Not Estab  mg/L    Citrate 24H  320 - 1240 mg/24 hr Chloride, Ur 138 Not Estab  mmol/L    Chloride 24HR Ur 193 38 - 210 mmol/24 hr    Creatinine, Ur 81 1 Not Estab  mg/dL    Creatinine,Ur 24hr 1135 4 800 0 - 1800 0 mg/24 hr    Cystine, Shai   Urine 5 76 Not Estab  mg/L    Cystine, Quant, Ur, 24hr 8 06 2 10 - 58 00 mg/24 hr    Magnesium, Ur 3 2 Not Estab  mg/dL    Magnesium, 24H Ur 45 12 - 293 mg/24 hr    Oxalate, Ur 8 Not Estab  mg/L    Oxalate, 24HR Ur 11 4 - 31 mg/24 hr    Osmolality, Ur 639 300 - 900 mOsmol/kg    pH, 24 Hr Urine 5 9 4 5 - 8 0    Phosphorus, Ur 60 9 Not Estab  mg/dL    Phosphorus, 24HR Ur 852 6 261 0 - 1078 0 mg/24 hr    Potassium, Ur 31 8 Not Estab  mmol/L    Potassium, 24HR Ur 44 5 14 0 - 95 0 mmol/24 hr    Sodium, Ur 149 Not Estab  mmol/L    Sodium, 24HR Ur 209 39 - 258 mmol/24 hr    Sulfate, Ur, mEq/L 21 Not Estab  mEq/L    Sulfate, 24H Ur 29 0 - 30 mEq/24 hr    Uric Acid, Ur 38 4 Not Estab  mg/dL    Uric Acid, 24HR Ur 538 174 - 902 mg/24 hr    Brushite 1 61 0 00 - 3 00 ratio    Calcium Oxalate 2 93 0 00 - 6 00 ratio    Monosodium Urate 5 62 (H) 0 00 - 4 00 ratio    Struvite 0 01 0 00 - 1 00 ratio    Uric Acid 1 89 (H) 0 00 - 1 20 ratio    Please Note Comment    Basic metabolic panel   Result Value Ref Range    Sodium 137 135 - 147 mmol/L    Potassium 3 7 3 5 - 5 3 mmol/L    Chloride 102 96 - 108 mmol/L    CO2 29 21 - 32 mmol/L    ANION GAP 6 4 - 13 mmol/L    BUN 21 5 - 25 mg/dL    Creatinine 0 89 0 60 - 1 30 mg/dL    Glucose 81 65 - 140 mg/dL    Calcium 9 3 8 4 - 10 2 mg/dL    eGFR 78 ml/min/1 73sq m   CBC and differential   Result Value Ref Range    WBC 9 24 4 31 - 10 16 Thousand/uL    RBC 4 69 3 81 - 5 12 Million/uL    Hemoglobin 12 0 11 5 - 15 4 g/dL    Hematocrit 38 6 34 8 - 46 1 %    MCV 82 82 - 98 fL    MCH 25 6 (L) 26 8 - 34 3 pg    MCHC 31 1 (L) 31 4 - 37 4 g/dL    RDW 15 2 (H) 11 6 - 15 1 %    MPV 11 1 8 9 - 12 7 fL    Platelets 151 207 - 400 Thousands/uL    nRBC 0 /100 WBCs    Neutrophils Relative 65 43 - 75 %    Immat GRANS % 0 0 - 2 %    Lymphocytes Relative 24 14 - 44 %    Monocytes Relative 9 4 - 12 %    Eosinophils Relative 1 0 - 6 %    Basophils Relative 1 0 - 1 %    Neutrophils Absolute 6 08 1 85 - 7 62 Thousands/µL    Immature Grans Absolute 0 02 0 00 - 0 20 Thousand/uL    Lymphocytes Absolute 2 18 0 60 - 4 47 Thousands/µL    Monocytes Absolute 0 79 0 17 - 1 22 Thousand/µL    Eosinophils Absolute 0 11 0 00 - 0 61 Thousand/µL    Basophils Absolute 0 06 0 00 - 0 10 Thousands/µL   Urinalysis with microscopic   Result Value Ref Range    Color, UA Yellow     Clarity, UA Clear     Specific Gravity, UA 1 020 1 005 - 1 030    pH, UA 6 0 4 5, 5 0, 5 5, 6 0, 6 5, 7 0, 7 5, 8 0    Leukocytes, UA Negative Negative    Nitrite, UA Negative Negative    Protein, UA Negative Negative mg/dl    Glucose, UA Negative Negative mg/dl    Ketones, UA Negative Negative mg/dl    Urobilinogen, UA <2 0 <2 0 mg/dl mg/dl    Bilirubin, UA Negative Negative    Occult Blood, UA Large (A) Negative    RBC, UA 30-50 (A) None Seen, 2-4 /hpf    WBC, UA 1-2 (A) None Seen, 2-4, 5-60 /hpf    Epithelial Cells Occasional None Seen, Occasional /hpf    Bacteria, UA Occasional None Seen, Occasional /hpf   TSH, 3rd generation with Free T4 reflex   Result Value Ref Range    TSH 3RD GENERATON 1 194 0 450 - 4 500 uIU/mL       Portions of the record may have been created with voice recognition software  Occasional wrong word or "sound a like" substitutions may have occurred due to the inherent limitations of voice recognition software  Read the chart carefully and recognize, using context, where substitutions have occurred  If you have any questions, please contact the dictating provider

## 2023-03-22 NOTE — PROGRESS NOTES
Assessment/Plan:  Colleen Mcdonald was seen today for consult  Diagnoses and all orders for this visit:    Type 2 diabetes mellitus without complication, without long-term current use of insulin ([facility] )  -     Ambulatory referral to Weight Management  -     Semaglutide,0 25 or 0 5MG/DOS, 2 MG/1 5ML SOPN; Inject 0 25mg under skin once weekly for 4 weeks, then 0 5mg once weekly  Lab Results   Component Value Date    HGBA1C 7 6 (A) 11/19/2021   could not tolerate metformin XR 500mg as it caused diarrhea    Nephrolithiasis  Avoid topamax     Abnormal weight gain  BMI 40 0-44 9, adult ([facility] )  -     Ambulatory referral to Weight Management  - Discussed options of HealthyCORE-Intensive Lifestyle Intervention Program, Very Low Calorie Diet-VLCD, Conservative Program, Paul-En-Y Gastric Bypass and Vertical Sleeve Gastrectomy and the role of weight loss medications  - Explained the importance of making lifestyle changes first before starting any anti-obesity medications  Patient should demonstrate lifestyle changes first before anti-obesity medication can be initiated  - Patient is interested in pursuing Healthy[facility]-Intensive Lifestyle Intervention Program  - Initial weight loss goal of 5-10% weight loss for improved health  - Weight loss can improve patient's co-morbid conditions and/or prevent weight-related complications  Will start Ozempic for DM2    Goals:  Do not skip any meals! Food log (ie ) www myfitnesspal com,sparkpeople  com,loseit com,calorieking  com,etc  baritastic (use skinnytaste  com or smartphone adam "[employer]" for recipes)  No sugary beverages  At least 64oz of water daily  Increase physical activity by 10 minutes daily   Gradually increase physical activity to a goal of 5 days per week for 30 minutes of MODERATE intensity PLUS 2 days per week of FULL BODY resistance training (use smartphone apps PrizeBoxâ„¢, Home Workout, etc )  Goal protein 90g per day  Goal carbohydrates 90g per day   2368-3781 calories    Total time spent: 45 minute visit, with >50% face-to-face time spent counseling patient on surgical and nonsurgical interventions for the treatment of excess weight  Discussed the advantages and long-term outcomes with regards to bariatric surgery  Discussed in detail nonsurgical options including intensive lifestyle intervention program, very low-calorie diet program and conservative program   Discussed the role of weight loss medications  Counseled patient on diet behavior and exercise modification for weight loss  Follow up in approximately 2 weeks with Non-Surgical Dietician  Subjective:   Chief Complaint   Patient presents with    Consult     MWM       Patient ID: Madison Nunes  is a 42 y o  female with excess weight/obesity here to pursue weight management  Previous notes and records have been reviewed    Past Medical History:   Diagnosis Date    DJD (degenerative joint disease)     Eczema     GERD (gastroesophageal reflux disease)     History of gestational diabetes     Hydronephrosis     last assessed 17 documented resolved 17    Kidney stones 2020    Menorrhagia with regular cycle 2018    treated with Lysteda    Nephrolithiasis     Obesity     Seasonal allergies     Varicella      Past Surgical History:   Procedure Laterality Date     SECTION      X 2  and      CYSTOSCOPY      DILATION AND CURETTAGE OF UTERUS      x 2 for missed     MI CYSTO/URETERO W/LITHOTRIPSY &INDWELL STENT INSRT Right 2016    Procedure: CYSTOSCOPY; URETEROSCOPY; HOLMIUM LASER LITHOTRIPSY; BASKET STONE EXTRACTION; RETROGRADE PYELOGRAM; STENT PLACEMENT ;  Surgeon: Edmond Pierce MD;  Location: [facility];  Service: Urology    MI CYSTO/URETERO W/LITHOTRIPSY &INDWELL STENT INSRT Left 2018    Procedure: CYSTOSCOPY, LEFT URETEROSCOPY AND RENAL ENDOSCOPY WITH LITHOTRIPSY OF RENAL CALCULUS WITH HOLMIUM LASER,  BILATERAL RETROGRADE PYELOGRAM AND INSERTION OF LEFT URETERAL STENT;  Surgeon: Simone Blanca MD;  [facility]: [facility];  Service: Urology    TONSILLECTOMY  2012    TUBAL LIGATION      at time of     WISDOM TOOTH EXTRACTION         HPI:  Wt Readings from Last 20 Encounters:   22 108 kg (238 lb 8 oz)   22 109 kg (240 lb)   22 109 kg (240 lb 12 8 oz)   21 107 kg (235 lb 3 2 oz)   21 105 kg (231 lb 3 2 oz)   21 105 kg (231 lb 3 2 oz)   21 104 kg (230 lb)   21 104 kg (229 lb)   21 104 kg (230 lb)   20 103 kg (227 lb)   20 103 kg (227 lb 3 2 oz)   19 102 kg (225 lb 3 2 oz)   19 99 8 kg (220 lb)   19 99 8 kg (220 lb 1 6 oz)   01/15/19 103 kg (226 lb 9 6 oz)   18 98 kg (216 lb)   18 94 7 kg (208 lb 12 8 oz)   18 93 kg (205 lb)   18 93 kg (205 lb)   18 95 7 kg (211 lb)       Severity: Severe  Onset: over the years since her 4 pregnancies, has 2 children  Modifiers: Diet and Exercise, behavioral changes  Contributing factors: Poor Food Choices and Insufficient Physical Activity  Associated symptoms: comorbid conditions  Goal weight loss: 5-10% STG     B- pb toast    S-  L- hot pocket   S- granola bar   D- roast beef rice   S-     Hydration: water 64 oz, 1 tea   Alcohol: rare   Smoking: no  Exercise: no; has a treadmill   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; 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  Sleep: 7 hours  STOP bang: 3/8    The following portions of the patient's history were reviewed and updated as appropriate: allergies, current medications, past family history, past medical history, past social history, past surgical history, and problem list     Review of Systems   Constitutional: Negative for appetite change, chills and fever  HENT: Negative for rhinorrhea and sore throat  Respiratory: Negative for cough, chest tightness and shortness of breath  Cardiovascular: Negative for chest pain and leg swelling     Gastrointestinal: Negative for abdominal pain, constipation, diarrhea, nausea and vomiting  Endocrine: Negative for cold intolerance and heat intolerance  Genitourinary: Negative for difficulty urinating  Musculoskeletal: Negative for arthralgias  Skin: Negative for color change  Neurological: Negative for dizziness, numbness and headaches  Psychiatric/Behavioral: Positive for sleep disturbance  The patient is not nervous/anxious  All other systems reviewed and are negative  Objective:  /78 (BP Location: Left arm, Patient Position: Sitting)   Pulse 101   Temp 98 °F (36 7 °C)   Ht 5' 1 75" (1 568 m)   Wt 108 kg (238 lb 8 oz)   SpO2 93%   BMI 43 98 kg/m²   Constitutional: Well-developed, well-nourished and obese Body mass index is 43 98 kg/m²  Laxmi Blend HEENT: No conjunctival pallor or jaundice  Pulmonary: No increased work of breathing or signs of respiratory distress  CV: Normal rate, well-perfused  GI: Obese  Non-distended  MSK: No edema   Neuro: Oriented to person, place and time  Normal Speech  Normal gait  Psych: Normal affect and mood  Labs and Imaging  Recent labs and imaging have been personally reviewed    Lab Results   Component Value Date    WBC 8 62 12/30/2019    HGB 12 3 12/30/2019    HCT 39 7 12/30/2019    MCV 84 12/30/2019     12/30/2019     Lab Results   Component Value Date     (L) 01/23/2014    SODIUM 140 03/01/2021    K 3 8 03/01/2021     03/01/2021    CO2 34 (H) 03/01/2021    ANIONGAP 8 01/23/2014    AGAP 3 (L) 03/01/2021    BUN 15 03/01/2021    CREATININE 0 88 03/01/2021    GLUC 187 (H) 03/01/2021    GLUF 114 (H) 12/30/2019    CALCIUM 9 3 03/01/2021    AST 10 12/11/2020    ALT 19 12/11/2020    ALKPHOS 77 12/11/2020    PROT 6 6 01/23/2014    TP 7 2 12/11/2020    BILITOT 0 21 01/23/2014    TBILI 0 32 12/11/2020    EGFR 81 03/01/2021     Lab Results   Component Value Date    HGBA1C 7 6 (A) 11/19/2021     Lab Results   Component Value Date    [id number] 1 790 12/30/2019     Lab Results Component Value Date    CHOLESTEROL 155 08/03/2021     Lab Results   Component Value Date    HDL 42 08/03/2021     Lab Results   Component Value Date    TRIG 103 08/03/2021     Lab Results   Component Value Date    LDLCALC 92 08/03/2021 ,

## 2023-04-03 ENCOUNTER — HOSPITAL ENCOUNTER (OUTPATIENT)
Dept: RADIOLOGY | Age: 48
Discharge: HOME | End: 2023-04-03

## 2023-04-03 DIAGNOSIS — N20.0 NEPHROLITHIASIS: ICD-10-CM

## 2023-04-03 DIAGNOSIS — N92.1 MENORRHAGIA WITH IRREGULAR CYCLE: ICD-10-CM

## 2023-04-17 ENCOUNTER — RX CHECK (OUTPATIENT)
Dept: URBAN - METROPOLITAN AREA CLINIC 6 | Facility: CLINIC | Age: 48
End: 2023-04-17

## 2023-04-17 DIAGNOSIS — H53.8: ICD-10-CM

## 2023-04-17 PROCEDURE — 92015 DETERMINE REFRACTIVE STATE: CPT

## 2023-04-17 ASSESSMENT — VISUAL ACUITY
OS_CC: 20/40
OD_CC: 20/30+1

## 2023-04-24 ENCOUNTER — TELEPHONE (OUTPATIENT)
Dept: OBGYN CLINIC | Facility: CLINIC | Age: 48
End: 2023-04-24

## 2023-04-24 NOTE — TELEPHONE ENCOUNTER
LM on patient's voice mail regarding our receipt of her pelvic ultrasound results and her need to schedule endometrial biopsy  She was left a message on 4/18/23 as well  Please follow up to make sure patient schedules  Thank you

## 2023-05-31 ENCOUNTER — HOSPITAL ENCOUNTER (OUTPATIENT)
Dept: MAMMOGRAPHY | Facility: IMAGING CENTER | Age: 48
Discharge: HOME | End: 2023-05-31

## 2023-05-31 VITALS — WEIGHT: 197 LBS | BODY MASS INDEX: 36.25 KG/M2 | HEIGHT: 62 IN

## 2023-05-31 DIAGNOSIS — Z12.31 ENCOUNTER FOR SCREENING MAMMOGRAM FOR BREAST CANCER: ICD-10-CM

## 2023-06-26 DIAGNOSIS — E66.9 OBESITY, CLASS II, BMI 35-39.9: ICD-10-CM

## 2023-06-26 DIAGNOSIS — E11.69 DIABETES MELLITUS TYPE 2 IN OBESE (HCC): ICD-10-CM

## 2023-06-26 DIAGNOSIS — E66.9 DIABETES MELLITUS TYPE 2 IN OBESE (HCC): ICD-10-CM

## 2023-07-06 ENCOUNTER — OFFICE VISIT (OUTPATIENT)
Dept: BARIATRICS | Facility: CLINIC | Age: 48
End: 2023-07-06
Payer: COMMERCIAL

## 2023-07-06 VITALS
HEIGHT: 62 IN | RESPIRATION RATE: 16 BRPM | HEART RATE: 92 BPM | DIASTOLIC BLOOD PRESSURE: 68 MMHG | WEIGHT: 199.6 LBS | BODY MASS INDEX: 36.73 KG/M2 | SYSTOLIC BLOOD PRESSURE: 114 MMHG

## 2023-07-06 DIAGNOSIS — Z13.220 SCREENING FOR LIPID DISORDERS: ICD-10-CM

## 2023-07-06 DIAGNOSIS — N20.0 NEPHROLITHIASIS: ICD-10-CM

## 2023-07-06 DIAGNOSIS — E66.9 OBESITY, CLASS II, BMI 35-39.9: Primary | ICD-10-CM

## 2023-07-06 DIAGNOSIS — E11.9 TYPE 2 DIABETES MELLITUS WITHOUT COMPLICATION, WITHOUT LONG-TERM CURRENT USE OF INSULIN (HCC): ICD-10-CM

## 2023-07-06 DIAGNOSIS — E66.9 DIABETES MELLITUS TYPE 2 IN OBESE (HCC): ICD-10-CM

## 2023-07-06 DIAGNOSIS — E11.69 DIABETES MELLITUS TYPE 2 IN OBESE (HCC): ICD-10-CM

## 2023-07-06 DIAGNOSIS — K21.9 GASTROESOPHAGEAL REFLUX DISEASE, UNSPECIFIED WHETHER ESOPHAGITIS PRESENT: ICD-10-CM

## 2023-07-06 PROCEDURE — 99214 OFFICE O/P EST MOD 30 MIN: CPT | Performed by: NURSE PRACTITIONER

## 2023-07-06 NOTE — PROGRESS NOTES
Assessment/Plan:     Obesity, Class II, BMI 35-39.9  - Patient is pursuing Conservative Program. Previously had bundles with the dietician.   - Initial weight loss goal of 5-10% weight loss for improved health. - S/P tubal ligation  - Not a Topamax candidate due to history of kidney stones. - Patient denies personal history of pancreatitis. Patient also denies personal and family history of medullary thyroid cancer and multiple endocrine neoplasia type 2 (MEN 2 tumor). - Ozempic started 2/2022 at weight of 238.5 lbs.  - Ozempic changed to [facility] end of September 2022 at weight of 211.8 lbs to help with weight loss. - Continue Wegovy 2.4 mg weekly - tolerating well and helping with appetite. - Feels that she has the knowledge to get back on track with nutrition, but struggling with motivation and has been feeling more tired since returning from vacation. Discussed referral to sleep medicine, but she would like to hold off. Has been struggling with boredom eating and visit with  might be helpful. - check CMP, A1C, and lipid panel. Initial: 238.5 lbs  Last visit: 200.8 lbs   Current: 199.6 lbs BMI 36.51  Change: - 38.9 lbs (- 1.2 lbs since last office visit)    Goals:  Do not skip meals. Get protein with each meal.  Restart food logging  Keep up the great work with water intake, get at least 64 oz daily. Gradually increase walking to goal of 5 days per week for 30 minutes. Continue nutrition recommendations per the dietician. Calories:1400 calories and flex to 1600 calories on walk day  Continue Wegovy    Type 2 diabetes mellitus without complication, without long-term current use of insulin [facility])  - May improve with weight loss and lifestyle modification. Lab Results   Component Value Date    HGBA1C 5.8 (H) 11/15/2022       GERD (gastroesophageal reflux disease)  - Taking pepcid. May improve with weight loss and lifestyle modification.  Continue management with prescribing provider. Nephrolithiasis  - Taking HCTZ. Continue management with prescribing provider. Trinidad Neville was seen today for follow-up. Diagnoses and all orders for this visit:    Obesity, Class II, BMI 35-39.9  -     Comprehensive metabolic panel; Future  -     HEMOGLOBIN A1C W/ EAG ESTIMATION; Future  -     Lipid Panel with Direct LDL reflex; Future  -     Semaglutide-Weight Management (WEGOVY) 2.4 MG/0.75ML; Inject 0.75 mL (2.4 mg total) under the skin once a week    Diabetes mellitus type 2 in obese (HCC)  -     Comprehensive metabolic panel; Future  -     HEMOGLOBIN A1C W/ EAG ESTIMATION; Future  -     Lipid Panel with Direct LDL reflex; Future  -     Semaglutide-Weight Management (WEGOVY) 2.4 MG/0.75ML; Inject 0.75 mL (2.4 mg total) under the skin once a week    Screening for lipid disorders  -     Lipid Panel with Direct LDL reflex; Future    Type 2 diabetes mellitus without complication, without long-term current use of insulin (HCC)    Gastroesophageal reflux disease, unspecified whether esophagitis present    Nephrolithiasis        Follow up in approximately 4 months with Non-Surgical Physician/Advanced Practitioner. Subjective:   Chief Complaint   Patient presents with   • Follow-up     MWM 3mth f/u; waist 43in       Patient ID: Lidia Jansen  is a 57 y.o. female with excess weight/obesity here to pursue weight management. Patient is pursuing Conservative Program.    Most recent notes and records were reviewed. HPI    Wt Readings from Last 10 Encounters:   07/06/23 90.5 kg (199 lb 9.6 oz)   05/31/23 89.4 kg (197 lb)   03/22/23 91.6 kg (202 lb)   03/15/23 91.1 kg (200 lb 12.8 oz)   01/23/23 92.5 kg (204 lb)   01/12/23 92.7 kg (204 lb 6.4 oz)   10/26/22 96.5 kg (212 lb 11.2 oz)   09/22/22 96 kg (211 lb 11.2 oz)   08/31/22 97.1 kg (214 lb 1.6 oz)   07/14/22 99.8 kg (220 lb 1.6 oz)     Taking Wegovy 2.4 mg weekly. No negative side effects. Helping with appetite.      Went on vacation several weeks ago and has been feeling more tired since returning. Has been doing more boredom eating during the summer. Has not been food logging. 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   Sleep- 7 hours, but fragmented      Colonoscopy: due, she is interested in cologuard and will follow-up with her PCP  Mammogram: ANNETTE, nigel May 2024      The following portions of the patient's history were reviewed and updated as appropriate: allergies, current medications, past family history, past medical history, past social history, past surgical history, and problem list.    Family History   Problem Relation Age of Onset   • Diabetes Mother    • Heart attack Father    • Heart disease Father         myocardial infarction   • Hyperlipidemia Father    • Hypertension Father    • Coronary artery disease Father    • Cancer Father 54        malignant neoplasm of urinary bladder   • Multiple sclerosis Sister    • No Known Problems Sister    • COPD Maternal Grandmother    • No Known Problems Maternal Grandfather    • Liver cancer Paternal Grandmother 79   • Pancreatic cancer Paternal Grandmother 79   • Diabetes Paternal Grandmother    • Stomach cancer Paternal Grandfather         unknown age   • No Known Problems Son    • No Known Problems Son    • Pancreatic cancer Maternal Aunt 68   • No Known Problems Maternal Aunt    • Endometrial cancer Paternal Aunt 59   • Breast cancer Neg Hx    • Ovarian cancer Neg Hx    • Colon cancer Neg Hx         Review of Systems   HENT: Negative for sore throat. Respiratory: Negative for cough and shortness of breath. Cardiovascular: Negative for chest pain and palpitations. Gastrointestinal: Negative for abdominal pain, constipation, diarrhea, nausea and vomiting.        + GERD mostly controlled with medication     Musculoskeletal: Positive for arthralgias (intermittent). Negative for back pain. Skin: Negative for rash. Psychiatric/Behavioral: Negative for suicidal ideas (or HI). Denies depression and anxiety       Objective:  /68   Pulse 92   Resp 16   Ht 5' 2" (1.575 m)   Wt 90.5 kg (199 lb 9.6 oz)   BMI 36.51 kg/m²     Physical Exam  Vitals and nursing note reviewed. Constitutional   General appearance: Abnormal.  well developed and obese. Eyes No conjunctival injection. Ears, Nose, Mouth, and Throat Oral mucosa moist.   Pulmonary   Respiratory effort: No increased work of breathing or signs of respiratory distress. Cardiovascular     Examination of extremities for edema and/or varicosities: Normal.  no edema. Abdomen   Abdomen: Abnormal.  The abdomen was obese.     Musculoskeletal   Normal range of motion  Neurological   Gait and station: Normal.    Psychiatric   Orientation to person, place and time: Normal.    Affect: appropriate

## 2023-07-06 NOTE — ASSESSMENT & PLAN NOTE
- May improve with weight loss and lifestyle modification.       Lab Results   Component Value Date    HGBA1C 5.8 (H) 11/15/2022

## 2023-07-06 NOTE — ASSESSMENT & PLAN NOTE
- Taking pepcid. May improve with weight loss and lifestyle modification. Continue management with prescribing provider.

## 2023-07-06 NOTE — ASSESSMENT & PLAN NOTE
- Patient is pursuing Conservative Program. Previously had bundles with the dietician.   - Initial weight loss goal of 5-10% weight loss for improved health. - S/P tubal ligation  - Not a Topamax candidate due to history of kidney stones. - Patient denies personal history of pancreatitis. Patient also denies personal and family history of medullary thyroid cancer and multiple endocrine neoplasia type 2 (MEN 2 tumor). - Ozempic started 2/2022 at weight of 238.5 lbs.  - Ozempic changed to [facility] end of September 2022 at weight of 211.8 lbs to help with weight loss. - Continue Wegovy 2.4 mg weekly - tolerating well and helping with appetite. - Feels that she has the knowledge to get back on track with nutrition, but struggling with motivation and has been feeling more tired since returning from vacation. Discussed referral to sleep medicine, but she would like to hold off. Has been struggling with boredom eating and visit with  might be helpful. - check CMP, A1C, and lipid panel. Initial: 238.5 lbs  Last visit: 200.8 lbs   Current: 199.6 lbs BMI 36.51  Change: - 38.9 lbs (- 1.2 lbs since last office visit)    Goals:  Do not skip meals. Get protein with each meal.  Restart food logging  Keep up the great work with water intake, get at least 64 oz daily. Gradually increase walking to goal of 5 days per week for 30 minutes. Continue nutrition recommendations per the dietician.    Calories:1400 calories and flex to 1600 calories on walk day  Continue [facility]

## 2023-08-03 ENCOUNTER — APPOINTMENT (OUTPATIENT)
Dept: LAB | Facility: CLINIC | Age: 48
End: 2023-08-03
Payer: COMMERCIAL

## 2023-08-03 DIAGNOSIS — E11.69 DIABETES MELLITUS TYPE 2 IN OBESE (HCC): ICD-10-CM

## 2023-08-03 DIAGNOSIS — E66.9 DIABETES MELLITUS TYPE 2 IN OBESE (HCC): ICD-10-CM

## 2023-08-03 DIAGNOSIS — E66.9 OBESITY, CLASS II, BMI 35-39.9: ICD-10-CM

## 2023-08-03 DIAGNOSIS — Z13.220 SCREENING FOR LIPID DISORDERS: ICD-10-CM

## 2023-08-03 LAB
ALBUMIN SERPL BCP-MCNC: 3.6 G/DL (ref 3.5–5)
ALP SERPL-CCNC: 59 U/L (ref 46–116)
ALT SERPL W P-5'-P-CCNC: 17 U/L (ref 12–78)
ANION GAP SERPL CALCULATED.3IONS-SCNC: 10 MMOL/L
AST SERPL W P-5'-P-CCNC: 15 U/L (ref 5–45)
BILIRUB SERPL-MCNC: 0.45 MG/DL (ref 0.2–1)
BUN SERPL-MCNC: 18 MG/DL (ref 5–25)
CALCIUM SERPL-MCNC: 9.1 MG/DL (ref 8.3–10.1)
CHLORIDE SERPL-SCNC: 105 MMOL/L (ref 96–108)
CHOLEST SERPL-MCNC: 154 MG/DL
CO2 SERPL-SCNC: 23 MMOL/L (ref 21–32)
CREAT SERPL-MCNC: 0.91 MG/DL (ref 0.6–1.3)
EST. AVERAGE GLUCOSE BLD GHB EST-MCNC: 131 MG/DL
GFR SERPL CREATININE-BSD FRML MDRD: 75 ML/MIN/1.73SQ M
GLUCOSE P FAST SERPL-MCNC: 85 MG/DL (ref 65–99)
HBA1C MFR BLD: 6.2 %
HDLC SERPL-MCNC: 43 MG/DL
LDLC SERPL CALC-MCNC: 97 MG/DL (ref 0–100)
POTASSIUM SERPL-SCNC: 4.1 MMOL/L (ref 3.5–5.3)
PROT SERPL-MCNC: 6.9 G/DL (ref 6.4–8.4)
SODIUM SERPL-SCNC: 138 MMOL/L (ref 135–147)
TRIGL SERPL-MCNC: 72 MG/DL

## 2023-08-03 PROCEDURE — 83036 HEMOGLOBIN GLYCOSYLATED A1C: CPT

## 2023-08-03 PROCEDURE — 80053 COMPREHEN METABOLIC PANEL: CPT

## 2023-08-03 PROCEDURE — 80061 LIPID PANEL: CPT

## 2023-08-03 PROCEDURE — 36415 COLL VENOUS BLD VENIPUNCTURE: CPT

## 2023-08-04 ENCOUNTER — TELEPHONE (OUTPATIENT)
Dept: BARIATRICS | Facility: CLINIC | Age: 48
End: 2023-08-04

## 2023-08-04 NOTE — TELEPHONE ENCOUNTER
----- Message from [facility] sent at 8/3/2023  5:46 PM EDT -----  Please let the patient know I have reviewed her CMP, lipid panel, and A1c. HDL (good cholesterol) was slightly low and will likely improve with weight loss and cardiovascular exercise. A1c increased a bit to 6.2. Previous A1c in November was 5.8. This will likely improve further with weight loss. Remainder of the blood work was within acceptable range.

## 2023-11-08 ENCOUNTER — OFFICE VISIT (OUTPATIENT)
Dept: BARIATRICS | Facility: CLINIC | Age: 48
End: 2023-11-08
Payer: COMMERCIAL

## 2023-11-08 VITALS
WEIGHT: 199.4 LBS | BODY MASS INDEX: 36.7 KG/M2 | SYSTOLIC BLOOD PRESSURE: 100 MMHG | HEIGHT: 62 IN | HEART RATE: 100 BPM | DIASTOLIC BLOOD PRESSURE: 66 MMHG | RESPIRATION RATE: 16 BRPM

## 2023-11-08 DIAGNOSIS — E66.9 DIABETES MELLITUS TYPE 2 IN OBESE: ICD-10-CM

## 2023-11-08 DIAGNOSIS — E66.9 OBESITY, CLASS II, BMI 35-39.9: Primary | ICD-10-CM

## 2023-11-08 DIAGNOSIS — K21.9 GASTROESOPHAGEAL REFLUX DISEASE, UNSPECIFIED WHETHER ESOPHAGITIS PRESENT: ICD-10-CM

## 2023-11-08 DIAGNOSIS — E11.69 DIABETES MELLITUS TYPE 2 IN OBESE: ICD-10-CM

## 2023-11-08 PROCEDURE — 99214 OFFICE O/P EST MOD 30 MIN: CPT | Performed by: NURSE PRACTITIONER

## 2023-11-08 NOTE — ASSESSMENT & PLAN NOTE
- Start Mounjaro as A1C increased from 5.8 to 6.2.    Lab Results   Component Value Date    HGBA1C 6.2 (H) 08/03/2023

## 2023-11-08 NOTE — PATIENT INSTRUCTIONS
- Side effects of Mounjaro discussed: nausea, vomiting, diarrhea, and constipation. If severe abdominal pain develops, stop Mounjaro and go to the ER, as this could be pancreatitis. Monitor heart rate while on Mounjaro and if resting heart rate greater than 100 beats per minute, patient will notify me. - If you need to have surgery or another procedure, such as an upper endoscopy or colonoscopy, please contact my office as often medications like Mounjaro need to be held for a certain amount of time prior to a procedure. After you take the second pen of Mounjaro message me with an update so we can plan ahead for the next dose.

## 2023-11-08 NOTE — ASSESSMENT & PLAN NOTE
- Patient is pursuing Conservative Program. Previously had bundles with the dietician.   - Initial weight loss goal of 5-10% weight loss for improved health. - S/P tubal ligation  - Not a Topamax candidate due to history of kidney stones. - Patient denies personal history of pancreatitis. Patient also denies personal and family history of medullary thyroid cancer and multiple endocrine neoplasia type 2 (MEN 2 tumor). - Metformin in the past caused diarrhea.   - Ozempic started 2/2022 at weight of 238.5 lbs.  - Ozempic changed to [facility] end of September 2022 at weight of 211.8 lbs to help with weight loss. - Taking Wegovy 2.4 mg weekly, tolerating well and helping with appetite, but has been having cravings for sweets and weight loss has hit a plateau. A1C recently checked and increased from 5.8 to 6.2. Given history of diabetes type 2, discussed changing from [facility] to [facility] and she was agreeable. - Start Mounjaro 2.5 mg weekly. - Side effects of Mounjaro discussed: nausea, vomiting, diarrhea, and constipation. If severe abdominal pain develops, stop Mounjaro and go to the ER, as this could be pancreatitis. Monitor heart rate while on Mounjaro and if resting heart rate greater than 100 beats per minute, patient will notify me. - If you need to have surgery or another procedure, such as an upper endoscopy or colonoscopy, please contact my office as often medications like Mounjaro need to be held for a certain amount of time prior to a procedure. - Labs reviewed: CMP, A1C, and lipid panel 8/3/2023. A1C 6.2 (increased from previous A1C of 5.8) and HDL low, which will all likely improve with weight loss. Initial: 238.5 lbs  Last visit: 199.6 lbs BMI 36.51  Current: 199.4 lbs BMI 36.47  Change: - 40 lbs (- 1 lbs since last office visit)    Goals:  Get protein with each meal.  Restart food logging.    6000-1108 calories per day  Keep up the great work with water intake, get at least 64 oz daily.  Start exercise, such as walking 2 days per week for 10 minutes and gradually increase to goal of 5 days per week for 30 minutes.    Stop [place]  Start Cappuccini

## 2023-11-08 NOTE — PROGRESS NOTES
Assessment/Plan:     Obesity, Class II, BMI 35-39.9  - Patient is pursuing Conservative Program. Previously had bundles with the dietician.   - Initial weight loss goal of 5-10% weight loss for improved health. - S/P tubal ligation  - Not a Topamax candidate due to history of kidney stones. - Patient denies personal history of pancreatitis. Patient also denies personal and family history of medullary thyroid cancer and multiple endocrine neoplasia type 2 (MEN 2 tumor). - Metformin in the past caused diarrhea.   - Ozempic started 2/2022 at weight of 238.5 lbs.  - Ozempic changed to [facility] end of September 2022 at weight of 211.8 lbs to help with weight loss. - Taking Wegovy 2.4 mg weekly, tolerating well and helping with appetite, but has been having cravings for sweets and weight loss has hit a plateau. A1C recently checked and increased from 5.8 to 6.2. Given history of diabetes type 2, discussed changing from [facility] to [facility] and she was agreeable. - Start Mounjaro 2.5 mg weekly. - Side effects of Mounjaro discussed: nausea, vomiting, diarrhea, and constipation. If severe abdominal pain develops, stop Mounjaro and go to the ER, as this could be pancreatitis. Monitor heart rate while on Mounjaro and if resting heart rate greater than 100 beats per minute, patient will notify me. - If you need to have surgery or another procedure, such as an upper endoscopy or colonoscopy, please contact my office as often medications like Mounjaro need to be held for a certain amount of time prior to a procedure. - Labs reviewed: CMP, A1C, and lipid panel 8/3/2023. A1C 6.2 (increased from previous A1C of 5.8) and HDL low, which will all likely improve with weight loss. Initial: 238.5 lbs  Last visit: 199.6 lbs BMI 36.51  Current: 199.4 lbs BMI 36.47  Change: - 40 lbs (- 1 lbs since last office visit)    Goals:  Get protein with each meal.  Restart food logging.    5459-7522 calories per day  Keep up the great work with water intake, get at least 64 oz daily. Start exercise, such as walking 2 days per week for 10 minutes and gradually increase to goal of 5 days per week for 30 minutes. Stop [facility]  Start Mounjaro    Diabetes mellitus type 2 in obese   - Start Mounjaro as A1C increased from 5.8 to 6.2. Lab Results   Component Value Date    HGBA1C 6.2 (H) 08/03/2023       GERD (gastroesophageal reflux disease)  - Taking pepcid. May improve with weight loss and lifestyle modification. Continue management with prescribing provider. Bebo Madden was seen today for follow-up. Diagnoses and all orders for this visit:    Obesity, Class II, BMI 35-39.9    Diabetes mellitus type 2 in obese   -     tirzepatide (Mounjaro) 2.5 MG/0.5ML; Inject 0.5 mL (2.5 mg total) under the skin every 7 days    Gastroesophageal reflux disease, unspecified whether esophagitis present          Follow up in approximately  4 months  with Non-Surgical Physician/Advanced Practitioner. Subjective:   Chief Complaint   Patient presents with    Follow-up     MWM- 4mth f/u, Waist 44in       Patient ID: Dante Rubio  is a 57 y.o. female with excess weight/obesity here to pursue weight management. Patient is pursuing Conservative Program.    Most recent notes and records were reviewed. HPI    Wt Readings from Last 10 Encounters:   11/08/23 90.4 kg (199 lb 6.4 oz)   07/06/23 90.5 kg (199 lb 9.6 oz)   05/31/23 89.4 kg (197 lb)   03/22/23 91.6 kg (202 lb)   03/15/23 91.1 kg (200 lb 12.8 oz)   01/23/23 92.5 kg (204 lb)   01/12/23 92.7 kg (204 lb 6.4 oz)   10/26/22 96.5 kg (212 lb 11.2 oz)   09/22/22 96 kg (211 lb 11.2 oz)   08/31/22 97.1 kg (214 lb 1.6 oz)     Taking Wegovy 2.4 mg weekly. No negative side effects. Helping with appetite, but having cravings for sweets. Weight loss has hit a plateau. Due for injection today, but ran out of medication. Needs new prior auth. Has not been food logging. Not skipping meals. 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    Family History   Problem Relation Age of Onset    Diabetes Mother     Heart attack Father     Heart disease Father         myocardial infarction    Hyperlipidemia Father     Hypertension Father     Coronary artery disease Father     Cancer Father 54        malignant neoplasm of urinary bladder    Multiple sclerosis Sister     No Known Problems Sister     COPD Maternal Grandmother     No Known Problems Maternal Grandfather     Liver cancer Paternal Grandmother 79    Pancreatic cancer Paternal Grandmother 79    Diabetes Paternal Grandmother     Stomach cancer Paternal Grandfather         unknown age    No Known Problems Son     No Known Problems Son     Pancreatic cancer Maternal Aunt 68    No Known Problems Maternal Aunt     Endometrial cancer Paternal Aunt 59    Breast cancer Neg Hx     Ovarian cancer Neg Hx     Colon cancer Neg Hx         Review of Systems   HENT:  Negative for sore throat. Respiratory:  Negative for cough and shortness of breath. Cardiovascular:  Negative for chest pain and palpitations. Gastrointestinal:  Negative for abdominal pain, constipation, diarrhea, nausea and vomiting.        + GERD controlled with medication     Musculoskeletal:  Positive for arthralgias (intermittent). Negative for back pain. Skin:  Negative for rash. Psychiatric/Behavioral:  Negative for suicidal ideas (or HI).          Denies depression and anxiety       Objective:  /66   Pulse 100   Resp 16 Ht 5' 2" (1.575 m)   Wt 90.4 kg (199 lb 6.4 oz)   BMI 36.47 kg/m²     Physical Exam  Vitals and nursing note reviewed. Constitutional   General appearance: Abnormal.  well developed and obese. Eyes No conjunctival injection. Ears, Nose, Mouth, and Throat Oral mucosa moist.   Pulmonary   Respiratory effort: No increased work of breathing or signs of respiratory distress. Cardiovascular     Examination of extremities for edema and/or varicosities: Normal.  no edema. Abdomen   Abdomen: Abnormal.  The abdomen was obese.     Musculoskeletal   Normal range of motion  Neurological   Gait and station: Normal.    Psychiatric   Orientation to person, place and time: Normal.    Affect: appropriate

## 2023-11-09 ENCOUNTER — TELEPHONE (OUTPATIENT)
Dept: BARIATRICS | Facility: CLINIC | Age: 48
End: 2023-11-09

## 2023-11-20 ENCOUNTER — PATIENT MESSAGE (OUTPATIENT)
Dept: BARIATRICS | Facility: CLINIC | Age: 48
End: 2023-11-20

## 2023-11-20 DIAGNOSIS — E11.69 DIABETES MELLITUS TYPE 2 IN OBESE: Primary | ICD-10-CM

## 2023-11-20 DIAGNOSIS — E66.9 DIABETES MELLITUS TYPE 2 IN OBESE: Primary | ICD-10-CM

## 2023-12-31 ENCOUNTER — PATIENT MESSAGE (OUTPATIENT)
Dept: BARIATRICS | Facility: CLINIC | Age: 48
End: 2023-12-31

## 2023-12-31 DIAGNOSIS — E66.9 DIABETES MELLITUS TYPE 2 IN OBESE: Primary | ICD-10-CM

## 2023-12-31 DIAGNOSIS — E11.69 DIABETES MELLITUS TYPE 2 IN OBESE: Primary | ICD-10-CM

## 2024-01-21 ENCOUNTER — PATIENT MESSAGE (OUTPATIENT)
Dept: BARIATRICS | Facility: CLINIC | Age: 49
End: 2024-01-21

## 2024-01-21 DIAGNOSIS — E66.9 DIABETES MELLITUS TYPE 2 IN OBESE: Primary | ICD-10-CM

## 2024-01-21 DIAGNOSIS — E11.69 DIABETES MELLITUS TYPE 2 IN OBESE: Primary | ICD-10-CM

## 2024-01-26 ENCOUNTER — ANNUAL EXAM (OUTPATIENT)
Dept: OBGYN CLINIC | Facility: CLINIC | Age: 49
End: 2024-01-26
Payer: COMMERCIAL

## 2024-01-26 VITALS
SYSTOLIC BLOOD PRESSURE: 96 MMHG | HEIGHT: 62 IN | BODY MASS INDEX: 37.58 KG/M2 | WEIGHT: 204.2 LBS | DIASTOLIC BLOOD PRESSURE: 62 MMHG

## 2024-01-26 DIAGNOSIS — Z01.411 ENCOUNTER FOR GYNECOLOGICAL EXAMINATION WITH ABNORMAL FINDING: Primary | ICD-10-CM

## 2024-01-26 DIAGNOSIS — Z11.51 SCREENING FOR HPV (HUMAN PAPILLOMAVIRUS): ICD-10-CM

## 2024-01-26 DIAGNOSIS — Z12.31 ENCOUNTER FOR SCREENING MAMMOGRAM FOR BREAST CANCER: ICD-10-CM

## 2024-01-26 DIAGNOSIS — Z12.4 ENCOUNTER FOR PAPANICOLAOU SMEAR FOR CERVICAL CANCER SCREENING: ICD-10-CM

## 2024-01-26 PROCEDURE — S0612 ANNUAL GYNECOLOGICAL EXAMINA: HCPCS | Performed by: NURSE PRACTITIONER

## 2024-01-26 PROCEDURE — G0476 HPV COMBO ASSAY CA SCREEN: HCPCS | Performed by: NURSE PRACTITIONER

## 2024-01-26 PROCEDURE — G0145 SCR C/V CYTO,THINLAYER,RESCR: HCPCS | Performed by: NURSE PRACTITIONER

## 2024-01-26 NOTE — PATIENT INSTRUCTIONS
Weight Management   WHAT YOU NEED TO KNOW:   Being overweight increases your risk of health conditions such as heart disease, high blood pressure, type 2 diabetes, and certain types of cancer. It can also increase your risk for osteoarthritis, sleep apnea, and other respiratory problems. Aim for a slow, steady weight loss. Even a small amount of weight loss can lower your risk of health problems.  DISCHARGE INSTRUCTIONS:   How to lose weight safely:  A safe and healthy way to lose weight is to eat fewer calories and get regular exercise.  You can lose up about 1 pound a week by decreasing the number of calories you eat by 500 calories each day. You can decrease calories by eating smaller portion sizes or by cutting out high-calorie foods. Read labels to find out how many calories are in the foods you eat.         You can also burn calories with exercise such as walking, swimming, or biking. You will be more likely to keep weight off if you make these changes part of your lifestyle. Exercise at least 30 minutes per day on most days of the week. You can also fit in more physical activity by taking the stairs instead of the elevator or parking farther away from stores. Ask your healthcare provider about the best exercise plan for you.       Healthy meal plan for weight management:  A healthy meal plan includes a variety of foods, contains fewer calories, and helps you stay healthy. A healthy meal plan includes the following:     Eat whole-grain foods more often.  A healthy meal plan should contain fiber. Fiber is the part of grains, fruits, and vegetables that is not broken down by your body. Whole-grain foods are healthy and provide extra fiber in your diet. Some examples of whole-grain foods are whole-wheat breads and pastas, oatmeal, brown rice, and bulgur.    Eat a variety of vegetables every day.  Include dark, leafy greens such as spinach, kale, sakina greens, and mustard greens. Eat yellow and orange vegetables  such as carrots, sweet potatoes, and winter squash.     Eat a variety of fruits every day.  Choose fresh or canned fruit (canned in its own juice or light syrup) instead of juice. Fruit juice has very little or no fiber.    Eat low-fat dairy foods.  Drink fat-free (skim) milk or 1% milk. Eat fat-free yogurt and low-fat cottage cheese. Try low-fat cheeses such as mozzarella and other reduced-fat cheeses.    Choose meat and other protein foods that are low in fat.  Choose beans or other legumes such as split peas or lentils. Choose fish, skinless poultry (chicken or turkey), or lean cuts of red meat (beef or pork). Before you cook meat or poultry, cut off any visible fat.     Use less fat and oil.  Try baking foods instead of frying them. Add less fat, such as margarine, sour cream, regular salad dressing, and mayonnaise to foods. Eat fewer high-fat foods. Some examples of high-fat foods include french fries, doughnuts, ice cream, and cakes.    Eat fewer sweets.  Limit foods and drinks that are high in sugar. This includes candy, cookies, regular soda, and sweetened drinks.  Ways to decrease calories:   Eat smaller portions.     Use a small plate with smaller servings.    Do not eat second helpings.    When you eat at a restaurant, ask for a box and place half of your meal in the box before you eat.    Share an entrée with someone else.    Replace high-calorie snacks with healthy, low-calorie snacks.     Choose fresh fruit, vegetables, fat-free rice cakes, or air-popped popcorn instead of potato chips, nuts, or chocolate.    Choose water or calorie-free drinks instead of soda or sweetened drinks.    Do not shop for groceries when you are hungry.  You may be more likely to make unhealthy food choices. Take a grocery list of healthy foods and shop after you have eaten.    Eat regular meals. Do not skip meals. Skipping meals can lead to overeating later in the day. This can make it harder for you to lose weight. Eat a  healthy snack in place of a meal if you do not have time to eat a regular meal. Talk with a dietitian to help you create a meal plan and schedule that is right for you.    Other things to consider as you try to lose weight:   Be aware of situations that may give you the urge to overeat, such as eating while watching television. Find ways to avoid these situations. For example, read a book, go for a walk, or do crafts.    Meet with a weight loss support group or friends who are also trying to lose weight. This may help you stay motivated to continue working on your weight loss goals.    © Copyright [employer] 2023 Information is for End User's use only and may not be sold, redistributed or otherwise used for commercial purposes.  The above information is an  only. It is not intended as medical advice for individual conditions or treatments. Talk to your doctor, nurse or pharmacist before following any medical regimen to see if it is safe and effective for you.  Calcium and Osteoporosis   AMBULATORY CARE:   Why calcium is important for osteoporosis:  Calcium is important for osteoporosis because calcium helps build bone mass. Osteoporosis is a long-term medical condition that causes your body to break down more bone than it makes. Your bones become weak, brittle, and more likely to fracture.   Your calcium needs:   Women:      19 to 50 years: 1,000 mg    Over 50: 1,200 mg    Pregnant or breastfeeding, 19 years to 50 years: 1,000 mg    Men:      19 to 70: 1,000 mg    Over 70: 1,200 mg    Foods that are high in calcium:  The following list shows the number of calcium milligrams (mg) per serving. Your dietitian or healthcare provider can help you create a balanced meal plan for your calcium needs.  Dairy:      1 cup of low-fat plain yogurt (415 mg) or low-fat fruit yogurt (245 to 384 mg)    1½ ounces of shredded cheddar cheese (306 mg) or part skim mozzarella cheese (275 mg)    1 cup of skim, 2%, or whole  milk (300 mg)    1 cup of cottage cheese made with 2% milk fat (138 mg)    ½ cup of frozen yogurt (103 mg)    Other foods:      1 cup of calcium-fortified orange juice (300 mg)    ½ cup of cooked sakina greens (220 mg)    4 canned sardines, with bones (242 mg)    ½ cup of tofu (with added calcium) (204 mg)       How to get extra calcium:   Add powdered milk to puddings, cocoa, custard, or hot cereal.    Sift powdered milk into flour when you make cakes, cookies, or breads.    Use low-fat or fat-free milk instead of water in pancake mix, mashed potatoes, pudding, or hot breakfast cereal.    Add low-fat or fat-free cheese to salad, soup, or pasta.    Add tofu (with added calcium) to vegetable stir-rock.    Take calcium supplements if you cannot get enough calcium from the foods you eat. Your body can absorb the most calcium from supplements when you take 500 mg or less at one time. Do not take more than 2,500 mg of calcium supplements each day.    Follow up with your doctor or dietitian as directed:  Write down your questions so you remember to ask them during your visits.  © Copyright [employer] 2023 Information is for End User's use only and may not be sold, redistributed or otherwise used for commercial purposes.  The above information is an  only. It is not intended as medical advice for individual conditions or treatments. Talk to your doctor, nurse or pharmacist before following any medical regimen to see if it is safe and effective for you.    EXERCISE RECOMMENDATIONS:  Recommend regular exercise, 30 minutes of cardiovascular, 4-5 times a week (brisk walking, jogging, biking, elliptical).

## 2024-01-26 NOTE — PROGRESS NOTES
Assessment / Plan    1. Encounter for gynecological examination with abnormal finding  Normal well woman exam  Pap with hpv updated    2. Encounter for Papanicolaou smear for cervical cancer screening    - Liquid-based pap, screening    3. Screening for HPV (human papillomavirus)    - Liquid-based pap, screening    4. Encounter for screening mammogram for breast cancer  Order provided for this year    - Mammo screening bilateral w 3d & cad; Future      Subjective      Sherice Rubio is a 48 y.o. female who presents for her annual gynecologic exam.    49 yo  premenopausal female.    Last year was evaluated for menorrhagia with irregular cycles (Q2w- skipping cycles; flow was gushing with large clots).  I ordered labs, pelvic US and endometrial sampling.  2023 TSH & H/H normal  pelvic US=ut 12 x 4.6 x 6.5 cm, hetero echotexture, no UFs; eml 6 mm w/ small fluid vs cystic change.  Ovaries NL size and shape w/o cysts/masses.  EBx= patient did not complete despite multiple attempts to schedule.  Patient states that she did not want to do the endometrial biopsy.  She reports that her periods have been monthly, although they are still heavy.  Explained that she has risk factors for endometrial cancer and that endometrial biopsy is the only way to confirm that the tissue is normal.  She expresses understanding and chooses to forgo further evaluation.      2021 pap/hpv negative  Pap hx NL  STD hx none  Sexually active, spouse  2023 Mammo negative    BC method: tubal sterilization  Calcium intake: taking supplement  Exercise: irregular; given guidelines  Safety: feels safe    Periods are regular   Current contraception: tubal ligation  History of abnormal Pap smear: no  Family history of breast,uterine, ovarian or colon cancer: yes - pat aunt endometrial ca      Menstrual History:  OB History          4    Para   2    Term   2            AB   2    Living   2         SAB   2    IAB        Ectopic  "       Multiple        Live Births   2           Obstetric Comments   Menarche: 14    Menses: monthly, Q28-30d/ x 7d /maxipad every 2 hours for 1 days, and then regular tampon every 2 hours x 2 days and then maxipad once daily thereafter     x 2                Patient's last menstrual period was 2024 (approximate).       The following portions of the patient's history were reviewed and updated as appropriate: allergies, current medications, past family history, past medical history, past social history, past surgical history, and problem list.    Review of Systems      Review of Systems   Constitutional:  Negative for chills and fever.   Respiratory:  Negative for cough and shortness of breath.    Gastrointestinal:  Negative for abdominal distention, abdominal pain, blood in stool, constipation, diarrhea, nausea and vomiting.   Genitourinary:  Negative for difficulty urinating, dysuria, frequency, genital sores, hematuria, menstrual problem, pelvic pain, urgency, vaginal bleeding and vaginal discharge.   Musculoskeletal:  Negative for arthralgias and myalgias.     Breasts:  Negative for skin changes, dimpling, asymmetry, nipple discharge, redness, tenderness or palpable masses    Objective      BP 96/62 (BP Location: Left arm, Patient Position: Sitting, Cuff Size: Standard)   Ht 5' 2\" (1.575 m)   Wt 92.6 kg (204 lb 3.2 oz)   LMP 2024 (Approximate)   BMI 37.35 kg/m²   Physical Exam  Constitutional:       General: She is not in acute distress.     Appearance: Normal appearance. She is well-developed. She is not ill-appearing or diaphoretic.      Comments: Bmi 37.4   HENT:      Head: Normocephalic and atraumatic.   Eyes:      Pupils: Pupils are equal, round, and reactive to light.   Neck:      Thyroid: No thyromegaly.   Pulmonary:      Effort: Pulmonary effort is normal.   Chest:   Breasts:     Breasts are symmetrical.      Right: No inverted nipple, mass, nipple discharge, skin change or " tenderness.      Left: No inverted nipple, mass, nipple discharge, skin change or tenderness.   Abdominal:      General: There is no distension.      Palpations: Abdomen is soft. There is no mass.      Tenderness: There is no abdominal tenderness. There is no guarding or rebound.   Genitourinary:     General: Normal vulva.      Exam position: Lithotomy position.      Labia:         Right: No rash, tenderness, lesion or injury.         Left: No rash, tenderness, lesion or injury.       Vagina: No signs of injury and foreign body. Bleeding (scant, menstrual) present. No vaginal discharge, erythema or tenderness.      Cervix: No cervical motion tenderness, discharge or friability.      Uterus: Not enlarged and not tender.       Adnexa:         Right: No mass or tenderness.          Left: No mass or tenderness.     Musculoskeletal:      Cervical back: Neck supple.   Lymphadenopathy:      Cervical: No cervical adenopathy.      Upper Body:      Right upper body: No supraclavicular adenopathy.      Left upper body: No supraclavicular adenopathy.   Skin:     General: Skin is warm and dry.   Neurological:      General: No focal deficit present.      Mental Status: She is alert and oriented to person, place, and time.   Psychiatric:         Mood and Affect: Mood normal.         Behavior: Behavior normal.         Thought Content: Thought content normal.         Judgment: Judgment normal.

## 2024-01-31 LAB
LAB AP GYN PRIMARY INTERPRETATION: NORMAL
Lab: NORMAL

## 2024-02-02 ENCOUNTER — TELEPHONE (OUTPATIENT)
Dept: NEPHROLOGY | Facility: CLINIC | Age: 49
End: 2024-02-02

## 2024-02-08 ENCOUNTER — TELEPHONE (OUTPATIENT)
Dept: BARIATRICS | Facility: CLINIC | Age: 49
End: 2024-02-08

## 2024-02-21 PROBLEM — Z01.419 ENCOUNTER FOR ANNUAL ROUTINE GYNECOLOGICAL EXAMINATION: Status: RESOLVED | Noted: 2019-12-11 | Resolved: 2024-02-21

## 2024-03-02 ENCOUNTER — APPOINTMENT (OUTPATIENT)
Dept: LAB | Facility: CLINIC | Age: 49
End: 2024-03-02
Payer: COMMERCIAL

## 2024-03-02 DIAGNOSIS — N20.0 NEPHROLITHIASIS: ICD-10-CM

## 2024-03-02 LAB
ALBUMIN SERPL BCP-MCNC: 4 G/DL (ref 3.5–5)
ALP SERPL-CCNC: 45 U/L (ref 34–104)
ALT SERPL W P-5'-P-CCNC: 13 U/L (ref 7–52)
ANION GAP SERPL CALCULATED.3IONS-SCNC: 9 MMOL/L
AST SERPL W P-5'-P-CCNC: 15 U/L (ref 13–39)
BILIRUB SERPL-MCNC: 0.39 MG/DL (ref 0.2–1)
BUN SERPL-MCNC: 21 MG/DL (ref 5–25)
CALCIUM SERPL-MCNC: 9.1 MG/DL (ref 8.4–10.2)
CHLORIDE SERPL-SCNC: 101 MMOL/L (ref 96–108)
CO2 SERPL-SCNC: 27 MMOL/L (ref 21–32)
CREAT SERPL-MCNC: 0.82 MG/DL (ref 0.6–1.3)
GFR SERPL CREATININE-BSD FRML MDRD: 86 ML/MIN/1.73SQ M
GLUCOSE P FAST SERPL-MCNC: 93 MG/DL (ref 65–99)
MAGNESIUM SERPL-MCNC: 1.9 MG/DL (ref 1.9–2.7)
POTASSIUM SERPL-SCNC: 3.7 MMOL/L (ref 3.5–5.3)
PROT SERPL-MCNC: 6.9 G/DL (ref 6.4–8.4)
SODIUM SERPL-SCNC: 137 MMOL/L (ref 135–147)

## 2024-03-02 PROCEDURE — 36415 COLL VENOUS BLD VENIPUNCTURE: CPT

## 2024-03-02 PROCEDURE — 83735 ASSAY OF MAGNESIUM: CPT

## 2024-03-02 PROCEDURE — 80053 COMPREHEN METABOLIC PANEL: CPT

## 2024-03-07 ENCOUNTER — APPOINTMENT (OUTPATIENT)
Dept: LAB | Facility: CLINIC | Age: 49
End: 2024-03-07
Payer: COMMERCIAL

## 2024-03-07 ENCOUNTER — TELEPHONE (OUTPATIENT)
Dept: BARIATRICS | Facility: CLINIC | Age: 49
End: 2024-03-07

## 2024-03-07 DIAGNOSIS — E66.9 DIABETES MELLITUS TYPE 2 IN OBESE: ICD-10-CM

## 2024-03-07 DIAGNOSIS — E11.69 DIABETES MELLITUS TYPE 2 IN OBESE: ICD-10-CM

## 2024-03-07 PROCEDURE — 82340 ASSAY OF CALCIUM IN URINE: CPT

## 2024-03-07 PROCEDURE — 84560 ASSAY OF URINE/URIC ACID: CPT

## 2024-03-07 PROCEDURE — 82570 ASSAY OF URINE CREATININE: CPT

## 2024-03-07 PROCEDURE — 82131 AMINO ACIDS SINGLE QUANT: CPT

## 2024-03-07 PROCEDURE — 83735 ASSAY OF MAGNESIUM: CPT

## 2024-03-07 PROCEDURE — 84392 ASSAY OF URINE SULFATE: CPT

## 2024-03-07 PROCEDURE — 82507 ASSAY OF CITRATE: CPT

## 2024-03-07 PROCEDURE — 84300 ASSAY OF URINE SODIUM: CPT

## 2024-03-07 PROCEDURE — 84105 ASSAY OF URINE PHOSPHORUS: CPT

## 2024-03-07 PROCEDURE — 83935 ASSAY OF URINE OSMOLALITY: CPT

## 2024-03-07 PROCEDURE — 81003 URINALYSIS AUTO W/O SCOPE: CPT

## 2024-03-07 PROCEDURE — 82436 ASSAY OF URINE CHLORIDE: CPT

## 2024-03-07 PROCEDURE — 83945 ASSAY OF OXALATE: CPT

## 2024-03-07 PROCEDURE — 84133 ASSAY OF URINE POTASSIUM: CPT

## 2024-03-07 PROCEDURE — 82140 ASSAY OF AMMONIA: CPT

## 2024-03-07 NOTE — TELEPHONE ENCOUNTER
Please call in to [facility] for Mounjaro 10 mg weekly. I have sent it twice electronically and it has not confirmed that it got there.

## 2024-03-14 LAB
AMMONIA UR-SCNC: 47 MMOL/24 HR (ref 15–60)
CA H2 PHOS DIHYD 24H SATFR UR: 0.54 (ref 0.5–2)
CALCIUM 24H UR-MRATE: 201 MG/24 HR
CALCIUM/CREAT UR: 148 MG/G CREAT (ref 51–262)
CALCIUM/KG BODY WEIGHT: ABNORMAL MG/24 HR/KG
CAOX INDEX 24H UR-RTO: 6 (ref 6–10)
CHLORIDE 24H UR-SRATE: 177 MMOL/24 HR (ref 70–250)
CITRATE 24H UR-MCNC: 596 MG/24 HR
COMMENT: ABNORMAL
CREAT UR-MCNC: 1355 MG/24 HR
CREAT/BW 24H UR-RELMRAT: ABNORMAL MG/24 HR/KG
CYSTINE 24H UR-MCNC: ABNORMAL MG/DL
MAGNESIUM 24H UR-MRATE: 50 MG/24 HR (ref 30–120)
OXALATE UR-MCNC: 23 MG/24 HR (ref 20–40)
PH 24H UR: 5.54 [PH] (ref 5.8–6.2)
PHOSPHATE 24H UR-MRATE: 898 MG/24 HR (ref 600–1200)
POTASSIUM 24H UR-SCNC: 70 MMOL/24 HR (ref 20–100)
PROTEIN CATABOLIC RATE 24H UR-MRATE: ABNORMAL G/KG/24 HR
SODIUM 24H UR-SRATE: 145 MMOL/24 HR (ref 50–150)
SPECIMEN VOL 24H UR: 1420 ML/24 HR (ref 500–4000)
SULFATE 24H UR-SRATE: 45 MEQ/24 HR (ref 20–80)
URATE 24H SATFR UR: 2.4
URATE 24H UR-MRATE: 793 MG/24 HR
UUN 24H UR-MRATE: 13.16 G/24 HR (ref 6–14)

## 2024-03-20 ENCOUNTER — OFFICE VISIT (OUTPATIENT)
Dept: BARIATRICS | Facility: CLINIC | Age: 49
End: 2024-03-20
Payer: COMMERCIAL

## 2024-03-20 VITALS
HEIGHT: 62 IN | SYSTOLIC BLOOD PRESSURE: 104 MMHG | TEMPERATURE: 98.3 F | DIASTOLIC BLOOD PRESSURE: 74 MMHG | HEART RATE: 95 BPM | BODY MASS INDEX: 37.25 KG/M2 | WEIGHT: 202.4 LBS | RESPIRATION RATE: 16 BRPM

## 2024-03-20 DIAGNOSIS — F41.9 ANXIETY: ICD-10-CM

## 2024-03-20 DIAGNOSIS — E11.69 DIABETES MELLITUS TYPE 2 IN OBESE: ICD-10-CM

## 2024-03-20 DIAGNOSIS — E66.9 DIABETES MELLITUS TYPE 2 IN OBESE: ICD-10-CM

## 2024-03-20 DIAGNOSIS — K21.9 GASTROESOPHAGEAL REFLUX DISEASE, UNSPECIFIED WHETHER ESOPHAGITIS PRESENT: ICD-10-CM

## 2024-03-20 DIAGNOSIS — E66.9 OBESITY, CLASS II, BMI 35-39.9: Primary | ICD-10-CM

## 2024-03-20 PROCEDURE — 99214 OFFICE O/P EST MOD 30 MIN: CPT | Performed by: NURSE PRACTITIONER

## 2024-03-20 RX ORDER — NALTREXONE HYDROCHLORIDE 50 MG/1
50 TABLET, FILM COATED ORAL DAILY
Qty: 30 TABLET | Refills: 3 | Status: SHIPPED | OUTPATIENT
Start: 2024-03-20

## 2024-03-20 RX ORDER — BUPROPION HYDROCHLORIDE 150 MG/1
150 TABLET ORAL DAILY
Qty: 30 TABLET | Refills: 3 | Status: SHIPPED | OUTPATIENT
Start: 2024-03-20

## 2024-03-20 NOTE — ASSESSMENT & PLAN NOTE
Was taking Mounjaro, but ran out of it 3 weeks ago due to difficulty with supply chain. Will check A1C in about 2 months and make further recommendations based on the result.   Lab Results   Component Value Date    HGBA1C 6.2 (H) 08/03/2023

## 2024-03-20 NOTE — ASSESSMENT & PLAN NOTE
- Patient is pursuing Conservative Program. Previously had bundles with the dietician.   - Initial weight loss goal of 5-10% weight loss for improved health.  - S/P tubal ligation  - Not a Topamax candidate due to history of kidney stones.   - Patient denies personal history of pancreatitis. Patient also denies personal and family history of medullary thyroid cancer and multiple endocrine neoplasia type 2 (MEN 2 tumor).   - Metformin in the past caused diarrhea.   - Ozempic started 2/2022 at weight of 238.5 lbs.  - Ozempic changed to Wegovy end of September 2022 at weight of 211.8 lbs to help with further weight loss.   - Wegovy then changed to Mounjaro in Nov 2023 as A1C increased from 5.8 to 6.2 and was having more cravings.   - Was titrated up to 10 mg weekly of Mounjaro, but has been off of this for 3 weeks due to not being able to find stock of it. She has not noticed much difference in weight or appetite since being off of it.   - Discussed starting Zepbound, but will hold off and check A1C in about 2 months.  Depending upon the results of that we may restart GLP-1 therapy.  -Denies history of seizures or glaucoma.  -Struggling with stress eating, discussed starting Wellbutrin and naltrexone to mimic Contrave to assist with that, and she was agreeable.  -Start Wellbutrin  mg daily in a week or 2 add in naltrexone 50 mg tablet, take 1/2 tablet by mouth daily for 2 weeks, then 1/2 tablet twice a day.  - Potential side effects of Wellbutrin include: abdominal upset, headache, dizziness, trouble sleeping, increased blood pressure, depression/anxiety, and fatigue. Patient should call/return if he/she develops symptoms of depression/aniety. Patient should have ER evaluation if thoughts of harming self/others occur. Wellbutrin should be weaned rather than stopped abruptly.    - Side effects of naltrexone: nausea, vomiting, diarrhea, constipation, headache, anxiety, and confusion. You may feel nauseated when  consuming alcohol while taking naltrexone. Must be discontinued prior to surgery. Please notify me if you are scheduled for surgery, so that naltrexone can be stopped prior to surgery.      - Labs reviewed: CMP March 2, 2024 was within acceptable range.  -In about 2 months to check A1c and TSH.       Initial: 238.5 lbs  Last visit: 199.4 lbs BMI 36.47  Current: 202.4 pounds BMI 37.02  Change: - 37 lbs (+3 lbs since last office visit)    Goals:  Restart food logging.   8928-3419 calories per day  Increase water intake to at least 64 oz daily.  Start exercise, such as walking 2 days per week for 10 minutes and gradually increase to goal of 5 days per week for 30 minutes.   Start Wellbutrin and naltrexone.

## 2024-03-20 NOTE — PATIENT INSTRUCTIONS
Start Wellbutrin  mg daily in the morning and in 2 weeks, as long as you are tolerating Wellbutrin well, start Naltrexone.     Start Naltrexone 50 mg tablet, take 1/2 tablet by mouth daily in the morning for 2 weeks, then take 1/2 tablet twice a day.     Potential side effects of Wellbutrin include: abdominal upset, headache, dizziness, trouble sleeping, increased blood pressure, depression/anxiety, and fatigue. Patient should call/return if he/she develops symptoms of depression/aniety. Patient should have ER evaluation if thoughts of harming self/others occur. Wellbutrin should be weaned rather than stopped abruptly.      Side effects of naltrexone: nausea, vomiting, diarrhea, constipation, headache, anxiety, and confusion. You may feel nauseated when consuming alcohol while taking naltrexone. Must be discontinued prior to surgery. Please notify me if you are scheduled for surgery, so that naltrexone can be stopped prior to surgery.

## 2024-03-20 NOTE — PROGRESS NOTES
Assessment/Plan:     Obesity, Class II, BMI 35-39.9  - Patient is pursuing Conservative Program. Previously had bundles with the dietician.   - Initial weight loss goal of 5-10% weight loss for improved health.  - S/P tubal ligation  - Not a Topamax candidate due to history of kidney stones.   - Patient denies personal history of pancreatitis. Patient also denies personal and family history of medullary thyroid cancer and multiple endocrine neoplasia type 2 (MEN 2 tumor).   - Metformin in the past caused diarrhea.   - Ozempic started 2/2022 at weight of 238.5 lbs.  - Ozempic changed to Wegovy end of September 2022 at weight of 211.8 lbs to help with further weight loss.   - Wegovy then changed to Mounjaro in Nov 2023 as A1C increased from 5.8 to 6.2 and was having more cravings.   - Was titrated up to 10 mg weekly of Mounjaro, but has been off of this for 3 weeks due to not being able to find stock of it. She has not noticed much difference in weight or appetite since being off of it.   - Discussed starting Zepbound, but will hold off and check A1C in about 2 months.  Depending upon the results of that we may restart GLP-1 therapy.  -Denies history of seizures or glaucoma.  -Struggling with stress eating, discussed starting Wellbutrin and naltrexone to mimic Contrave to assist with that, and she was agreeable.  -Start Wellbutrin  mg daily in a week or 2 add in naltrexone 50 mg tablet, take 1/2 tablet by mouth daily for 2 weeks, then 1/2 tablet twice a day.  - Potential side effects of Wellbutrin include: abdominal upset, headache, dizziness, trouble sleeping, increased blood pressure, depression/anxiety, and fatigue. Patient should call/return if he/she develops symptoms of depression/aniety. Patient should have ER evaluation if thoughts of harming self/others occur. Wellbutrin should be weaned rather than stopped abruptly.    - Side effects of naltrexone: nausea, vomiting, diarrhea, constipation, headache,  anxiety, and confusion. You may feel nauseated when consuming alcohol while taking naltrexone. Must be discontinued prior to surgery. Please notify me if you are scheduled for surgery, so that naltrexone can be stopped prior to surgery.      - Labs reviewed: CMP March 2, 2024 was within acceptable range.  -In about 2 months to check A1c and TSH.       Initial: 238.5 lbs  Last visit: 199.4 lbs BMI 36.47  Current: 202.4 pounds BMI 37.02  Change: - 37 lbs (+3 lbs since last office visit)    Goals:  Restart food logging.   9210-4902 calories per day  Increase water intake to at least 64 oz daily.  Start exercise, such as walking 2 days per week for 10 minutes and gradually increase to goal of 5 days per week for 30 minutes.   Start Wellbutrin and naltrexone.    Diabetes mellitus type 2 in obese   Was taking Mounjaro, but ran out of it 3 weeks ago due to difficulty with supply chain. Will check A1C in about 2 months and make further recommendations based on the result.   Lab Results   Component Value Date    HGBA1C 6.2 (H) 08/03/2023       GERD (gastroesophageal reflux disease)  - Taking pepcid. May improve with weight loss and lifestyle modification. Continue management with prescribing provider.            Sherice was seen today for follow-up.    Diagnoses and all orders for this visit:    Obesity, Class II, BMI 35-39.9  -     Hemoglobin A1C; Future  -     TSH, 3rd generation with Free T4 reflex; Future  -     naltrexone (REVIA) 50 mg tablet; Take 1 tablet (50 mg total) by mouth daily    Diabetes mellitus type 2 in obese   -     Hemoglobin A1C; Future  -     TSH, 3rd generation with Free T4 reflex; Future    Anxiety  -     buPROPion (Wellbutrin XL) 150 mg 24 hr tablet; Take 1 tablet (150 mg total) by mouth daily    Gastroesophageal reflux disease, unspecified whether esophagitis present            Follow up in approximately  2 month nurse visit and 4 months  with Non-Surgical Physician/Advanced  Practitioner.    Subjective:   Chief Complaint   Patient presents with    Follow-up     MWM-4M F/u       Patient ID: Sherice Rubio  is a 48 y.o. female with excess weight/obesity here to pursue weight management.  Patient is pursuing Conservative Program.    Most recent notes and records were reviewed.    HPI    Wt Readings from Last 10 Encounters:   03/20/24 91.8 kg (202 lb 6.4 oz)   01/26/24 92.6 kg (204 lb 3.2 oz)   11/08/23 90.4 kg (199 lb 6.4 oz)   07/06/23 90.5 kg (199 lb 9.6 oz)   05/31/23 89.4 kg (197 lb)   03/22/23 91.6 kg (202 lb)   03/15/23 91.1 kg (200 lb 12.8 oz)   01/23/23 92.5 kg (204 lb)   01/12/23 92.7 kg (204 lb 6.4 oz)   10/26/22 96.5 kg (212 lb 11.2 oz)     Changed from Wegovy to Mounjaro in Nov 2023 due to A1C increasing and having an increase in cravings for sweets.     Was taking Mounjaro 10 mg weekly, but ran out of it 3 weeks ago, as she could not find stock.     Has been food logging.     Has been stress eating.     Not skipping meals.     Metformin in the past - caused diarrhea. Tired both regular release and extended release and all caused diarrhea.     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    Family History   Problem Relation Age of Onset    Diabetes Mother     Heart attack Father     Heart disease Father         myocardial infarction    Hyperlipidemia Father     Hypertension Father     Coronary artery disease Father     Cancer Father 55        malignant neoplasm of urinary bladder    Multiple sclerosis  "Sister     No Known Problems Sister     No Known Problems Son     No Known Problems Son     COPD Maternal Grandmother     No Known Problems Maternal Grandfather     Liver cancer Paternal Grandmother 70    Pancreatic cancer Paternal Grandmother 70    Diabetes Paternal Grandmother     Stomach cancer Paternal Grandfather         unknown age    Pancreatic cancer Maternal Aunt 73    No Known Problems Maternal Aunt     Endometrial cancer Paternal Aunt 64    Breast cancer Neg Hx     Ovarian cancer Neg Hx     Colon cancer Neg Hx         Review of Systems   HENT:  Negative for sore throat.    Respiratory:  Negative for cough and shortness of breath.    Cardiovascular:  Negative for chest pain and palpitations.   Gastrointestinal:  Negative for abdominal pain, constipation, diarrhea, nausea and vomiting.        + GERD somewhat controlled with medication     Musculoskeletal:  Negative for arthralgias and back pain.   Skin:  Negative for rash.   Psychiatric/Behavioral:  Negative for suicidal ideas (or HI).         Denies depression   + anxiety situational        Objective:  /74   Pulse 95   Temp 98.3 °F (36.8 °C)   Resp 16   Ht 5' 2\" (1.575 m)   Wt 91.8 kg (202 lb 6.4 oz)   BMI 37.02 kg/m²     Physical Exam  Vitals and nursing note reviewed.        Constitutional   General appearance: Abnormal.  well developed and obese.   Eyes No conjunctival injection.   Ears, Nose, Mouth, and Throat Oral mucosa moist.   Pulmonary   Respiratory effort: No increased work of breathing or signs of respiratory distress.     Cardiovascular     Examination of extremities for edema and/or varicosities: Normal.  no edema.   Abdomen   Abdomen: Abnormal.  The abdomen was obese.    Musculoskeletal   Normal range of motion  Neurological   Gait and station: Normal.    Psychiatric   Orientation to person, place and time: Normal.    Affect: appropriate        "

## 2024-03-25 DIAGNOSIS — N13.30 HYDRONEPHROSIS OF LEFT KIDNEY: ICD-10-CM

## 2024-03-25 DIAGNOSIS — N20.0 NEPHROLITHIASIS: ICD-10-CM

## 2024-03-25 DIAGNOSIS — E87.1 HYPONATREMIA: ICD-10-CM

## 2024-03-25 DIAGNOSIS — N13.2 HYDRONEPHROSIS WITH RENAL AND URETERAL CALCULUS OBSTRUCTION: ICD-10-CM

## 2024-03-25 RX ORDER — HYDROCHLOROTHIAZIDE 25 MG/1
25 TABLET ORAL DAILY
Qty: 90 TABLET | Refills: 0 | Status: SHIPPED | OUTPATIENT
Start: 2024-03-25

## 2024-05-08 DIAGNOSIS — N20.0 NEPHROLITHIASIS: ICD-10-CM

## 2024-05-08 RX ORDER — POTASSIUM CITRATE 10 MEQ/1
10 TABLET, EXTENDED RELEASE ORAL 2 TIMES DAILY
Qty: 180 TABLET | Refills: 0 | Status: SHIPPED | OUTPATIENT
Start: 2024-05-08

## 2024-05-16 ENCOUNTER — PATIENT MESSAGE (OUTPATIENT)
Dept: BARIATRICS | Facility: CLINIC | Age: 49
End: 2024-05-16

## 2024-05-16 ENCOUNTER — APPOINTMENT (OUTPATIENT)
Dept: LAB | Facility: CLINIC | Age: 49
End: 2024-05-16
Payer: COMMERCIAL

## 2024-05-16 DIAGNOSIS — E11.9 TYPE 2 DIABETES MELLITUS WITHOUT COMPLICATION, WITHOUT LONG-TERM CURRENT USE OF INSULIN (HCC): Primary | ICD-10-CM

## 2024-05-16 DIAGNOSIS — E66.9 OBESITY, CLASS II, BMI 35-39.9: ICD-10-CM

## 2024-05-16 DIAGNOSIS — E66.9 TYPE 2 DIABETES MELLITUS WITH OBESITY  (HCC): ICD-10-CM

## 2024-05-16 DIAGNOSIS — E11.69 TYPE 2 DIABETES MELLITUS WITH OBESITY  (HCC): ICD-10-CM

## 2024-05-16 LAB
EST. AVERAGE GLUCOSE BLD GHB EST-MCNC: 120 MG/DL
HBA1C MFR BLD: 5.8 %
TSH SERPL DL<=0.05 MIU/L-ACNC: 1.4 UIU/ML (ref 0.45–4.5)

## 2024-05-16 PROCEDURE — 83036 HEMOGLOBIN GLYCOSYLATED A1C: CPT

## 2024-05-16 PROCEDURE — 84443 ASSAY THYROID STIM HORMONE: CPT

## 2024-05-16 PROCEDURE — 36415 COLL VENOUS BLD VENIPUNCTURE: CPT

## 2024-05-20 ENCOUNTER — CLINICAL SUPPORT (OUTPATIENT)
Dept: BARIATRICS | Facility: CLINIC | Age: 49
End: 2024-05-20

## 2024-05-20 ENCOUNTER — TELEPHONE (OUTPATIENT)
Dept: BARIATRICS | Facility: CLINIC | Age: 49
End: 2024-05-20

## 2024-05-20 VITALS
HEIGHT: 62 IN | WEIGHT: 206.2 LBS | DIASTOLIC BLOOD PRESSURE: 74 MMHG | RESPIRATION RATE: 16 BRPM | SYSTOLIC BLOOD PRESSURE: 120 MMHG | HEART RATE: 96 BPM | BODY MASS INDEX: 37.94 KG/M2

## 2024-05-20 DIAGNOSIS — R63.5 ABNORMAL WEIGHT GAIN: Primary | ICD-10-CM

## 2024-05-20 PROCEDURE — RECHECK

## 2024-05-20 NOTE — PROGRESS NOTES
Patient last visit weight:202lb 6.4oz  Patient current visit weight: 206.2lb    If you are taking phentermine or other oral weight loss medications, are you experiencing any of the following symptoms:  Headache: NO  Blurred Vision: NO  Chest Pain: NO  Palpitations:NO  Insomnia: NO  SPECIFY ORAL MEDICATION AND DOSAGE: WELLBUTRIN AND NALTREXONE    If you are taking an injectable medication,  are you experiencing any of the following symptoms:  Bloating:   Nausea:  Vomiting:   Constipation:   Diarrhea:  SPECIFY INJECTABLE MEDICATION AND CURRENT DOSAGE:      Vitals:    Is BP less than 100/60?NO  Is BP greater than 140/90?NO  Is HR greater than 100?NO  **If yes to any of the above, have patient relax and repeat in 5-10 minutes**    Repeat values:    Is BP less than 100/60?  Is BP greater than 140/90?  Is HR greater than 100?  **If values remain outside of ranges above, please consult provider for next steps**

## 2024-05-20 NOTE — TELEPHONE ENCOUNTER
Patient came in for a nurse visit today. Patient stating that she is not happy with the way the wellbutrin and naltrexone is working for her. Patient stating that she would like to go back on a weight loss inject.

## 2024-05-21 ENCOUNTER — TELEPHONE (OUTPATIENT)
Dept: NEPHROLOGY | Facility: CLINIC | Age: 49
End: 2024-05-21

## 2024-05-22 ENCOUNTER — OFFICE VISIT (OUTPATIENT)
Dept: NEPHROLOGY | Facility: CLINIC | Age: 49
End: 2024-05-22
Payer: COMMERCIAL

## 2024-05-22 VITALS
WEIGHT: 207 LBS | BODY MASS INDEX: 38.09 KG/M2 | HEIGHT: 62 IN | DIASTOLIC BLOOD PRESSURE: 62 MMHG | HEART RATE: 72 BPM | SYSTOLIC BLOOD PRESSURE: 95 MMHG

## 2024-05-22 DIAGNOSIS — N13.30 HYDRONEPHROSIS OF LEFT KIDNEY: ICD-10-CM

## 2024-05-22 DIAGNOSIS — E87.1 HYPONATREMIA: ICD-10-CM

## 2024-05-22 DIAGNOSIS — N13.2 HYDRONEPHROSIS WITH RENAL AND URETERAL CALCULUS OBSTRUCTION: Primary | ICD-10-CM

## 2024-05-22 DIAGNOSIS — E11.9 TYPE 2 DIABETES MELLITUS WITHOUT COMPLICATION, WITHOUT LONG-TERM CURRENT USE OF INSULIN (HCC): ICD-10-CM

## 2024-05-22 DIAGNOSIS — N20.0 NEPHROLITHIASIS: ICD-10-CM

## 2024-05-22 PROCEDURE — 99214 OFFICE O/P EST MOD 30 MIN: CPT | Performed by: INTERNAL MEDICINE

## 2024-05-22 NOTE — PATIENT INSTRUCTIONS
Kidney Stones   AMBULATORY CARE:   Kidney stones  form in the urinary system when the water and waste in your urine are out of balance. When this happens, certain types of waste crystals separate from the urine. The crystals build up and form kidney stones. Kidney stones can be made of uric acid, calcium, phosphate, or oxalate crystals. You may have 1 or more kidney stones.       Common symptoms include the following:   Pain in the middle of your back that moves across to your side or that may spread to your groin    Nausea and vomiting    Urge to urinate often, burning feeling when you urinate, or pink or red urine    Tenderness in your lower back, side, or stomach    Seek care immediately if:   You are vomiting and it is not relieved with medicine.      Call your doctor or kidney specialist if:   You have a fever.    You have trouble urinating.    You see blood in your urine.    You have severe pain.    You have any questions or concerns about your condition or care.    Treatment for kidney stones  may include any of the following:  NSAIDs , such as ibuprofen, help decrease swelling, pain, and fever. This medicine is available with or without a doctor's order. NSAIDs can cause stomach bleeding or kidney problems in certain people. If you take blood thinner medicine, always ask your healthcare provider if NSAIDs are safe for you. Always read the medicine label and follow directions.    Acetaminophen  decreases pain and fever. It is available without a doctor's order. Ask how much to take and how often to take it. Follow directions. Read the labels of all other medicines you are using to see if they also contain acetaminophen, or ask your doctor or pharmacist. Acetaminophen can cause liver damage if not taken correctly.    Prescription pain medicine  may be given. Ask your healthcare provider how to take this medicine safely. Some prescription pain medicines contain acetaminophen. Do not take other medicines that  contain acetaminophen without talking to your healthcare provider. Too much acetaminophen may cause liver damage. Prescription pain medicine may cause constipation. Ask your healthcare provider how to prevent or treat constipation.     Medicines  to balance your electrolytes may be needed.    A procedure or surgery  to remove the kidney stones may be needed if they do not pass on their own. Your treatment will depend on the size and location of your kidney stones.    What you can do to manage kidney stones:   Drink more liquids.  Your healthcare provider may tell you to drink at least 8 to 12 (eight-ounce) cups of liquids each day. This helps flush out the kidney stones when you urinate. Water is the best liquid to drink.    Strain your urine every time you go to the bathroom.  Urinate through a strainer or a piece of thin cloth to catch the stones. Take the stones to your healthcare provider so they can be sent to the lab for tests. This will help your healthcare providers plan the best treatment for you.         Ask if you should avoid any foods.  You may need to limit oxalate. Oxalate is a chemical found in some plant foods. The most common type of kidney stone is made up of crystals that contain calcium and oxalate. Your healthcare provider or dietitian may recommend that you limit oxalate if you get this type of kidney stone often. You may need to limit how much sodium (salt) or protein you eat. Ask for information about the best foods for you.         Be physically active as directed.  Your stones may pass more easily if you stay active. Physical activity can also help you manage your weight. Ask about the best activities for you.       After you pass the kidney stones:  Your healthcare provider may  order a 24-hour urine test. Results from a 24-hour urine test will help your healthcare provider plan ways to prevent more stones from forming. Your healthcare provider will give you more instructions.  Follow up  with your doctor or kidney specialist as directed:  Write down your questions so you remember to ask them during your visits.  © Copyright [employer] 2023 Information is for End User's use only and may not be sold, redistributed or otherwise used for commercial purposes.  The above information is an  only. It is not intended as medical advice for individual conditions or treatments. Talk to your doctor, nurse or pharmacist before following any medical regimen to see if it is safe and effective for you.

## 2024-05-22 NOTE — PROGRESS NOTES
OFFICE FOLLOW UP - Nephrology   Sherice Rubio 49 y.o. female MRN: [id number]    Encounter: [id number]        ASSESSMENT    47-year-old female with a past medical history the past medical history of nephrolithiasis presents for follow-up evaluation    Nephrolithiasis  Hydronephrosis with renal and ureteral calculus obstruction  Hydronephrosis of left kidney  Hyponatremia  GERD (gastroesophageal reflux disease)  Hypomagnesemia    DISCUSSION & PLAN    1. Nephrolithiasis  -Currently medically stable  -continue to optimize urine volume  -Hydrochlorothiazide 25 mg daily, urine calcium has decreased by 1/2  -Potassiums citrate 10 mEq 2 times daily to alkalinize the urine.  She is taking once daily will have her take twice daily  -We will repeat a renal ultrasound now  -Continue magnesium oxide  -uric acid supersaturation is high, serum uric acid low. Continue alkalinization of urine    2. Hydronephrosis with ureteral calculus obstruction  -given that she is asymptomatic we can check a renal us every 2 years as this likely won't     3. Hyponatremia  -Electrolytes, acid-base status stable    4. GERD  -acid reflux improved on pepcid and now Protonix    5. Hypomagnesemia  -Continue magnesium supplement at this point    6. hypokalemia  -Stable on potassiums citrate and thiazide    7. Elevated BMI  -now seeing weight loss center started on GLP 1 agonist , A1C improving and losing weight    Continue yearly 24 hour urine stone risk profile.  Renal ultrasound next year and follow up. Will continue current medications    HPI: Sherice Rubio is a 49 y.o. female who is here for follow-up    She has been doing relatively well no recent stone formation, tolerating her medications, lost weight but this is now stagnant. Feels well. No acute complaints no cp, sob, fevers, chills  Switching back to ozempic was on monjoaro  Following with weight management    ROS:   Review of systems was otherwise  negative    Allergies: Patient has no known allergies.    Medications:   Current Outpatient Medications:     Calcium Carb-Cholecalciferol (CALCIUM/VITAMIN D PO), Take 1 tablet by mouth every other day, Disp: , Rfl:     famotidine (PEPCID) 20 mg tablet, Take 20 mg by mouth daily, Disp: , Rfl:     hydroCHLOROthiazide 25 mg tablet, Take 1 tablet (25 mg total) by mouth daily, Disp: 90 tablet, Rfl: 0    magnesium oxide (MAG-OX) 400 mg, Take 1 tablet (400 mg total) by mouth daily, Disp: 90 tablet, Rfl: 3    potassium citrate (UROCIT-K) 10 mEq, TAKE ONE TABLET BY MOUTH 2 TIMES A DAY, Disp: 180 tablet, Rfl: 0    semaglutide, 0.25 or 0.5 mg/dose, (Ozempic, 0.25 or 0.5 MG/DOSE,) 2 mg/3 mL injection pen, Take 0.25 mg subcutaneously once a week for 4 weeks then increase to 0.5 mg subcutaneously once a week., Disp: 3 mL, Rfl: 3    Past Medical History:   Diagnosis Date    Diabetes mellitus (HCC)     DJD (degenerative joint disease)     Eczema     GERD (gastroesophageal reflux disease)     Gestational diabetes     Hydronephrosis     last assessed 17 documented resolved 17    Kidney stones 2020    Menorrhagia with regular cycle 2018    treated with Lysteda    Nephrolithiasis     Obesity     Seasonal allergies     Varicella      Past Surgical History:   Procedure Laterality Date     SECTION      X 2  and      CYSTOSCOPY      DILATION AND CURETTAGE OF UTERUS      x 2 for missed     NM CYSTO/URETERO W/LITHOTRIPSY &INDWELL STENT INSRT Right 2016    Procedure: CYSTOSCOPY; URETEROSCOPY; HOLMIUM LASER LITHOTRIPSY; BASKET STONE EXTRACTION; RETROGRADE PYELOGRAM; STENT PLACEMENT ;  Surgeon: Sebastien Krishnamurthy MD;  Location:  MAIN OR;  Service: Urology    NM CYSTO/URETERO W/LITHOTRIPSY &INDWELL STENT INSRT Left 2018    Procedure: CYSTOSCOPY, LEFT URETEROSCOPY AND RENAL ENDOSCOPY WITH LITHOTRIPSY OF RENAL CALCULUS WITH HOLMIUM LASER,  BILATERAL RETROGRADE PYELOGRAM AND INSERTION OF LEFT URETERAL  "STENT;  Surgeon: Luisito Chaudhari MD;  Location: [facility];  Service: Urology    TONSILLECTOMY  2012    TUBAL LIGATION  2014    at time of     WISDOM TOOTH EXTRACTION       Family History   Problem Relation Age of Onset    Diabetes Mother     Heart attack Father     Heart disease Father         myocardial infarction    Hyperlipidemia Father     Hypertension Father     Coronary artery disease Father     Cancer Father 55        malignant neoplasm of urinary bladder    Multiple sclerosis Sister     No Known Problems Sister     No Known Problems Son     No Known Problems Son     COPD Maternal Grandmother     No Known Problems Maternal Grandfather     Liver cancer Paternal Grandmother 70    Pancreatic cancer Paternal Grandmother 70    Diabetes Paternal Grandmother     Stomach cancer Paternal Grandfather         unknown age    Pancreatic cancer Maternal Aunt 73    No Known Problems Maternal Aunt     Endometrial cancer Paternal Aunt 64    Breast cancer Neg Hx     Ovarian cancer Neg Hx     Colon cancer Neg Hx       reports that she has never smoked. She has never used smokeless tobacco. She reports current alcohol use. She reports that she does not use drugs.      Physical Exam:   Vitals:    24 1002   BP: 95/62   BP Location: Left arm   Patient Position: Sitting   Cuff Size: Large   Pulse: 72   Weight: 93.9 kg (207 lb)   Height: 5' 2\" (1.575 m)     Body mass index is 37.86 kg/m².    General: conscious, cooperative, in not acute distress  Eyes: conjunctivae pink, anicteric sclerae  ENT: lips and mucous membranes moist  Neck: supple, no JVD  Chest: clear breath sounds bilateral, no crackles, ronchus or wheezings  CVS: distinct S1 & S2, normal rate, regular rhythm  Abdomen: soft, non-tender, non-distended, normoactive bowel sounds  Extremities: No edema  Skin: no rash  Neuro: awake, alert, oriented      Lab Results:    Results for orders placed or performed in visit on 24   Hemoglobin A1C   Result Value Ref " "Range    Hemoglobin A1C 5.8 (H) Normal 4.0-5.6%; PreDiabetic 5.7-6.4%; Diabetic >=6.5%; Glycemic control for adults with diabetes <7.0% %     mg/dl   TSH, 3rd generation with Free T4 reflex   Result Value Ref Range    TSH 3RD GENERATON 1.403 0.450 - 4.500 uIU/mL       Portions of the record may have been created with voice recognition software. Occasional wrong word or \"sound a like\" substitutions may have occurred due to the inherent limitations of voice recognition software. Read the chart carefully and recognize, using context, where substitutions have occurred.If you have any questions, please contact the dictating provider.  "

## 2024-06-22 DIAGNOSIS — N13.30 HYDRONEPHROSIS OF LEFT KIDNEY: ICD-10-CM

## 2024-06-22 DIAGNOSIS — N13.2 HYDRONEPHROSIS WITH RENAL AND URETERAL CALCULUS OBSTRUCTION: ICD-10-CM

## 2024-06-22 DIAGNOSIS — E87.1 HYPONATREMIA: ICD-10-CM

## 2024-06-22 DIAGNOSIS — N20.0 NEPHROLITHIASIS: ICD-10-CM

## 2024-06-22 RX ORDER — HYDROCHLOROTHIAZIDE 25 MG/1
25 TABLET ORAL DAILY
Qty: 90 TABLET | Refills: 1 | Status: SHIPPED | OUTPATIENT
Start: 2024-06-22

## 2024-07-22 ENCOUNTER — OFFICE VISIT (OUTPATIENT)
Dept: BARIATRICS | Facility: CLINIC | Age: 49
End: 2024-07-22
Payer: COMMERCIAL

## 2024-07-22 VITALS
SYSTOLIC BLOOD PRESSURE: 118 MMHG | HEIGHT: 62 IN | WEIGHT: 206 LBS | DIASTOLIC BLOOD PRESSURE: 70 MMHG | TEMPERATURE: 98.3 F | BODY MASS INDEX: 37.91 KG/M2 | HEART RATE: 91 BPM | RESPIRATION RATE: 14 BRPM

## 2024-07-22 DIAGNOSIS — E11.9 TYPE 2 DIABETES MELLITUS WITHOUT COMPLICATION, WITHOUT LONG-TERM CURRENT USE OF INSULIN (HCC): ICD-10-CM

## 2024-07-22 DIAGNOSIS — E66.9 OBESITY, CLASS II, BMI 35-39.9: Primary | ICD-10-CM

## 2024-07-22 PROCEDURE — 99214 OFFICE O/P EST MOD 30 MIN: CPT | Performed by: PHYSICIAN ASSISTANT

## 2024-07-22 NOTE — ASSESSMENT & PLAN NOTE
- Patient is pursuing Conservative Program. Previously had bundles with the dietician.   - Initial weight loss goal of 5-10% weight loss for improved health.  - Labs reviewed: CMP March 2, 2024 was within acceptable range.       Initial: 238.5 lbs  Last visit: 202.4 pounds BMI 37.02  Current: 206  Change: - 32.5 lbs    Goals:  Restart food logging.   8313-8981 calories per day  Increase water intake to at least 64 oz daily.  Recommend to start to increase activity    To continue on ozempic and increase dose. - S/P tubal ligation  Past medications: wegovy/mounjaro-stopped due to supply, wellbutrin-no effect, naltrexone side effects.

## 2024-07-22 NOTE — ASSESSMENT & PLAN NOTE
Lab Results   Component Value Date    HGBA1C 5.8 (H) 05/16/2024   To continue on ozempic and increase dose to 1mg.  Denies any side effects.

## 2024-07-22 NOTE — PROGRESS NOTES
Assessment/Plan:    Obesity, Class II, BMI 35-39.9  - Patient is pursuing Conservative Program. Previously had bundles with the dietician.   - Initial weight loss goal of 5-10% weight loss for improved health.  - Labs reviewed: [facility] March 2, 2024 was within acceptable range.       Initial: 238.5 lbs  Last visit: 202.4 pounds BMI 37.02  Current: 206  Change: - 32.5 lbs    Goals:  Restart food logging.   4958-0527 calories per day  Increase water intake to at least 64 oz daily.  Recommend to start to increase activity    To continue on ozempic and increase dose. - S/P tubal ligation  Past medications: wegovy/mounjaro-stopped due to supply, wellbutrin-no effect, naltrexone side effects.     Type 2 diabetes mellitus without complication, without long-term current use of insulin ([facility])    Lab Results   Component Value Date    HGBA1C 5.8 (H) 05/16/2024   To continue on ozempic and increase dose to 1mg.  Denies any side effects.         Return in about 6 months (around 1/22/2025) for 3 month nurse visit.       Diagnoses and all orders for this visit:    Obesity, Class II, BMI 35-39.9    Type 2 diabetes mellitus without complication, without long-term current use of insulin ([facility])  -     semaglutide, 1 mg/dose, (Ozempic, 1 MG/DOSE,) 4 mg/3 mL injection pen; Inject 0.75 mL (1 mg total) under the skin every 7 days          Subjective:   Chief Complaint   Patient presents with    Follow-up     MWM- 4mo F/u; Waist 44in        Patient ID: Sherice Rubio  is a 49 y.o. female with excess weight/obesity here to pursue weight managment.  Patient is pursuing Conservative Program.     HPI  She is on 0.5mg ozempic right now.  She was on ozempic and mounjaro but due to supply had to stop.She was started on wellbutrin and naltrexone prior but it was not helping with appetite control and she was having side effects with naltrexone. The ozempic does decrease her appetite. She was on a cruise and jsut came back.     Wt Readings from Last  10 Encounters:   24 93.4 kg (206 lb)   24 93.9 kg (207 lb)   24 93.5 kg (206 lb 3.2 oz)   24 91.8 kg (202 lb 6.4 oz)   24 92.6 kg (204 lb 3.2 oz)   23 90.4 kg (199 lb 6.4 oz)   23 90.5 kg (199 lb 9.6 oz)   23 89.4 kg (197 lb)   23 91.6 kg (202 lb)   03/15/23 91.1 kg (200 lb 12.8 oz)         Diet Recall:  Eggs or PB toast  Tries to do 150 haley or less  Tries to stay around 400 caloreis  Tries to do 150 haley or less  Protein , vegetable  The following portions of the patient's history were reviewed and updated as appropriate: She  has a past medical history of Diabetes mellitus ([facility]), DJD (degenerative joint disease), Eczema, GERD (gastroesophageal reflux disease), Gestational diabetes, Hydronephrosis, Kidney stones (2020), Menorrhagia with regular cycle (2018), Nephrolithiasis, Obesity, Seasonal allergies, and Varicella.  She   Patient Active Problem List    Diagnosis Date Noted    Type 2 diabetes mellitus with obesity  ([facility]) 2023    Obesity, Class II, BMI 35-39.9 2023    Type 2 diabetes mellitus without complication, without long-term current use of insulin ([facility]) 2021    BMI 40.0-44.9, adult ([facility]) 2021    Visit for screening mammogram 2019    Hyponatremia 01/15/2019    Nephrolithiasis 2018    Hydronephrosis with renal and ureteral calculus obstruction 2018    Hydronephrosis of left kidney 2018    Dyslipidemia 2017    Lumbar degenerative disc disease 2016    GERD (gastroesophageal reflux disease) 2016    Allergic rhinitis 06/10/2015     She  has a past surgical history that includes Tubal ligation (); Tonsillectomy (); pr cysto/uretero w/lithotripsy &indwell stent insrt (Right, 2016); Cystoscopy; [place] tooth extraction; Dilation and curettage of uterus; pr cysto/uretero w/lithotripsy &indwell stent insrt (Left, 2018); and  section.  Her family history includes COPD in  her maternal grandmother; Cancer (age of onset: 55) in her father; Coronary artery disease in her father; Diabetes in her mother and paternal grandmother; Endometrial cancer (age of onset: 64) in her paternal aunt; Heart attack in her father; Heart disease in her father; Hyperlipidemia in her father; Hypertension in her father; Liver cancer (age of onset: 70) in her paternal grandmother; Multiple sclerosis in her sister; No Known Problems in her maternal aunt, maternal grandfather, sister, son, and son; Pancreatic cancer (age of onset: 70) in her paternal grandmother; Pancreatic cancer (age of onset: 73) in her maternal aunt; Stomach cancer in her paternal grandfather.  She  reports that she has never smoked. She has never used smokeless tobacco. She reports current alcohol use. She reports that she does not use drugs.  Current Outpatient Medications   Medication Sig Dispense Refill    Calcium Carb-Cholecalciferol (CALCIUM/VITAMIN D PO) Take 1 tablet by mouth every other day      famotidine (PEPCID) 20 mg tablet Take 20 mg by mouth daily      hydroCHLOROthiazide 25 mg tablet Take 1 tablet (25 mg total) by mouth daily 90 tablet 1    magnesium oxide (MAG-OX) 400 mg Take 1 tablet (400 mg total) by mouth daily 90 tablet 3    potassium citrate (UROCIT-K) 10 mEq TAKE ONE TABLET BY MOUTH 2 TIMES A  tablet 0    semaglutide, 1 mg/dose, (Ozempic, 1 MG/DOSE,) 4 mg/3 mL injection pen Inject 0.75 mL (1 mg total) under the skin every 7 days 3 mL 1     No current facility-administered medications for this visit.     Current Outpatient Medications on File Prior to Visit   Medication Sig    Calcium Carb-Cholecalciferol (CALCIUM/VITAMIN D PO) Take 1 tablet by mouth every other day    famotidine (PEPCID) 20 mg tablet Take 20 mg by mouth daily    hydroCHLOROthiazide 25 mg tablet Take 1 tablet (25 mg total) by mouth daily    magnesium oxide (MAG-OX) 400 mg Take 1 tablet (400 mg total) by mouth daily    potassium citrate  "(UROCIT-K) 10 mEq TAKE ONE TABLET BY MOUTH 2 TIMES A DAY     No current facility-administered medications on file prior to visit.     She has No Known Allergies..    Review of Systems   Constitutional:  Negative for fever.   Respiratory:  Negative for shortness of breath.    Cardiovascular:  Negative for chest pain and palpitations.   Gastrointestinal:  Negative for abdominal pain, constipation, diarrhea and vomiting.   Genitourinary:  Negative for difficulty urinating.   Musculoskeletal:  Negative for arthralgias and back pain.   Skin:  Negative for rash.   Neurological:  Negative for headaches.   Psychiatric/Behavioral:  Negative for dysphoric mood. The patient is not nervous/anxious.        Objective:    /70   Pulse 91   Temp 98.3 °F (36.8 °C) (Tympanic)   Resp 14   Ht 5' 2\" (1.575 m)   Wt 93.4 kg (206 lb)   BMI 37.68 kg/m²      Physical Exam  Vitals and nursing note reviewed.   Constitutional:       General: She is not in acute distress.     Appearance: She is well-developed. She is obese.   HENT:      Head: Normocephalic and atraumatic.   Eyes:      Conjunctiva/sclera: Conjunctivae normal.   Neck:      Thyroid: No thyromegaly.   Pulmonary:      Effort: Pulmonary effort is normal. No respiratory distress.   Skin:     Findings: No rash (visible).   Neurological:      Mental Status: She is alert and oriented to person, place, and time.   Psychiatric:         Mood and Affect: Mood normal.         Behavior: Behavior normal.        "

## 2024-08-11 ENCOUNTER — APPOINTMENT (OUTPATIENT)
Dept: LAB | Facility: CLINIC | Age: 49
End: 2024-08-11

## 2024-08-11 DIAGNOSIS — Z00.8 HEALTH EXAMINATION IN POPULATION SURVEY: ICD-10-CM

## 2024-08-11 LAB
CHOLEST SERPL-MCNC: 141 MG/DL
EST. AVERAGE GLUCOSE BLD GHB EST-MCNC: 131 MG/DL
HBA1C MFR BLD: 6.2 %
HDLC SERPL-MCNC: 39 MG/DL
LDLC SERPL CALC-MCNC: 88 MG/DL (ref 0–100)
NONHDLC SERPL-MCNC: 102 MG/DL
TRIGL SERPL-MCNC: 68 MG/DL

## 2024-08-11 PROCEDURE — 80061 LIPID PANEL: CPT

## 2024-08-11 PROCEDURE — 36415 COLL VENOUS BLD VENIPUNCTURE: CPT

## 2024-08-11 PROCEDURE — 83036 HEMOGLOBIN GLYCOSYLATED A1C: CPT

## 2024-08-26 ENCOUNTER — TELEPHONE (OUTPATIENT)
Dept: BARIATRICS | Facility: CLINIC | Age: 49
End: 2024-08-26

## 2024-08-26 DIAGNOSIS — E11.9 TYPE 2 DIABETES MELLITUS WITHOUT COMPLICATION, WITHOUT LONG-TERM CURRENT USE OF INSULIN (HCC): Primary | ICD-10-CM

## 2024-08-26 NOTE — TELEPHONE ENCOUNTER
Patient called in for a request to increase her Ozempic dose. She is currently on the 1 mg and was told to send a [employer] message when she wanted to increase, but it was not letting her. She is due for her next injection on Sunday.    Please review and send in if appropriate to [facility] - [place], [place] - [address] [phone]

## 2024-09-16 ENCOUNTER — HOSPITAL ENCOUNTER (OUTPATIENT)
Dept: MAMMOGRAPHY | Facility: IMAGING CENTER | Age: 49
Discharge: HOME | End: 2024-09-16
Payer: COMMERCIAL

## 2024-09-16 VITALS — WEIGHT: 205 LBS | HEIGHT: 62 IN | BODY MASS INDEX: 37.73 KG/M2

## 2024-09-16 DIAGNOSIS — Z12.31 ENCOUNTER FOR SCREENING MAMMOGRAM FOR BREAST CANCER: ICD-10-CM

## 2024-09-16 PROCEDURE — 77067 SCR MAMMO BI INCL CAD: CPT

## 2024-09-16 PROCEDURE — 77063 BREAST TOMOSYNTHESIS BI: CPT

## 2024-10-21 ENCOUNTER — CLINICAL SUPPORT (OUTPATIENT)
Dept: BARIATRICS | Facility: CLINIC | Age: 49
End: 2024-10-21

## 2024-10-21 VITALS
HEART RATE: 87 BPM | HEIGHT: 62 IN | TEMPERATURE: 97.6 F | DIASTOLIC BLOOD PRESSURE: 72 MMHG | WEIGHT: 206 LBS | OXYGEN SATURATION: 97 % | SYSTOLIC BLOOD PRESSURE: 118 MMHG | RESPIRATION RATE: 17 BRPM | BODY MASS INDEX: 37.91 KG/M2

## 2024-10-21 DIAGNOSIS — R63.5 ABNORMAL WEIGHT GAIN: Primary | ICD-10-CM

## 2024-10-21 DIAGNOSIS — E11.9 TYPE 2 DIABETES MELLITUS WITHOUT COMPLICATION, WITHOUT LONG-TERM CURRENT USE OF INSULIN (HCC): ICD-10-CM

## 2024-10-21 PROCEDURE — RECHECK

## 2024-10-21 NOTE — PROGRESS NOTES
Patient last visit weight:206lbs  Patient current visit weight:206lb    If you are taking phentermine or other oral weight loss medications, are you experiencing any of the following symptoms:  Headache:   Blurred Vision:   Chest Pain:   Palpitations:  Insomnia:   SPECIFY ORAL MEDICATION AND DOSAGE:     If you are taking an injectable medication,  are you experiencing any of the following symptoms:  Bloating:  NO  Nausea: NO  Vomiting:  NO  Constipation:  NO  Diarrhea: NO  SPECIFY INJECTABLE MEDICATION AND CURRENT DOSAGE: Ozempic  2mg  Pt asking for refills     Vitals:    Is BP less than 100/60? NO  Is BP greater than 140/90? NO  Is HR greater than 100? NO  **If yes to any of the above, have patient relax and repeat in 5-10 minutes**    Repeat values:    Is BP less than 100/60?  Is BP greater than 140/90?  Is HR greater than 100?  **If values remain outside of ranges above, please consult provider for next steps**

## 2024-11-21 ENCOUNTER — TELEPHONE (OUTPATIENT)
Dept: NEPHROLOGY | Facility: CLINIC | Age: 49
End: 2024-11-21

## 2024-11-21 NOTE — TELEPHONE ENCOUNTER
LVM with next appt - 6/2/25 at 12:00pm with Dr. Melendez in the [facility]. Mailed appt reminder card to home. (recall list)

## 2024-12-09 ENCOUNTER — COMPLETE EYE EXAM (OUTPATIENT)
Dept: URBAN - METROPOLITAN AREA CLINIC 6 | Facility: CLINIC | Age: 49
End: 2024-12-09

## 2024-12-09 DIAGNOSIS — H52.4: ICD-10-CM

## 2024-12-09 DIAGNOSIS — Z01.00: ICD-10-CM

## 2024-12-09 PROCEDURE — 92015 DETERMINE REFRACTIVE STATE: CPT

## 2024-12-09 PROCEDURE — 92014 COMPRE OPH EXAM EST PT 1/>: CPT

## 2024-12-09 ASSESSMENT — TONOMETRY
OS_IOP_MMHG: 19
OD_IOP_MMHG: 18

## 2024-12-09 ASSESSMENT — VISUAL ACUITY
OS_CC: 20/25
OD_CC: 20/25
OU_CC: J1+

## 2024-12-25 DIAGNOSIS — E87.1 HYPONATREMIA: ICD-10-CM

## 2024-12-25 DIAGNOSIS — N20.0 NEPHROLITHIASIS: ICD-10-CM

## 2024-12-25 DIAGNOSIS — N13.2 HYDRONEPHROSIS WITH RENAL AND URETERAL CALCULUS OBSTRUCTION: ICD-10-CM

## 2024-12-25 DIAGNOSIS — N13.30 HYDRONEPHROSIS OF LEFT KIDNEY: ICD-10-CM

## 2024-12-26 RX ORDER — HYDROCHLOROTHIAZIDE 25 MG/1
25 TABLET ORAL DAILY
Qty: 90 TABLET | Refills: 1 | Status: SHIPPED | OUTPATIENT
Start: 2024-12-26

## 2025-01-27 ENCOUNTER — ANNUAL EXAM (OUTPATIENT)
Dept: OBGYN CLINIC | Facility: CLINIC | Age: 50
End: 2025-01-27
Payer: COMMERCIAL

## 2025-01-27 ENCOUNTER — PREP FOR PROCEDURE (OUTPATIENT)
Age: 50
End: 2025-01-27

## 2025-01-27 ENCOUNTER — TELEPHONE (OUTPATIENT)
Age: 50
End: 2025-01-27

## 2025-01-27 VITALS
BODY MASS INDEX: 38.64 KG/M2 | SYSTOLIC BLOOD PRESSURE: 116 MMHG | WEIGHT: 210 LBS | HEIGHT: 62 IN | DIASTOLIC BLOOD PRESSURE: 68 MMHG

## 2025-01-27 DIAGNOSIS — Z12.31 ENCOUNTER FOR SCREENING MAMMOGRAM FOR BREAST CANCER: ICD-10-CM

## 2025-01-27 DIAGNOSIS — Z12.11 COLON CANCER SCREENING: ICD-10-CM

## 2025-01-27 DIAGNOSIS — Z01.411 ENCOUNTER FOR GYNECOLOGICAL EXAMINATION WITH ABNORMAL FINDING: Primary | ICD-10-CM

## 2025-01-27 DIAGNOSIS — Z12.11 SCREENING FOR COLON CANCER: Primary | ICD-10-CM

## 2025-01-27 PROCEDURE — 99459 PELVIC EXAMINATION: CPT | Performed by: NURSE PRACTITIONER

## 2025-01-27 PROCEDURE — S0612 ANNUAL GYNECOLOGICAL EXAMINA: HCPCS | Performed by: NURSE PRACTITIONER

## 2025-01-27 NOTE — PROGRESS NOTES
Assessment / Plan    Assessment & Plan  Encounter for gynecological examination with abnormal finding  Normal well woman exam.  1/2024 pap/hpv negative.  Plan repeat 2029.       Encounter for screening mammogram for breast cancer  Order placed for this year.    Orders:    Mammo screening bilateral w 3d and cad; Future    Colon cancer screening    Orders:    Ambulatory Referral to Gastroenterology; Future        Subjective      Sherice Rubio is a 49 y.o. female who presents for her annual gynecologic exam.    Has been seeing bariatric provider, using semaglutide.  Interested in colon cancer screening.    1/2024 pap/hpv negative  Pap hx NL  STD hx none  Sexually active, spouse  9/2024 Mammo negative     BC method: tubal sterilization  Body mass index is 38.41 kg/m².; given guidelines  Calcium intake: taking supplement; given guidelines  Exercise: no; given guidelines  Safety: feels safe     Periods are regular   Current contraception: tubal ligation  History of abnormal Pap smear: no  Family history of breast,uterine, ovarian or colon cancer: yes - pat aunt endometrial ca      The following portions of the patient's history were reviewed and updated as appropriate: allergies, current medications, past family history, past medical history, past social history, past surgical history, and problem list.    Review of Systems      Review of Systems   Constitutional:  Negative for chills and fever.   Respiratory:  Negative for cough and shortness of breath.    Gastrointestinal:  Negative for abdominal distention, abdominal pain, blood in stool, constipation, diarrhea, nausea and vomiting.   Genitourinary:  Negative for difficulty urinating, dysuria, frequency, genital sores, hematuria, menstrual problem, pelvic pain, urgency, vaginal bleeding and vaginal discharge.   Musculoskeletal:  Negative for arthralgias and myalgias.     Breasts:  Negative for skin changes, dimpling, asymmetry, nipple discharge, redness,  "tenderness or palpable masses    Objective     *patient agrees to exam without a chaperone present.     /68 (BP Location: Left arm, Patient Position: Sitting, Cuff Size: Large)   Ht 5' 2\" (1.575 m)   Wt 95.3 kg (210 lb)   LMP 12/26/2024 (Within Days)   BMI 38.41 kg/m²   Physical Exam  Constitutional:       General: She is not in acute distress.     Appearance: Normal appearance. She is well-developed. She is not ill-appearing or diaphoretic.      Comments: Body mass index is 38.41 kg/m².     HENT:      Head: Normocephalic and atraumatic.   Eyes:      Pupils: Pupils are equal, round, and reactive to light.   Neck:      Thyroid: No thyromegaly.   Pulmonary:      Effort: Pulmonary effort is normal.   Chest:   Breasts:     Breasts are symmetrical.      Right: No inverted nipple, mass, nipple discharge, skin change or tenderness.      Left: No inverted nipple, mass, nipple discharge, skin change or tenderness.   Abdominal:      General: There is no distension.      Palpations: Abdomen is soft. There is no mass.      Tenderness: There is no abdominal tenderness. There is no guarding or rebound.   Genitourinary:     General: Normal vulva.      Exam position: Lithotomy position.      Labia:         Right: No rash, tenderness, lesion or injury.         Left: No rash, tenderness, lesion or injury.       Vagina: No signs of injury and foreign body. No vaginal discharge, erythema, tenderness or bleeding.      Cervix: No cervical motion tenderness, discharge or friability.      Uterus: Not enlarged and not tender.       Adnexa:         Right: No mass or tenderness.          Left: No mass or tenderness.        Rectum: Normal.   Musculoskeletal:      Cervical back: Neck supple.   Lymphadenopathy:      Cervical: No cervical adenopathy.      Upper Body:      Right upper body: No supraclavicular adenopathy.      Left upper body: No supraclavicular adenopathy.   Skin:     General: Skin is warm and dry.   Neurological:      " General: No focal deficit present.      Mental Status: She is alert and oriented to person, place, and time.   Psychiatric:         Mood and Affect: Mood normal.         Behavior: Behavior normal.         Thought Content: Thought content normal.         Judgment: Judgment normal.

## 2025-01-27 NOTE — TELEPHONE ENCOUNTER
Scheduled date of colonoscopy (as of today): 6/10/2025  Physician performing colonoscopy:   Location of colonoscopy: [facility]  Bowel prep reviewed with patient: Marcy Torres  Instructions reviewed with patient by: Marcy Torres  Clearances:  N/A        Rajeev dul prep sent via N[employer]

## 2025-01-27 NOTE — LETTER
Sherice Rubio  [address]  [place] [address]                          ------------------------------------------------------------------------------------------------------------

## 2025-01-27 NOTE — TELEPHONE ENCOUNTER
01/27/25  Screened by: Marcy Torres    Referring Provider ZAID Arriaga    Pre- Screening:     There is no height or weight on file to calculate BMI.  Has patient been referred for a routine screening Colonoscopy? yes  Is the patient between 45-75 years old? yes      Previous Colonoscopy no   If yes:    Date: N/A    Facility: N/A    Reason: N/A          Does the patient want to see a Gastroenterologist prior to their procedure OR are they having any GI symptoms? no    Has the patient been hospitalized or had abdominal surgery in the past 6 months? no    Does the patient use supplemental oxygen? no    Does the patient take Coumadin, Lovenox, Plavix, Elliquis, Xarelto, or other blood thinning medication? no    Has the patient had a stroke, cardiac event, or stent placed in the past year? no        If patient is between 45yrs - 49yrs, please advise patient that we will have to confirm benefits & coverage with their insurance company for a routine screening colonoscopy.

## 2025-01-27 NOTE — PATIENT INSTRUCTIONS
"Patient Education     Diet and health   The Basics   Written by the doctors and editors at [facility]   Why is it important to eat a healthy diet? -- It's important to eat a healthy diet because eating the right foods can keep you healthy now and later in life. It can lower the risk of problems like heart disease, diabetes, high blood pressure, and some types of cancer. It can also help you live longer and improve your quality of life.  What kind of diet is best? -- There is no 1 specific diet that experts recommend for everyone. People choose what foods to eat for many different reasons. These include personal preference, culture, Caodaism, allergies or intolerances, and nutritional goals. People also need to consider the cost and availability of different foods.  In general, experts recommend a diet that:   Includes lots of vegetables, fruits, beans, nuts, and whole grains   Limits red and processed meats, unhealthy fats, sugar, salt, and alcohol  What are dietary patterns? -- A dietary \"pattern\" means generally eating certain types of foods while limiting others. Some people need to follow a specific dietary pattern because of their health needs. For example, if you have high blood pressure, your doctor might recommend a diet low in salt.  If you are trying to improve your health in general, choosing a healthy dietary pattern can help. This does not have to mean being very strict about what you eat or avoid. The goal is to think about getting plenty of healthy foods while limiting less healthy foods.  Examples of dietary patterns include:   Mediterranean diet - This involves eating a lot of fruits, vegetables, nuts, and whole grains, and uses olive oil instead of other fats. It also includes some fish, poultry, and dairy products, but not a lot of red meat. Following this diet can help your overall health, and might even lower your risk of having a stroke.   Plant-based diets - These patterns focus on vegetables, " "fruits, grains, beans, and nuts. They limit or avoid food that comes from animals, such as meat and dairy. There are different types of plant-based diets, including vegetarian and vegan.   Low-fat diet - A low-fat diet involves limiting calories from fat. This might help some people keep weight off if that is their goal, but it does not have many other health benefits. If you choose to follow a low-fat diet, it is also important to focus on getting lots of whole grains, legumes, fruits, and vegetables. Limit refined grains and sugar.   Low-cholesterol diet - Cholesterol is found in foods with a lot of saturated fat, like red meat, butter, and cheese. A low-cholesterol diet focuses on limiting the amount of cholesterol that you eat. Limiting the cholesterol in your diet can also help lower the amount of unhealthy fats that you eat.  Which foods are especially healthy? -- Foods that are especially healthy include:   Fruits and vegetables - Eating a diet with lots of fruits and vegetables can help prevent heart disease and stroke. It might also help prevent certain types of cancer. Try to eat fruits and vegetables at each meal and also for snacks. If you don't have fresh fruits and vegetables available, you can eat frozen or canned ones instead. Doctors recommend eating at least 5 servings of fruits or vegetables each day.   Whole grains - Whole-grain foods include 100 percent whole-wheat bread, steel cut oats, and whole-grain pasta. These are healthier than foods made with \"refined\" grains, like white bread and white rice. Eating lots of whole grains instead of refined grains has been shown to help with weight control. It can also lower the risk of several health problems, including colon cancer, heart disease, and diabetes. Doctors recommend that most people try to eat 5 to 8 servings of whole-grain, high-fiber foods each day.   Foods with fiber - Eating foods with a lot of fiber can help prevent heart disease and " "stroke. If you have type 2 diabetes, it can also help control your blood sugar. Foods that have a lot of fiber include vegetables, fruits, beans, nuts, oatmeal, and whole-grain breads and cereals. You can tell how much fiber is in a food by reading the nutrition label (figure 1). Doctors recommend that most people eat about 25 to 34 grams of fiber each day.   Foods with calcium and vitamin D - Babies, children, and adults need calcium and vitamin D to help keep their bones strong. Adults also need calcium and vitamin D to help prevent osteoporosis. Osteoporosis is a condition that causes bones to get \"thin\" and break more easily than usual. Different foods and drinks have calcium and vitamin D in them (figure 2). People who don't get enough calcium and vitamin D in their diet might need to take a supplement. Doctors recommend that most people have 2 to 3 servings of foods with calcium and vitamin D each day.   Foods with protein - Protein helps your muscles and bones stay strong. Healthy foods with a lot of protein include chicken, fish, eggs, beans, nuts, and soy products. Red meat also has a lot of protein, but it also contains fats, which can be unhealthy. Doctors recommend that most people try to eat about 5 servings of protein each day.   Healthy fats - There are different types of fats. Some types of fats are better for your body than others. Healthy fats are \"monounsaturated\" or \"polyunsaturated\" fats. These are found in fatty fish, nuts and nut butters, and avocados. Use plant-based oils when cooking. Examples of these oils include olive, canola, safflower, sunflower, and corn oil. Eating foods with healthy fats, while avoiding or limiting foods with unhealthy fats, might lower the risk of heart disease.   Foods with folate - Folate is a vitamin that is important for pregnant people, since it helps prevent certain birth defects. It is also called \"folic acid.\" Anyone who could get pregnant should get at " "least 400 micrograms of folic acid daily, whether or not they are actively trying to get pregnant. Folate is found in many breakfast cereals, oranges, orange juice, and green leafy vegetables.  What foods should I avoid or limit? -- To eat a healthy diet, there are some things that you should avoid or limit. They include:   Unhealthy fats - \"Trans\" fats are especially unhealthy. They are found in margarines, many fast foods, and some store-bought baked goods. \"Saturated\" fats are found in animal products like meats, egg yolks, butter, cheese, and full-fat milk products. Unhealthy fats can raise your cholesterol level and increase your chance of getting heart disease.   Sugar - To have a healthy diet, it's important to limit or avoid added sugar, sweets, and refined grains. Refined grains are found in white bread, white rice, most pastas, and most packaged \"snack\" foods.  Avoiding sugar-sweetened beverages, like soda and sports drinks, can also help improve your health.  Avoid canned fruits in \"heavy\" syrup.   Red and processed meats - Studies have shown that eating a lot of red meat can increase your risk of certain health problems, including heart disease and cancer. You should limit the amount of red meat that you eat. This is also true for processed meats like sausage, hot dogs, and walker.  Can I drink alcohol as part of a healthy diet? -- Not drinking alcohol at all is the healthiest choice. Regular drinking can raise a person's chances of getting liver disease and certain types of cancers. In females, even 1 drink a day can increase the risk of getting breast cancer.  If you do choose to drink, most doctors recommend limiting alcohol to no more than:   1 drink a day for females   2 drinks a day for males  The limits are different because, generally, the female body takes longer to break down alcohol.  How many calories do I need each day? -- Calories give your body energy. The number of calories that you need " "each day depends on your weight, height, age, sex, and how active you are.  Your doctor or nurse can tell you about how many calories you should eat each day. You can also work with a dietitian (nutrition expert) to learn more about your dietary needs and options.  What if I am having trouble improving my diet? -- It can be hard to change the way that you eat. Remember that even small changes can improve your health.  Here are some tips that might help:   Try to make fruits and vegetables part of every meal. If you don't have fresh fruits and vegetables, frozen or canned are good options. Look for products without added salt or sugar.   Keep a bowl of fruit out for snacking.   When you can, choose whole grains instead of refined grains. Choose chicken, fish, and beans instead of red meat and cheese.   Try to eat prepared and processed foods less often.   Try flavored seltzer or water instead of soda or juice.   When eating at fast food restaurants, look for healthier items, like broiled chicken or salad.  If you have questions about which foods you should or should not eat, ask your doctor, nurse, or dietitian. The right diet for you will depend, in part, on your health and any medical conditions you have.  All topics are updated as new evidence becomes available and our peer review process is complete.  This topic retrieved from [employer] on: Feb 28, 2024.  Topic 64808 Version 28.0  Release: 32.2.4 - C32.58  © 2024 UpToDate, Inc. and/or its affiliates. All rights reserved.  figure 1: Nutrition label - Fiber     This is an example of a nutrition label. To figure out how much fiber is in a food, look for the line that says \"Dietary Fiber.\" It's also important to look at the serving size. This food has 7 grams of fiber in each serving, and each serving is 1 cup.  Graphic 06401 Version 8.0  figure 2: Foods and drinks with calcium and vitamin D     Foods rich in calcium include ice cream, soy milk, breads, kale, " "broccoli, milk, cheese, cottage cheese, almonds, yogurt, ready-to-eat cereals, beans, and tofu. Foods rich in vitamin D include milk, fortified plant-based \"milks\" (soy, almond), canned tuna fish, cod liver oil, yogurt, ready-to-eat-cereals, cooked salmon, canned sardines, mackerel, and eggs. Some of these foods are rich in both.  Graphic 87638 Version 4.0  Consumer Information Use and Disclaimer   Disclaimer: This generalized information is a limited summary of diagnosis, treatment, and/or medication information. It is not meant to be comprehensive and should be used as a tool to help the user understand and/or assess potential diagnostic and treatment options. It does NOT include all information about conditions, treatments, medications, side effects, or risks that may apply to a specific patient. It is not intended to be medical advice or a substitute for the medical advice, diagnosis, or treatment of a health care provider based on the health care provider's examination and assessment of a patient's specific and unique circumstances. Patients must speak with a health care provider for complete information about their health, medical questions, and treatment options, including any risks or benefits regarding use of medications. This information does not endorse any treatments or medications as safe, effective, or approved for treating a specific patient. UpToDate, Inc. and its affiliates disclaim any warranty or liability relating to this information or the use thereof.The use of this information is governed by the Terms of Use, available at https://www.wolterskluwer.com/en/know/clinical-effectiveness-terms. 2024© UpToDate, Inc. and its affiliates and/or licensors. All rights reserved.  Copyright   © 2024 UpToDate, Inc. and/or its affiliates. All rights reserved.  Patient Education     Calcium Rich Diet   About this topic   Calcium is one of the most important minerals and is found in almost all parts of your " body. It makes your teeth and bones strong and healthy. Your body does not make calcium. It is important for you to eat calcium rich foods to get enough calcium. It also helps your body:  Make blood clots  Keep your heartbeat normal  Helps blood move throughout the body  Release hormones  Release enzymes  Send and get signals between your nerves and brain  Make muscles work well         What will the results be?   It is important to have a good amount of calcium in your food. Calcium helps your body work well. It is very important during every life stage. Calcium may also keep you from losing bone mass. This is osteopenia. It may help keep you from having weak bones. This is osteoporosis.  What drugs may be needed?   The doctor may order calcium and vitamin D supplements. You need vitamin D to help your body take in the calcium. Your calcium supplement may have vitamin D in it.  Talk to your doctor about:  If it is OK to take your calcium with food.  How often to take your calcium supplement throughout the day.  All the drugs you are taking. Be sure to include all prescription and over-the-counter (OTC) drugs, and herbal supplements. Tell the doctor about any drug allergy. Bring a list of drugs you take with you. Some drugs may cause problems with how your body takes in calcium.  Will physical activity be limited?   No, physical activities will not be limited. It is good for you to do light exercises and to stay active.  What changes to diet are needed?   You will need to watch how much calcium is in the foods you eat. Your doctor will talk to you about the right amount of calcium for you. How much calcium you need is based on your age and life stage.  When is this diet used?   This diet is used when your normal diet is low in calcium. It is needed to raise the amount of calcium in your diet. Our bodies do not take in calcium well as we get older.  Who should not use this diet?   People with too much calcium in  their blood should not use this diet. This is called hypercalcemia.  What foods are good to eat?   Milk, yogurt, and cheese products are naturally high in calcium.  These foods have smaller amounts of calcium. If they are eaten often and in large amounts they can be good sources of calcium:  Oysters; dried fruit like figs and raisins; green leafy vegetables like broccoli, collards, kale, mustard greens, bok tripp; soy beans; oranges  [place] and sardines. Be sure to eat the soft bones.  Nuts like almonds and [place] nuts, sunflower seeds, tahini, dried beans  Food products with calcium added to them like orange juice, tofu, cereal, bread; almond milk, rice milk, soy milk  What foods should be limited or avoided?   People who eat many kinds of foods do not have to be worried about limiting or avoiding certain foods.   What problems could happen?   Some people may get kidney stones. This may happen after taking high amounts of calcium over a long time. Calcium from food does not seem to cause kidney stones.  Hard stools.  Too much calcium may interfere with your body’s ability to absorb iron and zinc.  When do I need to call the doctor?   Call your doctor if you have concerns about your diet. Tell your doctor if you are allergic to any of the foods in your diet.  Helpful tips   If you have problems digesting milk, try lactose-free milk. You may also use a product to help you take in lactose.  Talk with your doctor if you are a vegetarian and do not eat dairy products. Your doctor can help you make sure you get the amount of calcium your body needs.  Last Reviewed Date   2021-03-12  Consumer Information Use and Disclaimer   This generalized information is a limited summary of diagnosis, treatment, and/or medication information. It is not meant to be comprehensive and should be used as a tool to help the user understand and/or assess potential diagnostic and treatment options. It does NOT include all information about  conditions, treatments, medications, side effects, or risks that may apply to a specific patient. It is not intended to be medical advice or a substitute for the medical advice, diagnosis, or treatment of a health care provider based on the health care provider's examination and assessment of a patient’s specific and unique circumstances. Patients must speak with a health care provider for complete information about their health, medical questions, and treatment options, including any risks or benefits regarding use of medications. This information does not endorse any treatments or medications as safe, effective, or approved for treating a specific patient. UpToDate, Inc. and its affiliates disclaim any warranty or liability relating to this information or the use thereof. The use of this information is governed by the Terms of Use, available at https://www.[employer]spect[employer].com/en/know/clinical-effectiveness-terms   Copyright   Copyright © 2024 UpToDate, Inc. and its affiliates and/or licensors. All rights reserved.  Patient Education     Exercise and movement   The Basics   Written by the doctors and editors at [employer]   What are the benefits of movement? -- Moving your body has many benefits. It can:   Burn calories, which helps people manage their weight   Help control blood sugar levels in people with diabetes   Lower blood pressure, especially in people with high blood pressure   Lower stress, and help with depression and anxiety   Keep bones strong, so they don't get thin and break easily   Lower the chance of dying from heart disease  Adding even small amounts of physical activity to your daily routine can improve your health.  What are the main types of exercise? -- There are 3 main types of exercise:   Aerobic exercise - This raises your heart rate. Examples include walking, running, dancing, riding a bike, and swimming.   Muscle strengthening - This helps make your muscles stronger. You can do it using  weights, exercise bands, or weight machines. You can also use your own body weight, as with push-ups, or by lifting items in your home, like jugs of water.   Stretching - These help your muscles and joints move more easily.  It's important to have all 3 types in your exercise program. That way, your body, muscles, and joints can be as healthy as possible.  Should I talk to my doctor or nurse before I start exercising? -- If you have not exercised before or have not exercised in a long time, talk with your doctor or nurse before you start a very active exercise program.  If you have heart disease or risk factors for heart disease (like high blood pressure or diabetes), your doctor or nurse might recommend that you have an exercise test before starting an exercise program.  When you begin an exercise program, start slowly. For example, do the exercise at a slow pace or for a few minutes only. Over time, you can exercise faster and for longer periods of time.  What should I do when I exercise? -- Each time you exercise, you should:   Warm up - This can help keep you from hurting your muscles when you exercise. To warm up, do a light aerobic exercise (such as walking slowly) or stretch for 5 to 10 minutes.   Work out - Try to get a mix of aerobic exercise, muscle strengthening, and stretching. During an aerobic workout, you can walk fast, swim, run, or use an exercise machine, for example. Other activities, like dancing or playing tennis, are also forms of aerobic exercise. You should also take time to stretch all of your joints, including your neck, shoulders, back, hips, and knees. At least 2 times a week, you can do muscle strengthening exercises as part of your workout.   Cool down - This helps keep you from feeling dizzy after you exercise and helps prevent muscle cramps. To cool down, you can stretch or do a light aerobic exercise for 5 minutes.  Some people go to a gym or do group exercise classes. But you can  exercise even without these things. Some exercises can be done even in a small space. You can also try online videos or smartphone apps to get ideas for different types of exercise.  How often should I exercise? -- Doctors recommend that people exercise at least 30 minutes a day, on 5 or more days of the week.  If you can't exercise for 30 minutes straight, try to exercise for 10 minutes at a time, 3 or 4 times a day. Even exercising for shorter amounts of time is good for you, especially if it means spending less time sitting.  When should I call my doctor or nurse? -- If you have any of the following symptoms when you exercise, stop exercising and call your doctor or nurse right away:   Pain or pressure in your chest, arms, throat, jaw, or back   Nausea or vomiting   Feeling like your heart is fluttering or racing very fast   Feeling dizzy or faint  What if I don't have time to exercise? -- Many people have very busy lives and might not think that they have time to exercise. But it's important to try to find time, even if you are tired or work a lot. Exercise can increase your energy level, which can make you feel better and might even help you get more work done.  Even if it's hard to set aside a lot of time to exercise, you can still improve your health by moving your body more. There are many ways that you can be more active. For example, you can:   Take the stairs instead of the elevator.   Park in a parking space that is farther away from the door.   Take a longer route when you walk from one place to another.  Spending a lot of time sitting still (for example, watching TV or working on the computer) is bad for your health. Try to get up and move around whenever you can. Even small amounts of movement, like taking short walks, doing household chores, or gardening, can improve your health. Finding activities that you enjoy, or doing them with other people, can help you add more movement into your daily  life.  What else should I do when I exercise? -- To exercise safely and avoid problems, it's important to:   Drink fluids during and after exercising (but avoid drinks with a lot of caffeine or sugar).   Avoid exercising outside if it is too hot or cold.   Wear layers of clothes, so that you can take them off if you get too hot.   Wear shoes that fit well and support your feet.   Be aware of your surroundings if you exercise outside.  All topics are updated as new evidence becomes available and our peer review process is complete.  This topic retrieved from [email or web address] on: May 18, 2024.  Topic 82612 Version 31.0  Release: 32.4.3 - C32.137  © 2024 UpToDate, Inc. and/or its affiliates. All rights reserved.  Consumer Information Use and Disclaimer   Disclaimer: This generalized information is a limited summary of diagnosis, treatment, and/or medication information. It is not meant to be comprehensive and should be used as a tool to help the user understand and/or assess potential diagnostic and treatment options. It does NOT include all information about conditions, treatments, medications, side effects, or risks that may apply to a specific patient. It is not intended to be medical advice or a substitute for the medical advice, diagnosis, or treatment of a health care provider based on the health care provider's examination and assessment of a patient's specific and unique circumstances. Patients must speak with a health care provider for complete information about their health, medical questions, and treatment options, including any risks or benefits regarding use of medications. This information does not endorse any treatments or medications as safe, effective, or approved for treating a specific patient. UpToDate, Inc. and its affiliates disclaim any warranty or liability relating to this information or the use thereof.The use of this information is governed by the Terms of Use, available at  https://www.[facility] Centersuwer.com/en/know/clinical-effectiveness-terms. 2024© [employer] and its affiliates and/or licensors. All rights reserved.  Copyright   © 2024 [employer] and/or its affiliates. All rights reserved.

## 2025-02-25 NOTE — PROGRESS NOTES
Assessment & Plan  Class 2 obesity    Type 2 diabetes mellitus without complication, without long-term current use of insulin (HCC)    Lab Results   Component Value Date    HGBA1C 6.2 (H) 08/11/2024          Initial weight: 238.5 lbs. 2/2022  Current weight: 209    TBW loss%: 12.3    Medication: Ozempic 2mg     Dietary modifications:    Recommend to avoid skipping any meals. Meal frequency (3 meals 2-3 snacks in between) can potentially improve metabolism and allow for better portion control by decreasing Ghrelin (hunger hormone)     Mindful Eating and Drinking is necessary to promote healthy behaviors that can lead to decreased adipose tissue which can be curative and preventative for comorbidities such as hypertension, diabetes, anxiety, depression, osteoarthritis, dyslipidemia, asthma, liver disease, cardiovascular disease, stroke, sleep apnea and cancers.       Macronutrients: (Nutrients that the body needs for energy and support vital functions)    Protein is necessary to promote satiety, metabolism, and muscle growth/repair. Increased energy expenditure is used for the processes of breaking down and rebuilding proteins within the body   Carbohydrates are essential as it is the body's main fuel source for daily activities.  Recommend that carbohydrates be consumed mostly during the times you are more active rather than high amounts before bedtime  Fats: Essential vitamins like A, D, and E, protect your organs, support cell growth and contribute to maintaining healthy cholesterol levels      Fluid intake which is at least half your body weight in ounces is necessary to help control cravings (decreasing confusion for appetite vs water deprivation) as the human body is made up of 50-70% of Fluids. If there is a diversion for water alone, would recommend flavored water (example-splash of lemonade or ice tea) to help promote compliance. Fluids include Teas, water, flavored water, seltzer water, coffee,  shakes    Metabolism:    Metabolism can also be promoted by meal frequency, protein intake and increased muscle mass     Daily Calorie Needs: are individualized and will need to take into account any fluid losses, increased activity levels which may need to be adjusted daily. Recommended to not skip any meals even if there is a lack of appetite as it can be suppressed by caffeine or any prior heavy meal due to Leptin (satiety hormone).  Calorie and fluid intake will need to be increased if there is any increased activity during the day compared to previous days.  With proper fluid and macronutrient intake throughout the day, portion control and decreased cravings will improve     Weight check: BMI and weight serve as only guidelines as it is not factor in muscle mass, fat, water. Weights can fluctuate depending on fluid shifts vs what foods are consumed prior to checking your weight      Return in about 3 months (around 5/26/2025) for followup .     Most recent notes, labs and previous medical records were reviewed.       ______________________________________________________________________        Subjective:     Chief Complaint   Patient presents with    Follow-up     Mwm f/u 6m: waist: 45in.       HPI:  49-year-old female past medical history of type 2 diabetes, hypertension, GERD presents to the clinic for followup     Current Medication: Ozempic 2 mg           Dietary Regimen:  Breakfast: Eggs, sometimes a skip              Lunch: Pizza   Dinner: Chicken wrap   Snacks: Popocron smart , chips   Fluids: 6- 8 cups a bottle a day, tea     Occupation: Nurse [employer]       Review Of Systems:  General: No pallor, no weakness   Pulmonary: Negative for shortness of breath  Chest: negative for chest pain  Gastrointestinal:  Negative for abdominal pain, diarrhea   Psychiatric/Behavioral:  Negative for behavioral problems, confusion, dysphoric mood and hallucinations.    All other systems reviewed and are  "negative.     Objective:  /74 (Patient Position: Sitting)   Pulse 91   Temp 97.8 °F (36.6 °C)   Resp 16   Ht 5' 2\" (1.575 m)   Wt 94.9 kg (209 lb 3.2 oz)   LMP 02/05/2025 (Within Days)   BMI 38.26 kg/m²     Wt Readings from Last 30 Encounters:   02/26/25 94.9 kg (209 lb 3.2 oz)   01/27/25 95.3 kg (210 lb)   10/21/24 93.4 kg (206 lb)   09/16/24 93 kg (205 lb)   07/22/24 93.4 kg (206 lb)   05/22/24 93.9 kg (207 lb)   05/20/24 93.5 kg (206 lb 3.2 oz)   03/20/24 91.8 kg (202 lb 6.4 oz)   01/26/24 92.6 kg (204 lb 3.2 oz)   11/08/23 90.4 kg (199 lb 6.4 oz)   07/06/23 90.5 kg (199 lb 9.6 oz)   05/31/23 89.4 kg (197 lb)   03/22/23 91.6 kg (202 lb)   03/15/23 91.1 kg (200 lb 12.8 oz)   01/23/23 92.5 kg (204 lb)   01/12/23 92.7 kg (204 lb 6.4 oz)   10/26/22 96.5 kg (212 lb 11.2 oz)   09/22/22 96 kg (211 lb 11.2 oz)   08/31/22 97.1 kg (214 lb 1.6 oz)   07/14/22 99.8 kg (220 lb 1.6 oz)   06/17/22 101 kg (221 lb 9.6 oz)   06/02/22 102 kg (225 lb 3.2 oz)   05/20/22 102 kg (224 lb 12.8 oz)   04/27/22 105 kg (231 lb 11.2 oz)   04/06/22 104 kg (228 lb 15.2 oz)   03/31/22 108 kg (237 lb 3.2 oz)   03/17/22 107 kg (235 lb)   03/16/22 106 kg (233 lb 6.4 oz)   03/10/22 107 kg (235 lb)   03/03/22 107 kg (235 lb 9.6 oz)       Physical Exam  Constitutional:       General: No acute distress.  Well-nourished  HENT:      Head: Normocephalic and atraumatic.   Eyes:      Extraocular Movements: Extraocular movements intact.      Conjunctiva/pupils: Conjunctivae normal. Pupils are equal, round  Pulmonary:      Effort: Pulmonary effort is normal. No labored breathing   Neurological:      General: No focal deficit present.  AO x 3     Mental Status: Alert and oriented to person, place, and time. Mental status is at baseline.   Psychiatric:         Mood and Affect: Mood normal.         Behavior: Behavior normal.     Labs and Imaging  Recent labs and imaging have been personally reviewed.  "

## 2025-02-26 ENCOUNTER — OFFICE VISIT (OUTPATIENT)
Dept: BARIATRICS | Facility: CLINIC | Age: 50
End: 2025-02-26
Payer: COMMERCIAL

## 2025-02-26 VITALS
BODY MASS INDEX: 38.5 KG/M2 | RESPIRATION RATE: 16 BRPM | HEIGHT: 62 IN | DIASTOLIC BLOOD PRESSURE: 74 MMHG | SYSTOLIC BLOOD PRESSURE: 124 MMHG | WEIGHT: 209.2 LBS | TEMPERATURE: 97.8 F | HEART RATE: 91 BPM

## 2025-02-26 DIAGNOSIS — E66.812 CLASS 2 OBESITY WITH BODY MASS INDEX (BMI) OF 39.0 TO 39.9 IN ADULT: Primary | ICD-10-CM

## 2025-02-26 DIAGNOSIS — E11.9 TYPE 2 DIABETES MELLITUS WITHOUT COMPLICATION, WITHOUT LONG-TERM CURRENT USE OF INSULIN (HCC): ICD-10-CM

## 2025-02-26 PROCEDURE — 99214 OFFICE O/P EST MOD 30 MIN: CPT | Performed by: STUDENT IN AN ORGANIZED HEALTH CARE EDUCATION/TRAINING PROGRAM

## 2025-05-19 ENCOUNTER — PATIENT MESSAGE (OUTPATIENT)
Dept: GASTROENTEROLOGY | Facility: CLINIC | Age: 50
End: 2025-05-19

## 2025-05-22 ENCOUNTER — TELEPHONE (OUTPATIENT)
Dept: NEPHROLOGY | Facility: CLINIC | Age: 50
End: 2025-05-22

## 2025-05-22 DIAGNOSIS — N13.30 HYDRONEPHROSIS OF LEFT KIDNEY: ICD-10-CM

## 2025-05-22 DIAGNOSIS — N20.0 NEPHROLITHIASIS: Primary | ICD-10-CM

## 2025-05-22 DIAGNOSIS — N13.2 HYDRONEPHROSIS WITH RENAL AND URETERAL CALCULUS OBSTRUCTION: ICD-10-CM

## 2025-05-23 ENCOUNTER — TELEPHONE (OUTPATIENT)
Age: 50
End: 2025-05-23

## 2025-05-23 NOTE — TELEPHONE ENCOUNTER
Patient went for her labs today, they are . Can u please change the date and she will get the labs done.

## 2025-05-27 ENCOUNTER — APPOINTMENT (OUTPATIENT)
Dept: LAB | Facility: CLINIC | Age: 50
End: 2025-05-27
Attending: INTERNAL MEDICINE
Payer: COMMERCIAL

## 2025-05-27 ENCOUNTER — TELEPHONE (OUTPATIENT)
Age: 50
End: 2025-05-27

## 2025-05-27 DIAGNOSIS — N13.2 HYDRONEPHROSIS WITH RENAL AND URETERAL CALCULUS OBSTRUCTION: ICD-10-CM

## 2025-05-27 DIAGNOSIS — N13.30 HYDRONEPHROSIS OF LEFT KIDNEY: ICD-10-CM

## 2025-05-27 DIAGNOSIS — N20.0 NEPHROLITHIASIS: ICD-10-CM

## 2025-05-27 DIAGNOSIS — N20.0 NEPHROLITHIASIS: Primary | ICD-10-CM

## 2025-05-27 LAB
ALBUMIN SERPL BCG-MCNC: 3.9 G/DL (ref 3.5–5)
ALP SERPL-CCNC: 55 U/L (ref 34–104)
ALT SERPL W P-5'-P-CCNC: 14 U/L (ref 7–52)
ANION GAP SERPL CALCULATED.3IONS-SCNC: 9 MMOL/L (ref 4–13)
AST SERPL W P-5'-P-CCNC: 16 U/L (ref 13–39)
BACTERIA UR QL AUTO: ABNORMAL /HPF
BILIRUB SERPL-MCNC: 0.31 MG/DL (ref 0.2–1)
BILIRUB UR QL STRIP: NEGATIVE
BUN SERPL-MCNC: 15 MG/DL (ref 5–25)
CALCIUM SERPL-MCNC: 9.2 MG/DL (ref 8.4–10.2)
CHLORIDE SERPL-SCNC: 102 MMOL/L (ref 96–108)
CLARITY UR: ABNORMAL
CO2 SERPL-SCNC: 28 MMOL/L (ref 21–32)
COLOR UR: ABNORMAL
CREAT SERPL-MCNC: 0.78 MG/DL (ref 0.6–1.3)
GFR SERPL CREATININE-BSD FRML MDRD: 90 ML/MIN/1.73SQ M
GLUCOSE P FAST SERPL-MCNC: 108 MG/DL (ref 65–99)
GLUCOSE UR STRIP-MCNC: NEGATIVE MG/DL
HGB UR QL STRIP.AUTO: ABNORMAL
KETONES UR STRIP-MCNC: NEGATIVE MG/DL
LEUKOCYTE ESTERASE UR QL STRIP: ABNORMAL
MAGNESIUM SERPL-MCNC: 1.7 MG/DL (ref 1.9–2.7)
NITRITE UR QL STRIP: NEGATIVE
NON-SQ EPI CELLS URNS QL MICRO: ABNORMAL /HPF
PH UR STRIP.AUTO: 6.5 [PH]
PHOSPHATE SERPL-MCNC: 3.9 MG/DL (ref 2.7–4.5)
POTASSIUM SERPL-SCNC: 3.9 MMOL/L (ref 3.5–5.3)
PROT SERPL-MCNC: 6.6 G/DL (ref 6.4–8.4)
PROT UR STRIP-MCNC: ABNORMAL MG/DL
RBC #/AREA URNS AUTO: ABNORMAL /HPF
SODIUM SERPL-SCNC: 139 MMOL/L (ref 135–147)
SP GR UR STRIP.AUTO: 1.02 (ref 1–1.03)
TRANS CELLS #/AREA URNS HPF: PRESENT /[HPF]
URATE SERPL-MCNC: 4.3 MG/DL (ref 2–7.5)
UROBILINOGEN UR STRIP-ACNC: <2 MG/DL
WBC #/AREA URNS AUTO: ABNORMAL /HPF

## 2025-05-27 PROCEDURE — 83935 ASSAY OF URINE OSMOLALITY: CPT

## 2025-05-27 PROCEDURE — 83735 ASSAY OF MAGNESIUM: CPT

## 2025-05-27 PROCEDURE — 84105 ASSAY OF URINE PHOSPHORUS: CPT

## 2025-05-27 PROCEDURE — 82131 AMINO ACIDS SINGLE QUANT: CPT

## 2025-05-27 PROCEDURE — 84392 ASSAY OF URINE SULFATE: CPT

## 2025-05-27 PROCEDURE — 84560 ASSAY OF URINE/URIC ACID: CPT

## 2025-05-27 PROCEDURE — 82436 ASSAY OF URINE CHLORIDE: CPT

## 2025-05-27 PROCEDURE — 84100 ASSAY OF PHOSPHORUS: CPT

## 2025-05-27 PROCEDURE — 84133 ASSAY OF URINE POTASSIUM: CPT

## 2025-05-27 PROCEDURE — 82340 ASSAY OF CALCIUM IN URINE: CPT

## 2025-05-27 PROCEDURE — 83945 ASSAY OF OXALATE: CPT

## 2025-05-27 PROCEDURE — 81001 URINALYSIS AUTO W/SCOPE: CPT

## 2025-05-27 PROCEDURE — 84300 ASSAY OF URINE SODIUM: CPT

## 2025-05-27 PROCEDURE — 80053 COMPREHEN METABOLIC PANEL: CPT

## 2025-05-27 PROCEDURE — 81003 URINALYSIS AUTO W/O SCOPE: CPT

## 2025-05-27 PROCEDURE — 82570 ASSAY OF URINE CREATININE: CPT

## 2025-05-27 PROCEDURE — 82507 ASSAY OF CITRATE: CPT

## 2025-05-27 PROCEDURE — 36415 COLL VENOUS BLD VENIPUNCTURE: CPT

## 2025-05-27 PROCEDURE — 84550 ASSAY OF BLOOD/URIC ACID: CPT

## 2025-05-27 PROCEDURE — 82140 ASSAY OF AMMONIA: CPT

## 2025-05-27 NOTE — TELEPHONE ENCOUNTER
Patient called she went for her labs and urine this morning and the order for her 24 urine .   Please update in epic the lab will keep looking for it.

## 2025-05-28 ENCOUNTER — OFFICE VISIT (OUTPATIENT)
Dept: BARIATRICS | Facility: CLINIC | Age: 50
End: 2025-05-28
Payer: COMMERCIAL

## 2025-05-28 VITALS
HEART RATE: 82 BPM | BODY MASS INDEX: 38.9 KG/M2 | DIASTOLIC BLOOD PRESSURE: 76 MMHG | WEIGHT: 211.4 LBS | TEMPERATURE: 97.8 F | HEIGHT: 62 IN | SYSTOLIC BLOOD PRESSURE: 120 MMHG | RESPIRATION RATE: 16 BRPM

## 2025-05-28 DIAGNOSIS — E66.812 CLASS 2 OBESITY WITH BODY MASS INDEX (BMI) OF 39.0 TO 39.9 IN ADULT: ICD-10-CM

## 2025-05-28 DIAGNOSIS — E11.9 TYPE 2 DIABETES MELLITUS WITHOUT COMPLICATION, WITHOUT LONG-TERM CURRENT USE OF INSULIN (HCC): Primary | ICD-10-CM

## 2025-05-28 PROCEDURE — 99214 OFFICE O/P EST MOD 30 MIN: CPT | Performed by: STUDENT IN AN ORGANIZED HEALTH CARE EDUCATION/TRAINING PROGRAM

## 2025-05-28 RX ORDER — TIRZEPATIDE 2.5 MG/.5ML
2.5 INJECTION, SOLUTION SUBCUTANEOUS WEEKLY
Qty: 2 ML | Refills: 0 | Status: SHIPPED | OUTPATIENT
Start: 2025-05-28

## 2025-05-28 NOTE — PROGRESS NOTES
Assessment & Plan  Type 2 diabetes mellitus    Lab Results   Component Value Date    HGBA1C 6.2 (H) 08/11/2024     Class 2 obesity    Initial weight: 238.5 lbs. 2/2022  Previous weight: 209   Current weight: 211    TBW loss%: 12     Medication: Ozempic 2 mg.     Patient is plateauing with ozempic     Will switch to Mounjaro 2.5, for weight loss and glycemic control      Patient has tried to maintain healthy dietary changes for greater than 1 year. Tried ozempic but reports that she is currently plateauing    Patient has a BMI greater than 30 associated with co-morbidities such as type 2 diabetes, hypertension, GERD    Patient understands the importance of making lifestyle changes as recommended below to aid in weight loss.      Dietary Recommendations:  Recommend to avoid skipping any meals. Adequate amount of Macronutrients (minimal 3 meals a day) is necessary to help improve metabolism, satiety and allow for better portion control by decreasing Ghrelin (hunger hormone). Lack of hunger can be suppressed by a hormone called Leptin (full hormone) which can occur from a previous meal or caffeine intake    Protein intake throughout the day can help promote satiety and is necessary for muscle growth/repair    Carbohydrates are essential as it is the vital source of fuel for daily activities. energy, cell function, nutrient absorption, and hormone production.     Fats: Essential vitamins like A, D, and E, support cell growth, function and are necessary for nutrient absorption to support your organs     Fluid intake which is at least half your body weight in ounces is necessary to help control cravings (decreasing confusion for appetite vs water deprivation) as the human body is made up of 50-70% of Fluids. If there is a diversion for water alone, would recommend flavored water (example-splash of lemonade or ice tea) to help promote compliance. Fluids include Teas, water, flavored water, seltzer water, coffee,  "shakes    Metabolism:    Metabolism can also be promoted by macronutrient intake and increased muscle weight via thermogenesis      Daily Calorie Needs: Recommend to take into account any fluid losses and calories burned via increased activity levels as daily calories may need to be adjusted     Weight check: Weights can fluctuate depending on fluid shifts vs what foods are consumed prior to checking your weight          Return in about 3 months (around 9/8/2025) for followup .     Most recent notes, labs and previous medical records were reviewed. Total time with chart review and with the patient: 35 min      ______________________________________________________________________        Subjective:     Chief Complaint   Patient presents with    Follow-up     Mwm 3mf/u: waist: 46in.       HPI: 50 y.o. female with pmh of  history of type 2 diabetes, hypertension, GERD presents to the clinic for followup presents for follow-up.  Patient reports that she is currently plateauing with Ozempic    Wt Loss History:   Current Medication: ozempic 2mg       Dietary Regimen:  Breakfast: Eggs, protein shakes         Lunch: Takeout sometimes   Dinner: Chicken wrap   Fluids: 6- 8 cups a bottle a day     Occupation: Nurse surgery department  [employer]     Review Of Systems:  General: No pallor  Pulmonary: Negative for shortness of breath  Chest: negative for chest pain  Gastrointestinal:  Negative for vomiting   Psychiatric/Behavioral:  Negative for behavioral problems, confusion, dysphoric mood and hallucinations.    All other systems reviewed and are negative.     Objective:  /76   Pulse 82   Temp 97.8 °F (36.6 °C)   Resp 16   Ht 5' 2\" (1.575 m)   Wt 95.9 kg (211 lb 6.4 oz)   BMI 38.67 kg/m²     Wt Readings from Last 30 Encounters:   05/28/25 95.9 kg (211 lb 6.4 oz)   02/26/25 94.9 kg (209 lb 3.2 oz)   01/27/25 95.3 kg (210 lb)   10/21/24 93.4 kg (206 lb)   09/16/24 93 kg (205 lb)   07/22/24 93.4 kg (206 lb)   05/22/24 " 93.9 kg (207 lb)   05/20/24 93.5 kg (206 lb 3.2 oz)   03/20/24 91.8 kg (202 lb 6.4 oz)   01/26/24 92.6 kg (204 lb 3.2 oz)   11/08/23 90.4 kg (199 lb 6.4 oz)   07/06/23 90.5 kg (199 lb 9.6 oz)   05/31/23 89.4 kg (197 lb)   03/22/23 91.6 kg (202 lb)   03/15/23 91.1 kg (200 lb 12.8 oz)   01/23/23 92.5 kg (204 lb)   01/12/23 92.7 kg (204 lb 6.4 oz)   10/26/22 96.5 kg (212 lb 11.2 oz)   09/22/22 96 kg (211 lb 11.2 oz)   08/31/22 97.1 kg (214 lb 1.6 oz)   07/14/22 99.8 kg (220 lb 1.6 oz)   06/17/22 101 kg (221 lb 9.6 oz)   06/02/22 102 kg (225 lb 3.2 oz)   05/20/22 102 kg (224 lb 12.8 oz)   04/27/22 105 kg (231 lb 11.2 oz)   04/06/22 104 kg (228 lb 15.2 oz)   03/31/22 108 kg (237 lb 3.2 oz)   03/17/22 107 kg (235 lb)   03/16/22 106 kg (233 lb 6.4 oz)   03/10/22 107 kg (235 lb)       Physical Exam  Constitutional:       General: No acute distress.  Well-nourished  HENT:      Head: Normocephalic and atraumatic.   Eyes:      Extraocular Movements: Extraocular movements intact.      Conjunctiva/pupils: Conjunctivae normal. Pupils are equal, round  Pulmonary:      Effort: Pulmonary effort is normal. No labored breathing   Neurological:      General: No focal deficit present.  AO x 3     Mental Status: Alert and oriented to person, place, and time. Mental status is at baseline.   Psychiatric:         Mood and Affect: Mood normal.         Behavior: Behavior normal.     Labs and Imaging  Recent labs and imaging have been personally reviewed.

## 2025-05-30 ENCOUNTER — ANESTHESIA (OUTPATIENT)
Dept: ANESTHESIOLOGY | Facility: HOSPITAL | Age: 50
End: 2025-05-30

## 2025-05-30 ENCOUNTER — ANESTHESIA EVENT (OUTPATIENT)
Dept: ANESTHESIOLOGY | Facility: HOSPITAL | Age: 50
End: 2025-05-30

## 2025-05-30 ENCOUNTER — TELEPHONE (OUTPATIENT)
Dept: BARIATRICS | Facility: CLINIC | Age: 50
End: 2025-05-30

## 2025-05-30 NOTE — TELEPHONE ENCOUNTER
PA for mounjaro 2.5 SUBMITTED to [employer]     via    []CMM-KEY: [id number]  []Surescripts-Case ID #   []Availity-Auth ID # NDC #   []Faxed to plan   []Other website   []Phone call Case ID #     []PA sent as URGENT    All office notes, labs and other pertaining documents and studies sent. Clinical questions answered. Awaiting determination from insurance company.     Turnaround time for your insurance to make a decision on your Prior Authorization can take 7-21 business days.

## 2025-06-02 ENCOUNTER — OFFICE VISIT (OUTPATIENT)
Dept: NEPHROLOGY | Facility: CLINIC | Age: 50
End: 2025-06-02
Payer: COMMERCIAL

## 2025-06-02 VITALS
WEIGHT: 213 LBS | SYSTOLIC BLOOD PRESSURE: 122 MMHG | HEIGHT: 62 IN | DIASTOLIC BLOOD PRESSURE: 68 MMHG | BODY MASS INDEX: 39.2 KG/M2

## 2025-06-02 DIAGNOSIS — E66.9 TYPE 2 DIABETES MELLITUS WITH OBESITY  (HCC): ICD-10-CM

## 2025-06-02 DIAGNOSIS — N20.0 NEPHROLITHIASIS: Primary | ICD-10-CM

## 2025-06-02 DIAGNOSIS — E83.42 HYPOMAGNESEMIA: ICD-10-CM

## 2025-06-02 DIAGNOSIS — E87.1 HYPONATREMIA: ICD-10-CM

## 2025-06-02 DIAGNOSIS — E11.69 TYPE 2 DIABETES MELLITUS WITH OBESITY  (HCC): ICD-10-CM

## 2025-06-02 DIAGNOSIS — N13.2 HYDRONEPHROSIS WITH RENAL AND URETERAL CALCULUS OBSTRUCTION: ICD-10-CM

## 2025-06-02 DIAGNOSIS — E11.9 TYPE 2 DIABETES MELLITUS WITHOUT COMPLICATION, WITHOUT LONG-TERM CURRENT USE OF INSULIN (HCC): ICD-10-CM

## 2025-06-02 PROCEDURE — 99214 OFFICE O/P EST MOD 30 MIN: CPT | Performed by: INTERNAL MEDICINE

## 2025-06-02 NOTE — ASSESSMENT & PLAN NOTE
Increase magnesium supplement to twice daily.  She believes she is on magnesium glycinate.  If her magnesium levels remain low and her A1c is rising would add an SGLT2i as this can help increase magnesium levels modestly.

## 2025-06-02 NOTE — ASSESSMENT & PLAN NOTE
Her A1c is close to 6  She is now on Mounjaro as her GLP-1 agonist  If an additional agent is needed could consider an SGLT2i    Lab Results   Component Value Date    HGBA1C 6.2 (H) 08/11/2024       Orders:    Comprehensive metabolic panel    Magnesium    Phosphorus    PTH, intact    Protein / creatinine ratio, urine    CBC

## 2025-06-02 NOTE — ASSESSMENT & PLAN NOTE
She last passed a kidney stone in 2018  Her renal ultrasound from 2023 shows a left 6 mm kidney stone.  She has been pain-free and stable her stone risk profile is pending  She is on hydrochlorothiazide 25 mg daily  Potassium citrate 10 mill equivalents daily instead of twice daily  Has been stone free for the last 6 years  Continue current efforts with increasing urinary volume and current medications  We will repeat a renal ultrasound now    Orders:    US kidney and bladder; Future    Comprehensive metabolic panel    Magnesium    Phosphorus    PTH, intact    Protein / creatinine ratio, urine    CBC

## 2025-06-02 NOTE — PROGRESS NOTES
Name: Sherice Rubio      : 1975      MRN: [id number]  Encounter Provider: Lucien Melendez DO  Encounter Date: 2025   Encounter department: [facility]  :  Assessment & Plan  Hyponatremia  She remains on hydrochlorothiazide, her sodium levels have been stable around 136-140.  Will take this off her problem list.  Given stability.    Orders:    Comprehensive metabolic panel    Magnesium    Phosphorus    PTH, intact    Protein / creatinine ratio, urine    CBC    Type 2 diabetes mellitus without complication, without long-term current use of insulin (HCC)  Her A1c is close to 6  She is now on Mounjaro as her GLP-1 agonist  If an additional agent is needed could consider an SGLT2i    Lab Results   Component Value Date    HGBA1C 6.2 (H) 2024       Orders:    Comprehensive metabolic panel    Magnesium    Phosphorus    PTH, intact    Protein / creatinine ratio, urine    CBC    Nephrolithiasis  She last passed a kidney stone in   Her renal ultrasound from  shows a left 6 mm kidney stone.  She has been pain-free and stable her stone risk profile is pending  She is on hydrochlorothiazide 25 mg daily  Potassium citrate 10 mill equivalents daily instead of twice daily  Has been stone free for the last 6 years  Continue current efforts with increasing urinary volume and current medications  We will repeat a renal ultrasound now    Orders:    US kidney and bladder; Future    Comprehensive metabolic panel    Magnesium    Phosphorus    PTH, intact    Protein / creatinine ratio, urine    CBC    Type 2 diabetes mellitus with obesity  (HCC)  She is following up with weight management  Her GLP-1's been changed.  Continue to monitor A1c  Lab Results   Component Value Date    HGBA1C 6.2 (H) 2024       Orders:    Comprehensive metabolic panel    Magnesium    Phosphorus    PTH, intact    Protein / creatinine ratio, urine    CBC    Hydronephrosis with renal and ureteral  calculus obstruction  No recent hydronephrosis and is currently stable    Orders:    Comprehensive metabolic panel    Magnesium    Phosphorus    PTH, intact    Protein / creatinine ratio, urine    CBC    Hypomagnesemia  Increase magnesium supplement to twice daily.  She believes she is on magnesium glycinate.  If her magnesium levels remain low and her A1c is rising would add an SGLT2i as this can help increase magnesium levels modestly.           Patient Instructions   Katelyn,    You are here today for follow-up regarding your kidney function, kidney stones, electrolytes.  Overall your kidney function is excellent.  Your electrolytes are all stable.  Your magnesium level was slightly low.  Your blood pressure is very well-controlled at we recommend the following.    Please increase your magnesium supplement to twice daily.  Let me know which supplement you are taking.  You may be taking magnesium glycinate if it is over-the-counter.  Please continue hydrochlorothiazide 25 mg daily.  Do your best with the potassium citrate on taking it twice a day.  Please obtain a repeat renal ultrasound to ensure stability of your kidney stone  If an alternative diabetic medication is needed can consider an SGLT2i such as Farxiga or Jardiance as this can also help maintain your magnesium levels  If everything is stable we can follow-up in 1 month.  1 year stone risk profile results I will call you to let you know.      Sincerely,    [facility]      History of Present Illness   HPI  Sherice Rubio is a 50 y.o. female who presents for follow-up.  Overall she has been doing well.  She denies any acute chest pain or shortness of breath no fevers chills nausea vomiting diarrhea constipation foamy or bloody urine.  Tolerating her medications without difficulty.  Good urine output.  Blood pressures have been stable.  Her weights have plateaued with the GLP-1.  Overall she is stable.  History obtained from:  "patient    Review of Systems   Constitutional:  Negative for chills and fever.   HENT:  Negative for ear pain and sore throat.    Eyes:  Negative for pain and visual disturbance.   Respiratory:  Negative for cough and shortness of breath.    Cardiovascular:  Negative for chest pain and palpitations.   Gastrointestinal:  Negative for abdominal pain and vomiting.   Genitourinary:  Negative for dysuria and hematuria.   Musculoskeletal:  Negative for arthralgias and back pain.   Skin:  Negative for color change and rash.   Neurological:  Negative for seizures and syncope.   All other systems reviewed and are negative.    Medical History Reviewed by provider this encounter:     .  Past Medical History   Past Medical History[1]  Past Surgical History[2]  Family History[3]   reports that she has never smoked. She has never used smokeless tobacco. She reports current alcohol use. She reports that she does not use drugs.  Current Outpatient Medications   Medication Instructions    Calcium Carb-Cholecalciferol (CALCIUM/VITAMIN D PO) 1 tablet, Every other day    famotidine (PEPCID) 20 mg, Daily    hydroCHLOROthiazide 25 mg, Oral, Daily    magnesium oxide (MAG-OX) 400 mg, Oral, Daily    Mounjaro 2.5 mg, Subcutaneous, Weekly    potassium citrate (UROCIT-K) 10 mEq 10 mEq, Oral, 2 times daily   Allergies[4]   Medications Ordered Prior to Encounter[5]   Social History[6]     Objective   /68 (BP Location: Left arm, Patient Position: Sitting, Cuff Size: Large)   Ht 5' 2\" (1.575 m)   Wt 96.6 kg (213 lb)   BMI 38.96 kg/m²      Physical Exam  Vitals and nursing note reviewed.   Constitutional:       General: She is not in acute distress.     Appearance: She is well-developed.   HENT:      Head: Normocephalic and atraumatic.     Eyes:      Conjunctiva/sclera: Conjunctivae normal.       Cardiovascular:      Rate and Rhythm: Normal rate and regular rhythm.      Heart sounds: No murmur heard.  Pulmonary:      Effort: Pulmonary " effort is normal. No respiratory distress.      Breath sounds: Normal breath sounds.   Abdominal:      Palpations: Abdomen is soft.      Tenderness: There is no abdominal tenderness.     Musculoskeletal:         General: No swelling.      Cervical back: Neck supple.     Skin:     General: Skin is warm and dry.      Capillary Refill: Capillary refill takes less than 2 seconds.     Neurological:      Mental Status: She is alert.     Psychiatric:         Mood and Affect: Mood normal.                [1]   Past Medical History:  Diagnosis Date    Chronic kidney disease     Diabetes mellitus (HCC)     DJD (degenerative joint disease)     Eczema     GERD (gastroesophageal reflux disease)     Gestational diabetes     Hydronephrosis     last assessed 17 documented resolved 17    Kidney stone     Kidney stones 2020    Menorrhagia with regular cycle 2018    Miscarriage     Nephrolithiasis     Obesity     Seasonal allergies     Varicella    [2]   Past Surgical History:  Procedure Laterality Date     SECTION      X 2  and      CYSTOSCOPY      DILATION AND CURETTAGE OF UTERUS      x 2 for missed     VT CYSTO/URETERO W/LITHOTRIPSY &INDWELL STENT INSRT Right 2016    Procedure: CYSTOSCOPY; URETEROSCOPY; HOLMIUM LASER LITHOTRIPSY; BASKET STONE EXTRACTION; RETROGRADE PYELOGRAM; STENT PLACEMENT ;  Surgeon: Sebastien Krishnamurthy MD;  Location: [facility];  Service: Urology    VT CYSTO/URETERO W/LITHOTRIPSY &INDWELL STENT INSRT Left 2018    Procedure: CYSTOSCOPY, LEFT URETEROSCOPY AND RENAL ENDOSCOPY WITH LITHOTRIPSY OF RENAL CALCULUS WITH HOLMIUM LASER,  BILATERAL RETROGRADE PYELOGRAM AND INSERTION OF LEFT URETERAL STENT;  Surgeon: Luisito Chaudhari MD;  Location: [facility];  Service: Urology    TONSILLECTOMY  2012    TUBAL LIGATION  2014    at time of     WISDOM TOOTH EXTRACTION     [3]   Family History  Problem Relation Name Age of Onset    Diabetes Mother Elena     Heart attack  Father Memo     Heart disease Father Memo         myocardial infarction    Hyperlipidemia Father Memo     Hypertension Father Memo     Coronary artery disease Father Memo     Cancer Father Memo 55        malignant neoplasm of urinary bladder    Multiple sclerosis Sister      No Known Problems Sister      COPD Maternal Grandmother      No Known Problems Maternal Grandfather      Liver cancer Paternal Grandmother Taylor 70    Pancreatic cancer Paternal Grandmother Taylor 70    Diabetes Paternal Grandmother Taylor     Cancer Paternal Grandmother Taylor     Stomach cancer Paternal Grandfather          unknown age    No Known Problems Son      No Known Problems Son      Pancreatic cancer Maternal Aunt  73    No Known Problems Maternal Aunt      Endometrial cancer Paternal Aunt half aunt 64    Breast cancer Neg Hx      Ovarian cancer Neg Hx      Colon cancer Neg Hx     [4] No Known Allergies  [5]   Current Outpatient Medications on File Prior to Visit   Medication Sig Dispense Refill    Calcium Carb-Cholecalciferol (CALCIUM/VITAMIN D PO) Take 1 tablet by mouth every other day      famotidine (PEPCID) 20 mg tablet Take 20 mg by mouth in the morning.      hydroCHLOROthiazide 25 mg tablet Take 1 tablet (25 mg total) by mouth daily 90 tablet 1    magnesium oxide (MAG-OX) 400 mg Take 1 tablet (400 mg total) by mouth daily 90 tablet 3    potassium citrate (UROCIT-K) 10 mEq TAKE ONE TABLET BY MOUTH 2 TIMES A  tablet 0    Tirzepatide (Mounjaro) 2.5 MG/0.5ML SOAJ Inject 2.5 mg under the skin once a week 2 mL 0     No current facility-administered medications on file prior to visit.   [6]   Social History  Tobacco Use    Smoking status: Never    Smokeless tobacco: Never   Vaping Use    Vaping status: Never Used   Substance and Sexual Activity    Alcohol use: Yes     Comment: rare    Drug use: Never    Sexual activity: Yes     Partners: Male     Birth control/protection: Female Sterilization     Comment:  lifetime partners: 6; current partner: 2005

## 2025-06-02 NOTE — PATIENT INSTRUCTIONS
Katelyn,    You are here today for follow-up regarding your kidney function, kidney stones, electrolytes.  Overall your kidney function is excellent.  Your electrolytes are all stable.  Your magnesium level was slightly low.  Your blood pressure is very well-controlled at we recommend the following.    Please increase your magnesium supplement to twice daily.  Let me know which supplement you are taking.  You may be taking magnesium glycinate if it is over-the-counter.  Please continue hydrochlorothiazide 25 mg daily.  Do your best with the potassium citrate on taking it twice a day.  Please obtain a repeat renal ultrasound to ensure stability of your kidney stone  If an alternative diabetic medication is needed can consider an SGLT2i such as Farxiga or Jardiance as this can also help maintain your magnesium levels  If everything is stable we can follow-up in 1 month.  1 year stone risk profile results I will call you to let you know.      Sincerely,    Yoan Ortiz [facility]

## 2025-06-02 NOTE — ASSESSMENT & PLAN NOTE
No recent hydronephrosis and is currently stable    Orders:    Comprehensive metabolic panel    Magnesium    Phosphorus    PTH, intact    Protein / creatinine ratio, urine    CBC

## 2025-06-02 NOTE — ASSESSMENT & PLAN NOTE
She is following up with weight management  Her GLP-1's been changed.  Continue to monitor A1c  Lab Results   Component Value Date    HGBA1C 6.2 (H) 08/11/2024       Orders:    Comprehensive metabolic panel    Magnesium    Phosphorus    PTH, intact    Protein / creatinine ratio, urine    CBC

## 2025-06-02 NOTE — ASSESSMENT & PLAN NOTE
She remains on hydrochlorothiazide, her sodium levels have been stable around 136-140.  Will take this off her problem list.  Given stability.    Orders:    Comprehensive metabolic panel    Magnesium    Phosphorus    PTH, intact    Protein / creatinine ratio, urine    CBC

## 2025-06-03 NOTE — TELEPHONE ENCOUNTER
PA for mounjaro 2.5mg APPROVED     Date(s) approved 05/30/2025-05/30/2026    Case #    Patient advised by          [x]MyChart Message  []Phone call   []LMOM  []L/M to call office as no active Communication consent on file  []Unable to leave detailed message as VM not approved on Communication consent       Pharmacy advised by    [x]Fax  []Phone call  []Secure Chat    Specialty Pharmacy    []     Approval letter scanned into Media Yes

## 2025-06-04 ENCOUNTER — HOSPITAL ENCOUNTER (OUTPATIENT)
Dept: RADIOLOGY | Age: 50
Discharge: HOME | End: 2025-06-04
Payer: COMMERCIAL

## 2025-06-04 DIAGNOSIS — N20.0 NEPHROLITHIASIS: ICD-10-CM

## 2025-06-04 PROCEDURE — 76775 US EXAM ABDO BACK WALL LIM: CPT

## 2025-06-05 ENCOUNTER — RESULTS FOLLOW-UP (OUTPATIENT)
Dept: NEPHROLOGY | Facility: CLINIC | Age: 50
End: 2025-06-05

## 2025-06-05 LAB
AMMONIA UR-SCNC: 38 MMOL/24 HR (ref 15–60)
CA H2 PHOS DIHYD 24H SATFR UR: 1.06 (ref 0.5–2)
CALCIUM 24H UR-MRATE: 370 MG/24 HR
CALCIUM/CREAT UR: 226 MG/G CREAT (ref 51–262)
CALCIUM/KG BODY WEIGHT: 4 MG/24 HR/KG
CAOX INDEX 24H UR-RTO: 5.02 (ref 6–10)
CHLORIDE 24H UR-SRATE: 282 MMOL/24 HR (ref 70–250)
CITRATE 24H UR-MCNC: 978 MG/24 HR
COMMENT: ABNORMAL
CREAT UR-MCNC: 1641 MG/24 HR
CREAT/BW 24H UR-RELMRAT: 17.6 MG/24 HR/KG (ref 8.7–20.3)
CYSTINE 24H UR-MCNC: ABNORMAL MG/DL
MAGNESIUM 24H UR-MRATE: 121 MG/24 HR (ref 30–120)
OXALATE UR-MCNC: 27 MG/24 HR (ref 20–40)
PH 24H UR: 6.27 [PH] (ref 5.8–6.2)
PHOSPHATE 24H UR-MRATE: 607 MG/24 HR (ref 600–1200)
POTASSIUM 24H UR-SCNC: 70 MMOL/24 HR (ref 20–100)
PROTEIN CATABOLIC RATE 24H UR-MRATE: 1.1 G/KG/24 HR (ref 0.8–1.4)
SODIUM 24H UR-SRATE: 251 MMOL/24 HR (ref 50–150)
SPECIMEN VOL 24H UR: 2420 ML/24 HR (ref 500–4000)
SULFATE 24H UR-SRATE: 39 MEQ/24 HR (ref 20–80)
URATE 24H SATFR UR: 0.42
URATE 24H UR-MRATE: 873 MG/24 HR
UUN 24H UR-MRATE: 13.83 G/24 HR (ref 6–14)

## 2025-06-05 NOTE — TELEPHONE ENCOUNTER
Spoke with pt regarding the following message     Lucien Melendez, DO   Please let her know she has good urine volume.  Urine calcium was slightly higher but Calcium Oxalate saturation was lower.    Continue attempts with low sodium diet  Continue oral hydration  Continue current medications given stability     Pt verbalized understanding.

## 2025-06-09 RX ORDER — SODIUM CHLORIDE, SODIUM LACTATE, POTASSIUM CHLORIDE, CALCIUM CHLORIDE 600; 310; 30; 20 MG/100ML; MG/100ML; MG/100ML; MG/100ML
125 INJECTION, SOLUTION INTRAVENOUS CONTINUOUS
Status: CANCELLED | OUTPATIENT
Start: 2025-06-09

## 2025-06-10 ENCOUNTER — ANESTHESIA (OUTPATIENT)
Dept: GASTROENTEROLOGY | Facility: AMBULARY SURGERY CENTER | Age: 50
End: 2025-06-10
Payer: COMMERCIAL

## 2025-06-10 ENCOUNTER — HOSPITAL ENCOUNTER (OUTPATIENT)
Dept: GASTROENTEROLOGY | Facility: AMBULARY SURGERY CENTER | Age: 50
Setting detail: OUTPATIENT SURGERY
Discharge: HOME | End: 2025-06-10
Attending: INTERNAL MEDICINE
Payer: COMMERCIAL

## 2025-06-10 VITALS
HEART RATE: 85 BPM | SYSTOLIC BLOOD PRESSURE: 106 MMHG | OXYGEN SATURATION: 95 % | RESPIRATION RATE: 21 BRPM | BODY MASS INDEX: 38.64 KG/M2 | HEIGHT: 62 IN | DIASTOLIC BLOOD PRESSURE: 64 MMHG | WEIGHT: 210 LBS | TEMPERATURE: 98.5 F

## 2025-06-10 DIAGNOSIS — Z12.11 SCREENING FOR COLON CANCER: ICD-10-CM

## 2025-06-10 LAB — GLUCOSE SERPL-MCNC: 104 MG/DL (ref 65–140)

## 2025-06-10 PROCEDURE — 82948 REAGENT STRIP/BLOOD GLUCOSE: CPT

## 2025-06-10 PROCEDURE — 45385 COLONOSCOPY W/LESION REMOVAL: CPT | Performed by: INTERNAL MEDICINE

## 2025-06-10 PROCEDURE — 88305 TISSUE EXAM BY PATHOLOGIST: CPT | Performed by: PATHOLOGY

## 2025-06-10 RX ORDER — SODIUM CHLORIDE, SODIUM LACTATE, POTASSIUM CHLORIDE, CALCIUM CHLORIDE 600; 310; 30; 20 MG/100ML; MG/100ML; MG/100ML; MG/100ML
125 INJECTION, SOLUTION INTRAVENOUS CONTINUOUS
Status: DISCONTINUED | OUTPATIENT
Start: 2025-06-10 | End: 2025-06-14 | Stop reason: HOSPADM

## 2025-06-10 RX ORDER — ONDANSETRON 2 MG/ML
INJECTION INTRAMUSCULAR; INTRAVENOUS AS NEEDED
Status: DISCONTINUED | OUTPATIENT
Start: 2025-06-10 | End: 2025-06-10

## 2025-06-10 RX ORDER — LIDOCAINE HYDROCHLORIDE 10 MG/ML
INJECTION, SOLUTION EPIDURAL; INFILTRATION; INTRACAUDAL; PERINEURAL AS NEEDED
Status: DISCONTINUED | OUTPATIENT
Start: 2025-06-10 | End: 2025-06-10

## 2025-06-10 RX ORDER — PROPOFOL 10 MG/ML
INJECTION, EMULSION INTRAVENOUS AS NEEDED
Status: DISCONTINUED | OUTPATIENT
Start: 2025-06-10 | End: 2025-06-10

## 2025-06-10 RX ORDER — SODIUM CHLORIDE, SODIUM LACTATE, POTASSIUM CHLORIDE, CALCIUM CHLORIDE 600; 310; 30; 20 MG/100ML; MG/100ML; MG/100ML; MG/100ML
INJECTION, SOLUTION INTRAVENOUS CONTINUOUS PRN
Status: DISCONTINUED | OUTPATIENT
Start: 2025-06-10 | End: 2025-06-10

## 2025-06-10 RX ORDER — ONDANSETRON 2 MG/ML
4 INJECTION INTRAMUSCULAR; INTRAVENOUS ONCE AS NEEDED
Status: CANCELLED | OUTPATIENT
Start: 2025-06-10

## 2025-06-10 RX ADMIN — LIDOCAINE HYDROCHLORIDE 50 MG: 10 INJECTION, SOLUTION EPIDURAL; INFILTRATION; INTRACAUDAL at 07:34

## 2025-06-10 RX ADMIN — PROPOFOL 30 MG: 10 INJECTION, EMULSION INTRAVENOUS at 07:36

## 2025-06-10 RX ADMIN — PROPOFOL 100 MG: 10 INJECTION, EMULSION INTRAVENOUS at 07:34

## 2025-06-10 RX ADMIN — SODIUM CHLORIDE, SODIUM LACTATE, POTASSIUM CHLORIDE, AND CALCIUM CHLORIDE: .6; .31; .03; .02 INJECTION, SOLUTION INTRAVENOUS at 07:34

## 2025-06-10 RX ADMIN — PROPOFOL 30 MG: 10 INJECTION, EMULSION INTRAVENOUS at 07:43

## 2025-06-10 RX ADMIN — PROPOFOL 30 MG: 10 INJECTION, EMULSION INTRAVENOUS at 07:38

## 2025-06-10 RX ADMIN — ONDANSETRON 4 MG: 2 INJECTION INTRAMUSCULAR; INTRAVENOUS at 07:41

## 2025-06-10 RX ADMIN — PROPOFOL 30 MG: 10 INJECTION, EMULSION INTRAVENOUS at 07:44

## 2025-06-10 RX ADMIN — PROPOFOL 20 MG: 10 INJECTION, EMULSION INTRAVENOUS at 07:46

## 2025-06-10 RX ADMIN — PROPOFOL 40 MG: 10 INJECTION, EMULSION INTRAVENOUS at 07:39

## 2025-06-10 NOTE — ANESTHESIA PREPROCEDURE EVALUATION
Procedure:  COLONOSCOPY    Relevant Problems   ANESTHESIA (within normal limits)      CARDIO (within normal limits)      ENDO   (+) Type 2 diabetes mellitus with obesity  (HCC)   (+) Type 2 diabetes mellitus without complication, without long-term current use of insulin (HCC)      GI/HEPATIC   (+) GERD (gastroesophageal reflux disease)      /RENAL   (+) Hydronephrosis of left kidney   (+) Hydronephrosis with renal and ureteral calculus obstruction   (+) Nephrolithiasis      PULMONARY (within normal limits)        Physical Exam    Airway     Mallampati score: III  TM Distance: >3 FB  Neck ROM: full  Mouth opening: >= 4 cm      Cardiovascular  Cardiovascular exam normal    Dental   No notable dental hx     Pulmonary  Pulmonary exam normal     Neurological    She appears awake, alert and oriented x3.      Other Findings  post-pubertal.      Anesthesia Plan  ASA Score- 2     Anesthesia Type- IV sedation with anesthesia with ASA Monitors.         Additional Monitors:     Airway Plan:            Plan Factors-Exercise tolerance (METS): >4 METS.    Chart reviewed. EKG reviewed.  Existing labs reviewed. Patient summary reviewed.    Patient is not a current smoker.              Induction-     Postoperative Plan- .   Monitoring Plan - Monitoring plan - standard ASA monitoring  Post Operative Pain Plan - non-opiod analgesics        Informed Consent- Anesthetic plan and risks discussed with patient.  I personally reviewed this patient with the CRNA. Discussed and agreed on the Anesthesia Plan with the CRNA..      NPO Status:  Vitals Value Taken Time   Date of last liquid 06/10/25 06/10/25 06:54   Time of last liquid 0145 06/10/25 06:54   Date of last solid 06/08/25 06/10/25 06:54   Time of last solid 2100 06/10/25 06:54

## 2025-06-10 NOTE — H&P
"History and Physical -  [facility]  Sherice Rubio 50 y.o. female MRN: [id number]    HPI: Sherice Rubio is a 50 y.o. year old female who presents for screening colonoscopy      Review of Systems    Historical Information   Past Medical History[1]  Past Surgical History[2]  Social History   Social History     Substance and Sexual Activity   Alcohol Use Yes    Comment: rare     Social History     Substance and Sexual Activity   Drug Use Never     Tobacco Use History[3]  Family History[4]    Meds/Allergies     Not in a hospital admission.    Allergies[5]    Objective     /65   Pulse 89   Temp (!) 97.2 °F (36.2 °C) (Temporal)   Resp 16   Ht 5' 2\" (1.575 m)   Wt 95.3 kg (210 lb)   SpO2 99%   BMI 38.41 kg/m²       PHYSICAL EXAM    Gen: NAD  CV: RRR  CHEST: Clear  ABD: soft, NT/ND  EXT: no edema  Neuro: AAO      ASSESSMENT/PLAN:  This is a 50 y.o. year old female here for screening colonoscopy    PLAN:   Procedure: colonoscopy           [1]   Past Medical History:  Diagnosis Date    Chronic kidney disease     Diabetes mellitus (HCC)     DJD (degenerative joint disease)     Eczema     GERD (gastroesophageal reflux disease)     Gestational diabetes     Hydronephrosis     last assessed 17 documented resolved 17    Kidney stone     Kidney stones 2020    Menorrhagia with regular cycle 2018    Miscarriage     Nephrolithiasis     Obesity     Seasonal allergies     Varicella    [2]   Past Surgical History:  Procedure Laterality Date     SECTION      X 2  and      CYSTOSCOPY      DILATION AND CURETTAGE OF UTERUS      x 2 for missed     GA CYSTO/URETERO W/LITHOTRIPSY &INDWELL STENT INSRT Right 2016    Procedure: CYSTOSCOPY; URETEROSCOPY; HOLMIUM LASER LITHOTRIPSY; BASKET STONE EXTRACTION; RETROGRADE PYELOGRAM; STENT PLACEMENT ;  Surgeon: Sebastien Krishnamurthy MD;  Location:  MAIN OR;  Service: Urology    GA CYSTO/URETERO W/LITHOTRIPSY &INDWELL STENT " INSRT Left 2018    Procedure: CYSTOSCOPY, LEFT URETEROSCOPY AND RENAL ENDOSCOPY WITH LITHOTRIPSY OF RENAL CALCULUS WITH HOLMIUM LASER,  BILATERAL RETROGRADE PYELOGRAM AND INSERTION OF LEFT URETERAL STENT;  Surgeon: Luisito Chaudhari MD;  Location: [facility];  Service: Urology    TONSILLECTOMY      TUBAL LIGATION      at time of     WISDOM TOOTH EXTRACTION     [3]   Social History  Tobacco Use   Smoking Status Never   Smokeless Tobacco Never   [4]   Family History  Problem Relation Name Age of Onset    Diabetes Mother Elena     Heart attack Father Memo     Heart disease Father Memo         myocardial infarction    Hyperlipidemia Father Memo     Hypertension Father Memo     Coronary artery disease Father Memo     Cancer Father Memo 55        malignant neoplasm of urinary bladder    Multiple sclerosis Sister      No Known Problems Sister      COPD Maternal Grandmother      No Known Problems Maternal Grandfather      Liver cancer Paternal Grandmother Taylor 70    Pancreatic cancer Paternal Grandmother Taylor 70    Diabetes Paternal Grandmother Taylor     Cancer Paternal Grandmother Taylor     Stomach cancer Paternal Grandfather          unknown age    No Known Problems Son      No Known Problems Son      Pancreatic cancer Maternal Aunt  73    No Known Problems Maternal Aunt      Endometrial cancer Paternal Aunt half aunt 64    Breast cancer Neg Hx      Ovarian cancer Neg Hx      Colon cancer Neg Hx     [5] No Known Allergies

## 2025-06-10 NOTE — ANESTHESIA POSTPROCEDURE EVALUATION
Post-Op Assessment Note    CV Status:  Stable  Pain Score: 0    Pain management: adequate       Mental Status:  Sleepy   Hydration Status:  Stable   PONV Controlled:  None   Airway Patency:  Patent     Post Op Vitals Reviewed: Yes    No anethesia notable event occurred.    Staff: CRNA           Last Filed PACU Vitals:  Vitals Value Taken Time   Temp 98.5F    Pulse 91    /58    Resp 16    SpO2 97% 6L FM

## 2025-06-12 PROCEDURE — 88305 TISSUE EXAM BY PATHOLOGIST: CPT | Performed by: PATHOLOGY

## 2025-06-13 ENCOUNTER — RESULTS FOLLOW-UP (OUTPATIENT)
Dept: NEPHROLOGY | Facility: CLINIC | Age: 50
End: 2025-06-13

## 2025-06-13 DIAGNOSIS — N13.30 HYDRONEPHROSIS: Primary | ICD-10-CM

## 2025-06-16 ENCOUNTER — RESULTS FOLLOW-UP (OUTPATIENT)
Dept: GASTROENTEROLOGY | Facility: CLINIC | Age: 50
End: 2025-06-16

## 2025-06-28 DIAGNOSIS — E87.1 HYPONATREMIA: ICD-10-CM

## 2025-06-28 DIAGNOSIS — N13.30 HYDRONEPHROSIS OF LEFT KIDNEY: ICD-10-CM

## 2025-06-28 DIAGNOSIS — N20.0 NEPHROLITHIASIS: ICD-10-CM

## 2025-06-28 DIAGNOSIS — N13.2 HYDRONEPHROSIS WITH RENAL AND URETERAL CALCULUS OBSTRUCTION: ICD-10-CM

## 2025-06-30 DIAGNOSIS — E11.9 TYPE 2 DIABETES MELLITUS WITHOUT COMPLICATION, WITHOUT LONG-TERM CURRENT USE OF INSULIN (HCC): Primary | ICD-10-CM

## 2025-06-30 RX ORDER — TIRZEPATIDE 5 MG/.5ML
0.5 INJECTION, SOLUTION SUBCUTANEOUS WEEKLY
Qty: 2 ML | Refills: 1 | Status: SHIPPED | OUTPATIENT
Start: 2025-06-30

## 2025-06-30 RX ORDER — HYDROCHLOROTHIAZIDE 25 MG/1
25 TABLET ORAL DAILY
Qty: 90 TABLET | Refills: 1 | Status: SHIPPED | OUTPATIENT
Start: 2025-06-30

## 2025-06-30 RX ORDER — POTASSIUM CITRATE 1080 MG/1
10 TABLET, EXTENDED RELEASE ORAL 2 TIMES DAILY
Qty: 180 TABLET | Refills: 0 | Status: SHIPPED | OUTPATIENT
Start: 2025-06-30

## 2025-07-17 ENCOUNTER — TELEPHONE (OUTPATIENT)
Dept: FAMILY MEDICINE CLINIC | Facility: CLINIC | Age: 50
End: 2025-07-17

## 2025-07-17 NOTE — TELEPHONE ENCOUNTER
Patient has not been seen since 2022 and is establishing with new provider. Please remove Joanna from banpriyanka.

## 2025-07-17 NOTE — TELEPHONE ENCOUNTER
07/17/25 10:24 AM     The office's request has been received and reviewed.     Current PCP will remain until the patient establishes and arrives at new / [facility] PCP office.      This message will now be completed.    Any additional questions or concerns should be sent to the Practice Liaisons via the appropriate education email address. Please do not reply via In Basket or Encounter.    Thank you  Kathy Dick

## (undated) DEVICE — CATH URETERAL 5FR X 70 CM FLEX TIP POLYUR BARD

## (undated) DEVICE — SHEATH URETERAL ACCESS 12/14FR 35CM PROXIS

## (undated) DEVICE — CYSTO TUBING TUR Y IRRIGATION

## (undated) DEVICE — SPECIMEN CONTAINER STERILE PEEL PACK

## (undated) DEVICE — CYSTOSCOPY PACK: Brand: CONVERTORS

## (undated) DEVICE — GLOVE SRG BIOGEL ECLIPSE 8

## (undated) DEVICE — GUIDEWIRE STRGHT TIP 0.035 IN  SOLO PLUS

## (undated) DEVICE — LASER FIBER HOLMIUM 272MICRON